# Patient Record
Sex: FEMALE | Race: ASIAN | NOT HISPANIC OR LATINO | URBAN - METROPOLITAN AREA
[De-identification: names, ages, dates, MRNs, and addresses within clinical notes are randomized per-mention and may not be internally consistent; named-entity substitution may affect disease eponyms.]

---

## 2018-08-24 VITALS — TEMPERATURE: 98 F | HEART RATE: 60 BPM | HEIGHT: 57 IN | WEIGHT: 119.05 LBS

## 2018-08-24 RX ORDER — CHLORHEXIDINE GLUCONATE 213 G/1000ML
1 SOLUTION TOPICAL ONCE
Qty: 0 | Refills: 0 | Status: DISCONTINUED | OUTPATIENT
Start: 2018-10-02 | End: 2018-10-17

## 2018-08-24 NOTE — H&P ADULT - PMH
Anemia    GERD (gastroesophageal reflux disease)    HLD (hyperlipidemia)    HTN (hypertension)    MI (myocardial infarction)

## 2018-08-24 NOTE — H&P ADULT - HISTORY OF PRESENT ILLNESS
***SKELETON***    54 y/o F with HTN, HLD, known CAD with h/o MI in 2011 - single vessel dz (D1 50%), known severe AR, who presented to his cardiologist with worsening SIDDIQUI, on walking......  Denies any chest pain, dizziness, n/v, diaphoresis, orthopnea, PND, LE edema, palpitations.  Subsequently, she underwent an ECHO on 6/30/18 which revealed EF 74%, mild to moderate AS, severe AR, mild MR.    In light of pt's risk factors, CCS class  ____ anginal equivalent symptoms and abnormal ECHO, he is recommended for R and L cardiac cath with possible intervention for suspected CAD and evaluation of valve area. 54 y/o F with HTN, HLD, known CAD with h/o MI in 2011 - single vessel dz (D1 50%), known severe AR, who presented to his cardiologist with worsening SIDDIQUI, on walking......  Denies any chest pain, dizziness, n/v, diaphoresis, orthopnea, PND, LE edema, palpitations.  Subsequently, she underwent an ECHO on 6/30/18 which revealed EF 74%, mild to moderate AS, severe AR, mild MR.    In light of pt's risk factors, CCS class  ____ anginal equivalent symptoms and abnormal ECHO, he is recommended for R and L cardiac cath with possible intervention for suspected CAD and evaluation of valve area. 54 y/o F with HTN, HLD, known CAD with h/o MI in 2011 - single vessel dz (D1 50%), known severe AR, who presented to his cardiologist with worsening SIDDIQUI, on walking less than 3 blocks.  Denies any chest pain, dizziness, n/v, diaphoresis, orthopnea, PND, LE edema, palpitations.  Subsequently, she underwent an ECHO on 6/30/18 which revealed EF 74%, mild to moderate AS, severe AR, mild MR.    In light of pt's risk factors, CCS class  2 anginal equivalent symptoms and abnormal ECHO, he is recommended for R and L cardiac cath with possible intervention for suspected CAD and evaluation of valve area. 52 y/o F with HTN, HLD, known CAD with h/o MI in 2011 - single vessel dz (D1 50%), known severe AR, who presented to his cardiologist with worsening SIDDIQUI, on walking less than 3 blocks and 2 flights of stairs with assocated nausea, dizziness and palpitations.  Denies any chest pain, vomiting, diaphoresis, orthopnea, PND, LE edema, palpitations.  Subsequently, she underwent an TRANSTHORACIC ECHO on 1/30/18 which revealed EF 74%, mild to moderate AS, severe AR, mild MR. No recent diagnostic stress studies of heart were done in the last 6 months.     Nuclear Stress test on 9/24/2013 ([facility]): calculated left ventricular fraction 66%. There was a small amount of ischemia in the lateral, inferolateral region. The possibility that this defect was due to an artifact can not be excluded. Left ventricular systolic function was normal (calculated LVEF 66%). Diagnostic sensitivity was limited by submaximal stress.    Of note, pt claims that last menstrual period was 1.5 years ago. Pt denies current menses and spotting.    In light of pt's risk factors, CCS class 2 anginal equivalent symptoms and abnormal ECHO, he is recommended for R and L cardiac cath with possible intervention for suspected CAD and evaluation of valve area. 52 y/o F with HTN, HLD, known CAD with h/o MI in 2011 - single vessel dz (D1 50%), known severe AR, who presented to his cardiologist with worsening SIDDIQUI, on walking less than 3 blocks and 2 flights of stairs with assocated nausea, dizziness and palpitations.  Denies any chest pain, vomiting, diaphoresis, orthopnea, PND, LE edema, palpitations.  Subsequently, she underwent an TRANSTHORACIC ECHO on 1/30/18 which revealed EF 74%, mild to moderate AS, severe AR, mild MR. No recent diagnostic stress studies of heart were done in the last 6 months.     Nuclear Stress test on 9/24/2013 ([facility]): calculated left ventricular fraction 66%. There was a small amount of ischemia in the lateral, inferolateral region. The possibility that this defect was due to an artifact can not be excluded. Left ventricular systolic function was normal (calculated LVEF 66%). Diagnostic sensitivity was limited by submaximal stress.    Cardiac Cath on 12/13/2013 ([facility]): Single vessel coronary artery disease present. 50% stenosis at the ostium of the 1st diagonal segment. Estimated LVEF 50%. There was moderate aortic insufficiency.    Of note, pt claims that last menstrual period was 1.5 years ago. Pt denies current menses and spotting.    In light of pt's risk factors, CCS class 2 anginal equivalent symptoms and abnormal ECHO, he is recommended for R and L cardiac cath with possible intervention for suspected CAD and evaluation of valve area. 54 y/o F with glaucoma, HTN, HLD, known CAD with h/o MI in 2011 - single vessel dz (D1 50%), known severe AR, who presented to his cardiologist with worsening SIDDIQUI, on walking less than 3 blocks and 2 flights of stairs with assocated nausea, dizziness and palpitations.  Denies any chest pain, vomiting, diaphoresis, orthopnea, PND, LE edema, palpitations.  Subsequently, she underwent an TRANSTHORACIC ECHO on 1/30/18 which revealed EF 74%, mild to moderate AS, severe AR, mild MR. No recent diagnostic stress studies of heart were done in the last 6 months.     Nuclear Stress test on 9/24/2013 ([facility]): calculated left ventricular fraction 66%. There was a small amount of ischemia in the lateral, inferolateral region. The possibility that this defect was due to an artifact can not be excluded. Left ventricular systolic function was normal (calculated LVEF 66%). Diagnostic sensitivity was limited by submaximal stress.    Cardiac Cath on 12/13/2013 ([facility]): Single vessel coronary artery disease present. 50% stenosis at the ostium of the 1st diagonal segment. Estimated LVEF 50%. There was moderate aortic insufficiency.    Of note, pt claims that last menstrual period was 1.5 years ago. Pt denies current menses and spotting.     In light of pt's risk factors, CCS class 2 anginal equivalent symptoms and abnormal ECHO, he is recommended for R and L cardiac cath with possible intervention for suspected CAD and evaluation of valve area.

## 2018-08-24 NOTE — H&P ADULT - ASSESSMENT
54 y/o F with HTN, HLD, known CAD with h/o MI in 2011 - single vessel dz (D1 50%), known severe AR, who presented to his cardiologist with worsening SIDDIQUI, on walking less than 3 blocks.  Denies any chest pain, dizziness, n/v, diaphoresis, orthopnea, PND, LE edema, palpitations.  Subsequently, she underwent an ECHO on 6/30/18 which revealed EF 74%, mild to moderate AS, severe AR, mild MR.    In light of pt's risk factors, CCS class  2 anginal equivalent symptoms and abnormal ECHO, he is recommended for R and L cardiac cath with possible intervention for suspected CAD and evaluation of valve area. 52 y/o F with HTN, HLD, known CAD with h/o MI in 2011 - single vessel dz (D1 50%), known severe AR, who presents for recommended cardiac catheterization with possible intervention secondary to pt's risk factors, known CAD. CCS class 2 anginal equivalent symptoms in the setting of an abnormal transthoracic echo.    ASA III   Mallampati III    Risks & benefits of procedure and alternative therapy have been explained to the patient including but not limited to: allergic reaction, bleeding w/possible need for blood transfusion, infection, renal and vascular compromise, limb damage, arrhythmia, stroke, vessel dissection/perforation, Myocardial infarction, emergent CABG. Informed consent obtained and in chart.     Precath/ Consented  - As d/w Dr. Bobby, no IVF hydration and ASA/Plavix load was indicated pre-procedure. Fluid KVO on call to the cath lab.  - Pre-procedure K = 3.1, pt repleated with potassium 40 meq PO x 1.  - Pt was extremely anxious and had a BP of 172/76 and then 171/75. Pt refused to take Losartan 50mg PO x 1 pre-procedure. Dr. Bobby was consulted and he gained consent for the procedure. 54 y/o F with HTN, HLD, known CAD with h/o MI in 2011 - single vessel dz (D1 50%), known severe AR, who presents for recommended cardiac catheterization with possible intervention secondary to pt's risk factors, known CAD. CCS class 2 anginal equivalent symptoms in the setting of an abnormal transthoracic echo.    ASA III   Mallampati III    Risks & benefits of procedure and alternative therapy have been explained to the patient including but not limited to: allergic reaction, bleeding w/possible need for blood transfusion, infection, renal and vascular compromise, limb damage, arrhythmia, stroke, vessel dissection/perforation, Myocardial infarction, emergent CABG. Informed consent obtained and in chart.     Precath/ Consented  - As d/w Dr. Bobby, no IVF hydration and ASA/Plavix load was indicated pre-procedure. Fluid KVO on call to the cath lab.  - Pre-procedure K = 3.1, pt repleated with potassium 40 meq PO x 1.  - Pt was extremely anxious and had a BP of 172/76 and then 171/75. Pt refused to take Losartan 50mg PO x 1 pre-procedure. Dr. Bobby was consulted and he gained pt consent for the procedure. 52 y/o F with glaucoma, HTN, HLD, known CAD with h/o MI in 2011 - single vessel dz (D1 50%), known severe AR, who presents for recommended cardiac catheterization with possible intervention secondary to pt's risk factors, known CAD. CCS class 2 anginal equivalent symptoms in the setting of an abnormal transthoracic echo.    ASA III   Mallampati III    Risks & benefits of procedure and alternative therapy have been explained to the patient including but not limited to: allergic reaction, bleeding w/possible need for blood transfusion, infection, renal and vascular compromise, limb damage, arrhythmia, stroke, vessel dissection/perforation, Myocardial infarction, emergent CABG. Informed consent obtained and in chart.     Precath/ Consented  - As d/w Dr. Bobby, no IVF hydration and ASA/Plavix load was indicated pre-procedure. Fluid KVO on call to the cath lab.  - Pre-procedure K = 3.1, pt repleated with potassium 40 meq PO x 1.  - Pt was extremely anxious and had a BP of 172/76 and then 171/75. Pt refused to take Losartan 50mg PO x 1 pre-procedure. Dr. Bobby was consulted and he gained pt consent for the procedure.

## 2018-08-24 NOTE — H&P ADULT - PSH
No significant past surgical history Cyst of left eyelid  Cyst of the left eye removed in the past month, pt still currently taking opthalmic medications

## 2018-10-02 ENCOUNTER — OUTPATIENT (OUTPATIENT)
Dept: OUTPATIENT SERVICES | Facility: HOSPITAL | Age: 53
LOS: 1 days | Discharge: HOME | End: 2018-10-02
Payer: COMMERCIAL

## 2018-10-02 DIAGNOSIS — H02.826 CYSTS OF LEFT EYE, UNSPECIFIED EYELID: Chronic | ICD-10-CM

## 2018-10-02 LAB
ANION GAP SERPL CALC-SCNC: 12 MMOL/L — SIGNIFICANT CHANGE UP (ref 5–17)
APTT BLD: 42.1 SEC — HIGH (ref 27.5–37.4)
BASOPHILS NFR BLD AUTO: 0.2 % — SIGNIFICANT CHANGE UP (ref 0–2)
BUN SERPL-MCNC: 9 MG/DL — SIGNIFICANT CHANGE UP (ref 7–23)
CALCIUM SERPL-MCNC: 10.6 MG/DL — HIGH (ref 8.4–10.5)
CHLORIDE SERPL-SCNC: 91 MMOL/L — LOW (ref 96–108)
CO2 SERPL-SCNC: 31 MMOL/L — SIGNIFICANT CHANGE UP (ref 22–31)
CREAT SERPL-MCNC: 0.76 MG/DL — SIGNIFICANT CHANGE UP (ref 0.5–1.3)
EOSINOPHIL NFR BLD AUTO: 0.3 % — SIGNIFICANT CHANGE UP (ref 0–6)
GLUCOSE SERPL-MCNC: 131 MG/DL — HIGH (ref 70–99)
HCT VFR BLD CALC: 32.3 % — LOW (ref 34.5–45)
HGB BLD-MCNC: 10.3 G/DL — LOW (ref 11.5–15.5)
INR BLD: 1.11 — SIGNIFICANT CHANGE UP (ref 0.88–1.16)
LYMPHOCYTES # BLD AUTO: 24.3 % — SIGNIFICANT CHANGE UP (ref 13–44)
MCHC RBC-ENTMCNC: 29.9 PG — SIGNIFICANT CHANGE UP (ref 27–34)
MCHC RBC-ENTMCNC: 31.9 G/DL — LOW (ref 32–36)
MCV RBC AUTO: 93.9 FL — SIGNIFICANT CHANGE UP (ref 80–100)
MONOCYTES NFR BLD AUTO: 6.3 % — SIGNIFICANT CHANGE UP (ref 2–14)
NEUTROPHILS NFR BLD AUTO: 68.9 % — SIGNIFICANT CHANGE UP (ref 43–77)
PLATELET # BLD AUTO: 251 K/UL — SIGNIFICANT CHANGE UP (ref 150–400)
POTASSIUM SERPL-MCNC: 3.1 MMOL/L — LOW (ref 3.5–5.3)
POTASSIUM SERPL-SCNC: 3.1 MMOL/L — LOW (ref 3.5–5.3)
PROTHROM AB SERPL-ACNC: 12.4 SEC — SIGNIFICANT CHANGE UP (ref 9.8–12.7)
RBC # BLD: 3.44 M/UL — LOW (ref 3.8–5.2)
RBC # FLD: 14.3 % — SIGNIFICANT CHANGE UP (ref 10.3–16.9)
SODIUM SERPL-SCNC: 134 MMOL/L — LOW (ref 135–145)
WBC # BLD: 6.4 K/UL — SIGNIFICANT CHANGE UP (ref 3.8–10.5)
WBC # FLD AUTO: 6.4 K/UL — SIGNIFICANT CHANGE UP (ref 3.8–10.5)

## 2018-10-02 PROCEDURE — 93005 ELECTROCARDIOGRAM TRACING: CPT

## 2018-10-02 PROCEDURE — 93460 R&L HRT ART/VENTRICLE ANGIO: CPT | Mod: 26

## 2018-10-02 PROCEDURE — 80048 BASIC METABOLIC PNL TOTAL CA: CPT

## 2018-10-02 PROCEDURE — 93460 R&L HRT ART/VENTRICLE ANGIO: CPT

## 2018-10-02 PROCEDURE — 93010 ELECTROCARDIOGRAM REPORT: CPT

## 2018-10-02 PROCEDURE — 93567 NJX CAR CTH SPRVLV AORTGRPHY: CPT

## 2018-10-02 PROCEDURE — C1769: CPT

## 2018-10-02 PROCEDURE — C1889: CPT

## 2018-10-02 PROCEDURE — C1887: CPT

## 2018-10-02 PROCEDURE — 85610 PROTHROMBIN TIME: CPT

## 2018-10-02 PROCEDURE — C1760: CPT

## 2018-10-02 PROCEDURE — 85730 THROMBOPLASTIN TIME PARTIAL: CPT

## 2018-10-02 PROCEDURE — 85025 COMPLETE CBC W/AUTO DIFF WBC: CPT

## 2018-10-02 PROCEDURE — C1894: CPT

## 2018-10-02 RX ORDER — ACETAMINOPHEN 500 MG
0 TABLET ORAL
Qty: 0 | Refills: 0 | COMMUNITY

## 2018-10-02 RX ORDER — POTASSIUM CHLORIDE 20 MEQ
40 PACKET (EA) ORAL ONCE
Qty: 0 | Refills: 0 | Status: COMPLETED | OUTPATIENT
Start: 2018-10-02 | End: 2018-10-02

## 2018-10-02 RX ORDER — SIMVASTATIN 20 MG/1
1 TABLET, FILM COATED ORAL
Qty: 0 | Refills: 0 | COMMUNITY

## 2018-10-02 RX ORDER — PANTOPRAZOLE SODIUM 20 MG/1
1 TABLET, DELAYED RELEASE ORAL
Qty: 0 | Refills: 0 | COMMUNITY

## 2018-10-02 RX ORDER — LOSARTAN POTASSIUM 100 MG/1
50 TABLET, FILM COATED ORAL ONCE
Qty: 0 | Refills: 0 | Status: COMPLETED | OUTPATIENT
Start: 2018-10-02 | End: 2018-10-02

## 2018-10-02 RX ORDER — TRAMADOL HYDROCHLORIDE 50 MG/1
1 TABLET ORAL
Qty: 0 | Refills: 0 | COMMUNITY

## 2018-10-02 RX ORDER — LOSARTAN/HYDROCHLOROTHIAZIDE 100MG-25MG
1 TABLET ORAL
Qty: 0 | Refills: 0 | COMMUNITY

## 2018-10-02 RX ORDER — POTASSIUM CHLORIDE 20 MEQ
2 PACKET (EA) ORAL
Qty: 0 | Refills: 0 | COMMUNITY

## 2018-10-02 RX ORDER — FERROUS SULFATE 325(65) MG
1 TABLET ORAL
Qty: 0 | Refills: 0 | COMMUNITY

## 2018-10-02 RX ORDER — DILTIAZEM HCL 120 MG
1 CAPSULE, EXT RELEASE 24 HR ORAL
Qty: 0 | Refills: 0 | COMMUNITY

## 2018-10-02 RX ORDER — LATANOPROST 0.05 MG/ML
1 SOLUTION/ DROPS OPHTHALMIC; TOPICAL
Qty: 0 | Refills: 0 | COMMUNITY

## 2018-10-02 RX ORDER — GABAPENTIN 400 MG/1
1 CAPSULE ORAL
Qty: 0 | Refills: 0 | COMMUNITY

## 2018-10-02 RX ORDER — IBUPROFEN 200 MG
1 TABLET ORAL
Qty: 0 | Refills: 0 | COMMUNITY

## 2018-10-02 RX ADMIN — Medication 40 MILLIEQUIVALENT(S): at 14:48

## 2018-10-02 NOTE — PROGRESS NOTE ADULT - SUBJECTIVE AND OBJECTIVE BOX
Interventional Cardiology PA SDA Discharge Note    Patient without complaints. Ambulated and voided without difficulties    Afebrile, VSS    Ext:    		Right groin: Arterial and venous access: no hematoma, no bleed, disease pulse intact.: 2 hours bedrest: ambulate @ 6 PM    A/P:      52 y/o F with HTN, HLD, known CAD with h/o MI in 2011 - single vessel dz (D1 50%), known severe AR, who presented to his cardiologist with worsening SIDDIQUI, on walking less than 3 blocks and 2 flights of stairs with assocated nausea, dizziness and palpitations.  Denies any chest pain, vomiting, diaphoresis, orthopnea, PND, LE edema, palpitations.  Subsequently, she underwent an TRANSTHORACIC ECHO on 1/30/18 which revealed EF 74%, mild to moderate AS, severe AR, mild MR. No recent diagnostic stress studies of heart were done in the last 6 months.  Nuclear Stress test on 9/24/2013 ([facility]): calculated left ventricular fraction 66%. There was a small amount of ischemia in the lateral, inferolateral region. The possibility that this defect was due to an artifact cannot be excluded. Left ventricular systolic function was normal (calculated LVEF 66%). Diagnostic sensitivity was limited by submaximal stress.  Pt is now s/p right and left cardiac catheterization today revealing non obstructive CAD, LVEF: 60%, LVEDP: 8 mmHg, no Aortic stenosis. Moderate to severe Aortic Regurgitation, no mitral regurgitation (Right heart catheterization: RA: 2 mmHg, RV: 24/2 mmHg, mean of 5 mmHg. PA: 27/3 mmHg, mean of 15 mmHg, PCWP: mean of 14 mmHg, PA saturation: 68% Aortic saturation: 95%. Cardiac Output: 5.22 Cardiac Index: 3.63). Dr. Gray has been consulted prior to discharge for Aortic valve disease.               1.	Stable for discharge as per attending Dr. Bobby after bed rest, pt voids, groin/wrist stable and 30 minutes of ambulation.  2.	Follow-up with PMD/Cardiologist Diamante n 1-2 weeks  3.	Discharged forms signed and copies in chart

## 2018-10-04 ENCOUNTER — APPOINTMENT (OUTPATIENT)
Dept: CARDIOTHORACIC SURGERY | Facility: CLINIC | Age: 53
End: 2018-10-04

## 2018-10-04 DIAGNOSIS — I35.1 NONRHEUMATIC AORTIC (VALVE) INSUFFICIENCY: ICD-10-CM

## 2018-10-04 DIAGNOSIS — K21.9 GASTRO-ESOPHAGEAL REFLUX DISEASE WITHOUT ESOPHAGITIS: ICD-10-CM

## 2018-10-04 DIAGNOSIS — I10 ESSENTIAL (PRIMARY) HYPERTENSION: ICD-10-CM

## 2018-10-04 DIAGNOSIS — I25.10 ATHEROSCLEROTIC HEART DISEASE OF NATIVE CORONARY ARTERY WITHOUT ANGINA PECTORIS: ICD-10-CM

## 2018-10-04 DIAGNOSIS — E78.5 HYPERLIPIDEMIA, UNSPECIFIED: ICD-10-CM

## 2018-10-04 DIAGNOSIS — I25.2 OLD MYOCARDIAL INFARCTION: ICD-10-CM

## 2018-10-04 PROBLEM — I21.9 ACUTE MYOCARDIAL INFARCTION, UNSPECIFIED: Chronic | Status: ACTIVE | Noted: 2018-08-24

## 2018-10-04 PROBLEM — D64.9 ANEMIA, UNSPECIFIED: Chronic | Status: ACTIVE | Noted: 2018-08-24

## 2018-10-04 PROBLEM — Z00.00 ENCOUNTER FOR PREVENTIVE HEALTH EXAMINATION: Status: ACTIVE | Noted: 2018-10-04

## 2018-10-10 PROBLEM — Z86.39 HISTORY OF HYPERLIPIDEMIA: Status: RESOLVED | Noted: 2018-10-10 | Resolved: 2018-10-10

## 2018-10-10 PROBLEM — Z86.79 HISTORY OF HYPERTENSION: Status: RESOLVED | Noted: 2018-10-10 | Resolved: 2018-10-10

## 2018-10-10 PROBLEM — Z78.9 NON-SMOKER: Status: ACTIVE | Noted: 2018-10-10

## 2018-10-10 PROBLEM — Z86.2 HISTORY OF ANEMIA: Status: RESOLVED | Noted: 2018-10-10 | Resolved: 2018-10-10

## 2018-10-10 PROBLEM — K21.9 CHRONIC GERD: Status: RESOLVED | Noted: 2018-10-10 | Resolved: 2018-10-10

## 2018-10-10 PROBLEM — I22.2 SUBSEQUENT NON-ST ELEVATION (NSTEMI) MYOCARDIAL INFARCTION: Status: RESOLVED | Noted: 2018-10-10 | Resolved: 2018-10-10

## 2018-10-10 PROBLEM — Z78.9 CURRENT NON-DRINKER OF ALCOHOL: Status: ACTIVE | Noted: 2018-10-10

## 2018-10-10 RX ORDER — LATANOPROST/PF 0.005 %
0.01 DROPS OPHTHALMIC (EYE) DAILY
Refills: 0 | Status: ACTIVE | COMMUNITY

## 2018-10-10 RX ORDER — CALCIUM CARBONATE/VITAMIN D3 500MG-5MCG
500-5 TABLET ORAL
Refills: 0 | Status: ACTIVE | COMMUNITY

## 2018-10-10 RX ORDER — PANTOPRAZOLE 40 MG/1
40 TABLET, DELAYED RELEASE ORAL DAILY
Qty: 30 | Refills: 3 | Status: ACTIVE | COMMUNITY

## 2018-10-17 ENCOUNTER — APPOINTMENT (OUTPATIENT)
Dept: CARDIOTHORACIC SURGERY | Facility: CLINIC | Age: 53
End: 2018-10-17

## 2018-10-17 DIAGNOSIS — Z78.9 OTHER SPECIFIED HEALTH STATUS: ICD-10-CM

## 2018-10-17 DIAGNOSIS — Z86.79 PERSONAL HISTORY OF OTHER DISEASES OF THE CIRCULATORY SYSTEM: ICD-10-CM

## 2018-10-17 DIAGNOSIS — Z86.39 PERSONAL HISTORY OF OTHER ENDOCRINE, NUTRITIONAL AND METABOLIC DISEASE: ICD-10-CM

## 2018-10-17 DIAGNOSIS — K21.9 GASTRO-ESOPHAGEAL REFLUX DISEASE W/OUT ESOPHAGITIS: ICD-10-CM

## 2018-10-17 DIAGNOSIS — I22.2 SUBSEQUENT NON-ST ELEVATION (NSTEMI) MYOCARDIAL INFARCTION: ICD-10-CM

## 2018-10-17 DIAGNOSIS — Z86.2 PERSONAL HISTORY OF DISEASES OF THE BLOOD AND BLOOD-FORMING ORGANS AND CERTAIN DISORDERS INVOLVING THE IMMUNE MECHANISM: ICD-10-CM

## 2018-10-30 ENCOUNTER — APPOINTMENT (OUTPATIENT)
Dept: CARDIOTHORACIC SURGERY | Facility: CLINIC | Age: 53
End: 2018-10-30

## 2024-06-06 VITALS
TEMPERATURE: 98 F | HEIGHT: 59 IN | OXYGEN SATURATION: 100 % | WEIGHT: 110.01 LBS | DIASTOLIC BLOOD PRESSURE: 74 MMHG | SYSTOLIC BLOOD PRESSURE: 147 MMHG | RESPIRATION RATE: 15 BRPM | HEART RATE: 59 BPM

## 2024-06-06 RX ORDER — SIMVASTATIN 20 MG/1
1 TABLET, FILM COATED ORAL
Qty: 0 | Refills: 0 | DISCHARGE

## 2024-06-06 RX ORDER — MULTIVIT-MIN/FERROUS GLUCONATE 9 MG/15 ML
1 LIQUID (ML) ORAL
Qty: 0 | Refills: 0 | DISCHARGE

## 2024-06-06 RX ORDER — PANTOPRAZOLE SODIUM 20 MG/1
1 TABLET, DELAYED RELEASE ORAL
Qty: 0 | Refills: 0 | DISCHARGE

## 2024-06-06 RX ORDER — LOSARTAN/HYDROCHLOROTHIAZIDE 100MG-25MG
1 TABLET ORAL
Qty: 0 | Refills: 0 | DISCHARGE

## 2024-06-06 RX ORDER — LATANOPROST 0.05 MG/ML
1 SOLUTION/ DROPS OPHTHALMIC; TOPICAL
Qty: 0 | Refills: 0 | DISCHARGE

## 2024-06-06 RX ORDER — ACETAMINOPHEN 500 MG
0 TABLET ORAL
Qty: 0 | Refills: 0 | DISCHARGE

## 2024-06-06 RX ORDER — ERYTHROMYCIN BASE IN ETHANOL 2 %
1 SWAB, MEDICATED TOPICAL
Qty: 0 | Refills: 0 | DISCHARGE

## 2024-06-06 NOTE — H&P ADULT - NSICDXPASTSURGICALHX_GEN_ALL_CORE_FT
PAST SURGICAL HISTORY:  Cyst of left eyelid Cyst of the left eye removed in the past month, pt still currently taking opthalmic medications

## 2024-06-06 NOTE — H&P ADULT - NS ATTEND AMEND GEN_ALL_CORE FT
58F w/ PMHx of HTN, HLD, CAD w/ NSTEMI 2011 (s/p dx LHC 2018 revealing non obstructive), HFpEF, severe AR, pAF on Eliquis (last dose 6/8/24), Anemia and GERD, who now presents to [facility] for recommended R+LHC in light of pt's risk factors and known severe AR.

## 2024-06-06 NOTE — H&P ADULT - NSICDXPASTMEDICALHX_GEN_ALL_CORE_FT
PAST MEDICAL HISTORY:  Anemia     GERD (gastroesophageal reflux disease)     HLD (hyperlipidemia)     HTN (hypertension)     MI (myocardial infarction)     Paroxysmal atrial fibrillation     Severe aortic regurgitation

## 2024-06-06 NOTE — H&P ADULT - ASSESSMENT
58F w/ PMHx of HTN, HLD, CAD w/ NSTEMI 2011 (s/p dx LHC 2018 revealing non obstructive), HFpEF, severe AR, pAF on Eliquis (last dose 6/8/24), Anemia and GERD, who now presents to [facility] for recommended R+LHC in light of pt's risk factors and known severe AR.     -ASA 3, Mallampati 3  -VSS  -LOAD: No load indicated 2/2 severe AR pending SHD  -PRECATH IVF: None indicated 2/2 RHC  -K 3.4; repleted with 40 mEq of KCl PO x1. Mg 1.9; repleted with MgO 400mg PO x1.   -Pre cath consent to be obtained by IC fellow  -Suitable for moderate sedation    Risks & benefits of procedure and alternative therapy have been explained to the patient including but not limited to: allergic reaction, bleeding w/possible need for blood transfusion, infection, renal and vascular compromise, limb damage, arrhythmia, stroke, vessel dissection/perforation, Myocardial infarction, emergent CABG. Informed consent obtained and in chart.

## 2024-06-06 NOTE — H&P ADULT - NSHPLABSRESULTS_GEN_ALL_CORE
EK.4   4.87  )-----------( 182      ( 2024 08:13 )             38.3       06-11    138  |  103  |  16  ----------------------------<  118<H>  3.4<L>   |  26  |  0.83    Ca    9.1      2024 08:13  Mg     1.9         TPro  6.4  /  Alb  3.9  /  TBili  0.4  /  DBili  x   /  AST  66<H>  /  ALT  26  /  AlkPhos  158<H>  06-11      PT/INR - ( 2024 08:13 )   PT: 11.3 sec;   INR: 0.99          PTT - ( 2024 08:13 )  PTT:39.1 sec    CARDIAC MARKERS ( 2024 08:13 )  x     / x     / 397 U/L / x     / 7.7 ng/mL        Urinalysis Basic - ( 2024 08:13 )    Color: x / Appearance: x / SG: x / pH: x  Gluc: 118 mg/dL / Ketone: x  / Bili: x / Urobili: x   Blood: x / Protein: x / Nitrite: x   Leuk Esterase: x / RBC: x / WBC x   Sq Epi: x / Non Sq Epi: x / Bacteria: x EKG: Afib 70, LVH                          12.4   4.87  )-----------( 182      ( 11 Jun 2024 08:13 )             38.3       06-11    138  |  103  |  16  ----------------------------<  118<H>  3.4<L>   |  26  |  0.83    Ca    9.1      11 Jun 2024 08:13  Mg     1.9     06-11    TPro  6.4  /  Alb  3.9  /  TBili  0.4  /  DBili  x   /  AST  66<H>  /  ALT  26  /  AlkPhos  158<H>  06-11      PT/INR - ( 11 Jun 2024 08:13 )   PT: 11.3 sec;   INR: 0.99          PTT - ( 11 Jun 2024 08:13 )  PTT:39.1 sec    CARDIAC MARKERS ( 11 Jun 2024 08:13 )  x     / x     / 397 U/L / x     / 7.7 ng/mL        Urinalysis Basic - ( 11 Jun 2024 08:13 )    Color: x / Appearance: x / SG: x / pH: x  Gluc: 118 mg/dL / Ketone: x  / Bili: x / Urobili: x   Blood: x / Protein: x / Nitrite: x   Leuk Esterase: x / RBC: x / WBC x   Sq Epi: x / Non Sq Epi: x / Bacteria: x

## 2024-06-06 NOTE — H&P ADULT - HISTORY OF PRESENT ILLNESS
Cardiologist - Dr. Bobby  Pharmacy -  [facility] -    58F w/ PMHx of HTN, HLD, CAD w/ NSTEMI 2011 (s/p dx LHC 2018 revealing non obstructive), HFpEF, severe AR, pAF on Eliquis (last dose 6/8/24), Anemia and GERD, initially presented to outpt cardiologist for routine f/u and reconsidering surgical intervention of AR. She endorses ____. Pt denies any ___ CP, palpitations, dizziness, syncope, diaphoresis, fatigue, LE edema, SIDDIQUI, orthopnea, PND, N/V/D, abd pain, cough, congestion, fever, chills or recent sick contact.     TTE 7/27/23: LVEF normal, LA severely dilated, RA mildly dilated, severe AR, mild MR/TR.    In light of pts risk factors, CCS class __ anginal symptoms and known severe AR, pt now presents to [facility] for recommended +LHC, Cardiologist - Dr. Bobby  Pharmacy - [facility] (in chart)  [facility] - Daughter    58F w/ PMHx of HTN, HLD, CAD w/ NSTEMI 2011 (s/p dx LHC 2018 revealing non obstructive), HFpEF, severe AR, pAF on Eliquis (last dose 6/8/24), Anemia and GERD, initially presented to outpt cardiologist for routine f/u and reconsidering surgical intervention of AR. She endorses some SIDDIQUI but otherwise denies any CP, palpitations, dizziness, syncope, diaphoresis, fatigue, LE edema, orthopnea, PND, N/V/D, abd pain, cough, congestion, fever, chills or recent sick contact. Of note - pt recent MD note pt was instructed to hold her statin due to elevated LFTs.     TTE 7/27/23: LVEF normal, LA severely dilated, RA mildly dilated, severe AR, mild MR/TR.    In light of pts risk factors and known severe AR, pt now presents to [facility] for recommended R+LHC,

## 2024-06-11 ENCOUNTER — RESULT REVIEW (OUTPATIENT)
Age: 59
End: 2024-06-11

## 2024-06-11 ENCOUNTER — OUTPATIENT (OUTPATIENT)
Dept: OUTPATIENT SERVICES | Facility: HOSPITAL | Age: 59
LOS: 1 days | Discharge: HOME | End: 2024-06-11
Payer: MEDICAID

## 2024-06-11 DIAGNOSIS — H02.826 CYSTS OF LEFT EYE, UNSPECIFIED EYELID: Chronic | ICD-10-CM

## 2024-06-11 PROBLEM — I48.0 PAROXYSMAL ATRIAL FIBRILLATION: Chronic | Status: ACTIVE | Noted: 2024-06-06

## 2024-06-11 PROBLEM — I35.1 NONRHEUMATIC AORTIC (VALVE) INSUFFICIENCY: Chronic | Status: ACTIVE | Noted: 2024-06-06

## 2024-06-11 LAB
A1C WITH ESTIMATED AVERAGE GLUCOSE RESULT: 6.1 % — HIGH (ref 4–5.6)
ALBUMIN SERPL ELPH-MCNC: 3.9 G/DL — SIGNIFICANT CHANGE UP (ref 3.3–5)
ALP SERPL-CCNC: 158 U/L — HIGH (ref 40–120)
ALT FLD-CCNC: 26 U/L — SIGNIFICANT CHANGE UP (ref 10–45)
ANION GAP SERPL CALC-SCNC: 9 MMOL/L — SIGNIFICANT CHANGE UP (ref 5–17)
APTT BLD: 39.1 SEC — HIGH (ref 24.5–35.6)
AST SERPL-CCNC: 66 U/L — HIGH (ref 10–40)
BASOPHILS # BLD AUTO: 0.02 K/UL — SIGNIFICANT CHANGE UP (ref 0–0.2)
BASOPHILS NFR BLD AUTO: 0.4 % — SIGNIFICANT CHANGE UP (ref 0–2)
BILIRUB SERPL-MCNC: 0.4 MG/DL — SIGNIFICANT CHANGE UP (ref 0.2–1.2)
BUN SERPL-MCNC: 16 MG/DL — SIGNIFICANT CHANGE UP (ref 7–23)
CALCIUM SERPL-MCNC: 9.1 MG/DL — SIGNIFICANT CHANGE UP (ref 8.4–10.5)
CHLORIDE SERPL-SCNC: 103 MMOL/L — SIGNIFICANT CHANGE UP (ref 96–108)
CHOLEST SERPL-MCNC: 139 MG/DL — SIGNIFICANT CHANGE UP
CK MB CFR SERPL CALC: 7.7 NG/ML — HIGH (ref 0–6.7)
CK SERPL-CCNC: 397 U/L — HIGH (ref 25–170)
CO2 SERPL-SCNC: 26 MMOL/L — SIGNIFICANT CHANGE UP (ref 22–31)
COHGB MFR BLDA: 1.8 % — SIGNIFICANT CHANGE UP
COHGB MFR BLDV: 2.2 % — SIGNIFICANT CHANGE UP
CREAT SERPL-MCNC: 0.83 MG/DL — SIGNIFICANT CHANGE UP (ref 0.5–1.3)
EGFR: 81 ML/MIN/1.73M2 — SIGNIFICANT CHANGE UP
EOSINOPHIL # BLD AUTO: 0.07 K/UL — SIGNIFICANT CHANGE UP (ref 0–0.5)
EOSINOPHIL NFR BLD AUTO: 1.4 % — SIGNIFICANT CHANGE UP (ref 0–6)
ESTIMATED AVERAGE GLUCOSE: 128 MG/DL — HIGH (ref 68–114)
GLUCOSE BLDC GLUCOMTR-MCNC: 94 MG/DL — SIGNIFICANT CHANGE UP (ref 70–99)
GLUCOSE SERPL-MCNC: 118 MG/DL — HIGH (ref 70–99)
HCT VFR BLD CALC: 38.3 % — SIGNIFICANT CHANGE UP (ref 34.5–45)
HDLC SERPL-MCNC: 54 MG/DL — SIGNIFICANT CHANGE UP
HGB BLD CALC-MCNC: 12.3 G/DL — SIGNIFICANT CHANGE UP (ref 11.7–16.1)
HGB BLD-MCNC: 12.4 G/DL — SIGNIFICANT CHANGE UP (ref 11.5–15.5)
HGB BLDA-MCNC: 12.1 G/DL — SIGNIFICANT CHANGE UP (ref 11.7–16.1)
IMM GRANULOCYTES NFR BLD AUTO: 0.2 % — SIGNIFICANT CHANGE UP (ref 0–0.9)
INR BLD: 0.99 — SIGNIFICANT CHANGE UP (ref 0.85–1.18)
LIPID PNL WITH DIRECT LDL SERPL: 70 MG/DL — SIGNIFICANT CHANGE UP
LYMPHOCYTES # BLD AUTO: 1.23 K/UL — SIGNIFICANT CHANGE UP (ref 1–3.3)
LYMPHOCYTES # BLD AUTO: 25.3 % — SIGNIFICANT CHANGE UP (ref 13–44)
MAGNESIUM SERPL-MCNC: 1.9 MG/DL — SIGNIFICANT CHANGE UP (ref 1.6–2.6)
MCHC RBC-ENTMCNC: 31.6 PG — SIGNIFICANT CHANGE UP (ref 27–34)
MCHC RBC-ENTMCNC: 32.4 GM/DL — SIGNIFICANT CHANGE UP (ref 32–36)
MCV RBC AUTO: 97.7 FL — SIGNIFICANT CHANGE UP (ref 80–100)
METHGB MFR BLDA: 0.8 % — SIGNIFICANT CHANGE UP
METHGB MFR BLDV: 0.9 % — SIGNIFICANT CHANGE UP
MONOCYTES # BLD AUTO: 0.37 K/UL — SIGNIFICANT CHANGE UP (ref 0–0.9)
MONOCYTES NFR BLD AUTO: 7.6 % — SIGNIFICANT CHANGE UP (ref 2–14)
NEUTROPHILS # BLD AUTO: 3.17 K/UL — SIGNIFICANT CHANGE UP (ref 1.8–7.4)
NEUTROPHILS NFR BLD AUTO: 65.1 % — SIGNIFICANT CHANGE UP (ref 43–77)
NON HDL CHOLESTEROL: 85 MG/DL — SIGNIFICANT CHANGE UP
NRBC # BLD: 0 /100 WBCS — SIGNIFICANT CHANGE UP (ref 0–0)
OXYHGB MFR BLDA: 95.6 % — HIGH (ref 90–95)
PLATELET # BLD AUTO: 182 K/UL — SIGNIFICANT CHANGE UP (ref 150–400)
POTASSIUM SERPL-MCNC: 3.4 MMOL/L — LOW (ref 3.5–5.3)
POTASSIUM SERPL-SCNC: 3.4 MMOL/L — LOW (ref 3.5–5.3)
PROT SERPL-MCNC: 6.4 G/DL — SIGNIFICANT CHANGE UP (ref 6–8.3)
PROTHROM AB SERPL-ACNC: 11.3 SEC — SIGNIFICANT CHANGE UP (ref 9.5–13)
RBC # BLD: 3.92 M/UL — SIGNIFICANT CHANGE UP (ref 3.8–5.2)
RBC # FLD: 14.1 % — SIGNIFICANT CHANGE UP (ref 10.3–14.5)
SAO2 % BLDA: 98.2 % — HIGH (ref 94–98)
SAO2 % BLDV: 72 % — SIGNIFICANT CHANGE UP (ref 67–88)
SODIUM SERPL-SCNC: 138 MMOL/L — SIGNIFICANT CHANGE UP (ref 135–145)
TRIGL SERPL-MCNC: 76 MG/DL — SIGNIFICANT CHANGE UP
WBC # BLD: 4.87 K/UL — SIGNIFICANT CHANGE UP (ref 3.8–10.5)
WBC # FLD AUTO: 4.87 K/UL — SIGNIFICANT CHANGE UP (ref 3.8–10.5)

## 2024-06-11 PROCEDURE — C1887: CPT

## 2024-06-11 PROCEDURE — 82962 GLUCOSE BLOOD TEST: CPT

## 2024-06-11 PROCEDURE — 85025 COMPLETE CBC W/AUTO DIFF WBC: CPT

## 2024-06-11 PROCEDURE — C1894: CPT

## 2024-06-11 PROCEDURE — 82553 CREATINE MB FRACTION: CPT

## 2024-06-11 PROCEDURE — 80053 COMPREHEN METABOLIC PANEL: CPT

## 2024-06-11 PROCEDURE — 93460 R&L HRT ART/VENTRICLE ANGIO: CPT | Mod: 26

## 2024-06-11 PROCEDURE — 99152 MOD SED SAME PHYS/QHP 5/>YRS: CPT

## 2024-06-11 PROCEDURE — 36415 COLL VENOUS BLD VENIPUNCTURE: CPT

## 2024-06-11 PROCEDURE — 76377 3D RENDER W/INTRP POSTPROCES: CPT | Mod: 26

## 2024-06-11 PROCEDURE — 93306 TTE W/DOPPLER COMPLETE: CPT | Mod: 26

## 2024-06-11 PROCEDURE — 93005 ELECTROCARDIOGRAM TRACING: CPT

## 2024-06-11 PROCEDURE — 82803 BLOOD GASES ANY COMBINATION: CPT

## 2024-06-11 PROCEDURE — 82550 ASSAY OF CK (CPK): CPT

## 2024-06-11 PROCEDURE — C1769: CPT

## 2024-06-11 PROCEDURE — 85610 PROTHROMBIN TIME: CPT

## 2024-06-11 PROCEDURE — 93460 R&L HRT ART/VENTRICLE ANGIO: CPT

## 2024-06-11 PROCEDURE — 83735 ASSAY OF MAGNESIUM: CPT

## 2024-06-11 PROCEDURE — 93010 ELECTROCARDIOGRAM REPORT: CPT

## 2024-06-11 PROCEDURE — 83036 HEMOGLOBIN GLYCOSYLATED A1C: CPT

## 2024-06-11 PROCEDURE — 80061 LIPID PANEL: CPT

## 2024-06-11 PROCEDURE — 85730 THROMBOPLASTIN TIME PARTIAL: CPT

## 2024-06-11 PROCEDURE — 93306 TTE W/DOPPLER COMPLETE: CPT

## 2024-06-11 RX ORDER — POTASSIUM CHLORIDE 20 MEQ
1 PACKET (EA) ORAL
Refills: 0 | DISCHARGE

## 2024-06-11 RX ORDER — CHLORHEXIDINE GLUCONATE 213 G/1000ML
1 SOLUTION TOPICAL ONCE
Refills: 0 | Status: ACTIVE | OUTPATIENT
Start: 2024-06-11

## 2024-06-11 RX ORDER — APIXABAN 2.5 MG/1
1 TABLET, FILM COATED ORAL
Refills: 0 | DISCHARGE

## 2024-06-11 RX ORDER — ATORVASTATIN CALCIUM 80 MG/1
1 TABLET, FILM COATED ORAL
Refills: 0 | DISCHARGE

## 2024-06-11 RX ORDER — METOPROLOL TARTRATE 50 MG
1 TABLET ORAL
Refills: 0 | DISCHARGE

## 2024-06-11 RX ORDER — POTASSIUM CHLORIDE 20 MEQ
20 PACKET (EA) ORAL ONCE
Refills: 0 | Status: COMPLETED | OUTPATIENT
Start: 2024-06-11 | End: 2024-06-11

## 2024-06-11 RX ORDER — PANTOPRAZOLE SODIUM 20 MG/1
1 TABLET, DELAYED RELEASE ORAL
Refills: 0 | DISCHARGE

## 2024-06-11 RX ORDER — LATANOPROST 0.05 MG/ML
1 SOLUTION/ DROPS OPHTHALMIC; TOPICAL
Refills: 0 | DISCHARGE

## 2024-06-11 RX ORDER — POTASSIUM CHLORIDE 20 MEQ
40 PACKET (EA) ORAL ONCE
Refills: 0 | Status: COMPLETED | OUTPATIENT
Start: 2024-06-11 | End: 2024-06-11

## 2024-06-11 RX ORDER — FUROSEMIDE 40 MG
1 TABLET ORAL
Refills: 0 | DISCHARGE

## 2024-06-11 RX ORDER — MAGNESIUM OXIDE 400 MG ORAL TABLET 241.3 MG
400 TABLET ORAL ONCE
Refills: 0 | Status: COMPLETED | OUTPATIENT
Start: 2024-06-11 | End: 2024-06-11

## 2024-06-11 RX ORDER — LOSARTAN POTASSIUM 100 MG/1
1 TABLET, FILM COATED ORAL
Refills: 0 | DISCHARGE

## 2024-06-11 RX ADMIN — Medication 40 MILLIEQUIVALENT(S): at 10:03

## 2024-06-11 RX ADMIN — MAGNESIUM OXIDE 400 MG ORAL TABLET 400 MILLIGRAM(S): 241.3 TABLET ORAL at 10:03

## 2024-06-11 RX ADMIN — Medication 20 MILLIEQUIVALENT(S): at 12:19

## 2024-06-11 NOTE — PROGRESS NOTE ADULT - SUBJECTIVE AND OBJECTIVE BOX
Interventional Cardiology PA SDA Discharge Note    Patient without complaints. Ambulated and voided without difficulties    Afebrile, VSS    Ext:    		  		Right              Radial :    No hematoma,    No bleeding, dressing; C/D/I      Pulses:    intact RAD to baseline     A/P:    58F w/ PMHx of HTN, HLD, CAD w/ NSTEMI 2011 (s/p dx LHC 2018 revealing non obstructive), HFpEF, severe AR, pAF on Eliquis (last dose 6/8/24), Anemia and GERD, who now presents to [facility] for recommended R+LHC in light of pt's risk factors and known severe AR.     Pt underwent LHC and RHC.  LHC: Codominant, LM short, diffuse luminal irregularities   access: RRA 6 Fr  LVEDP: 17  LV gram: 55%    RHC:   access: RBV 5 Fr, exchanged for 14 G  RA: 8, RV 27/8, PA 27/20, PAWP 14, Pa sat: 71.5%, Ao sat: 98.2 %                1.	Stable for discharge as per attending Dr. Bobby after bed rest, pt voids, groin/wrist stable and 30 minutes of ambulation.  2.	Follow-up with PMD/Cardiologist Diamante in 1-2 weeks  3.	Discharged forms signed and copies in chart

## 2024-06-12 DIAGNOSIS — I25.110 ATHEROSCLEROTIC HEART DISEASE OF NATIVE CORONARY ARTERY WITH UNSTABLE ANGINA PECTORIS: ICD-10-CM

## 2024-06-12 DIAGNOSIS — I77.819 AORTIC ECTASIA, UNSPECIFIED SITE: ICD-10-CM

## 2024-06-12 DIAGNOSIS — Z01.818 ENCOUNTER FOR OTHER PREPROCEDURAL EXAMINATION: ICD-10-CM

## 2024-06-12 DIAGNOSIS — I35.1 NONRHEUMATIC AORTIC (VALVE) INSUFFICIENCY: ICD-10-CM

## 2024-06-18 DIAGNOSIS — I71.20 THORACIC AORTIC ANEURYSM, WITHOUT RUPTURE, UNSPECIFIED: ICD-10-CM

## 2024-06-19 ENCOUNTER — APPOINTMENT (OUTPATIENT)
Dept: CARDIOTHORACIC SURGERY | Facility: CLINIC | Age: 59
End: 2024-06-19
Payer: MEDICAID

## 2024-06-19 ENCOUNTER — NON-APPOINTMENT (OUTPATIENT)
Age: 59
End: 2024-06-19

## 2024-06-19 VITALS
HEART RATE: 60 BPM | TEMPERATURE: 97.4 F | WEIGHT: 103 LBS | SYSTOLIC BLOOD PRESSURE: 149 MMHG | DIASTOLIC BLOOD PRESSURE: 65 MMHG | OXYGEN SATURATION: 97 % | HEIGHT: 59 IN | BODY MASS INDEX: 20.76 KG/M2

## 2024-06-19 DIAGNOSIS — I35.1 NONRHEUMATIC AORTIC (VALVE) INSUFFICIENCY: ICD-10-CM

## 2024-06-19 PROCEDURE — 99204 OFFICE O/P NEW MOD 45 MIN: CPT | Mod: 25

## 2024-06-19 PROCEDURE — T1013: CPT

## 2024-06-20 PROBLEM — I35.1 SEVERE AORTIC REGURGITATION: Status: ACTIVE | Noted: 2018-10-10

## 2024-06-20 RX ORDER — APIXABAN 2.5 MG/1
2.5 TABLET, FILM COATED ORAL
Qty: 180 | Refills: 0 | Status: ACTIVE | COMMUNITY

## 2024-06-20 RX ORDER — ERYTHROMYCIN 20 MG/ML
SOLUTION TOPICAL
Refills: 0 | Status: DISCONTINUED | COMMUNITY
End: 2024-06-20

## 2024-06-20 RX ORDER — METOPROLOL TARTRATE 50 MG/1
50 TABLET, FILM COATED ORAL
Qty: 60 | Refills: 0 | Status: ACTIVE | COMMUNITY

## 2024-06-20 RX ORDER — DEXTRAN 70/HYPROMELLOSE 0.1%-0.3%
0.1-0.3 DROPS OPHTHALMIC (EYE)
Refills: 0 | Status: DISCONTINUED | COMMUNITY
End: 2024-06-20

## 2024-06-20 RX ORDER — LOSARTAN POTASSIUM 100 MG/1
100 TABLET, FILM COATED ORAL DAILY
Refills: 0 | Status: ACTIVE | COMMUNITY

## 2024-06-20 RX ORDER — FUROSEMIDE 40 MG/1
40 TABLET ORAL DAILY
Qty: 30 | Refills: 0 | Status: ACTIVE | COMMUNITY

## 2024-06-20 RX ORDER — POTASSIUM CHLORIDE 750 MG/1
10 CAPSULE, EXTENDED RELEASE ORAL DAILY
Qty: 7 | Refills: 0 | Status: ACTIVE | COMMUNITY

## 2024-06-20 RX ORDER — SIMVASTATIN 20 MG/1
20 TABLET, FILM COATED ORAL
Refills: 0 | Status: DISCONTINUED | COMMUNITY
End: 2024-06-20

## 2024-06-20 RX ORDER — LOSARTAN POTASSIUM AND HYDROCHLOROTHIAZIDE 12.5; 5 MG/1; MG/1
50-12.5 TABLET ORAL DAILY
Refills: 0 | Status: DISCONTINUED | COMMUNITY
End: 2024-06-20

## 2024-06-20 RX ORDER — ACETAMINOPHEN 500 MG/1
500 TABLET ORAL
Refills: 0 | Status: DISCONTINUED | COMMUNITY
End: 2024-06-20

## 2024-06-20 RX ORDER — MULTIVIT,CALC,MINS/IRON/FOLIC 9MG-400MCG
TABLET ORAL DAILY
Refills: 0 | Status: DISCONTINUED | COMMUNITY
End: 2024-06-20

## 2024-06-20 RX ORDER — FLAXSEED OIL 1000 MG
1000 CAPSULE ORAL
Refills: 0 | Status: DISCONTINUED | COMMUNITY
End: 2024-06-20

## 2024-07-10 RX ORDER — AMOXICILLIN 500 MG/1
500 CAPSULE ORAL
Qty: 4 | Refills: 0 | Status: ACTIVE | COMMUNITY
Start: 2024-07-10 | End: 1900-01-01

## 2024-07-30 ENCOUNTER — APPOINTMENT (OUTPATIENT)
Dept: SURGERY | Facility: CLINIC | Age: 59
End: 2024-07-30
Payer: MEDICAID

## 2024-07-30 VITALS
WEIGHT: 103 LBS | HEIGHT: 59 IN | SYSTOLIC BLOOD PRESSURE: 165 MMHG | BODY MASS INDEX: 20.76 KG/M2 | HEART RATE: 82 BPM | DIASTOLIC BLOOD PRESSURE: 72 MMHG | OXYGEN SATURATION: 99 % | TEMPERATURE: 97.5 F

## 2024-07-30 PROCEDURE — 99204 OFFICE O/P NEW MOD 45 MIN: CPT

## 2024-08-02 NOTE — HISTORY OF PRESENT ILLNESS
[de-identified] : Ms. Bay is a very pleasant 58-year-old woman who presents with her daughter for evaluation of her gallbladder.  She was seen roughly 3 months ago at an outside hospital for abdominal pain.  She was told that there was a problem with her gallbladder and to follow-up with the surgeon.  She did see a surgeon at that outside hospital who recommended consideration for cholecystectomy after her severe valve insufficiency has been addressed since there is a question as to whether more remotely on prior workup.  Biliary sludge, though the records I have reviewed..  She is planning to have her cardiac surgery at [facility] is referred for consideration of preoperative cholecystectomy.  She reports that she has right upper quadrant pain consistently throughout the day.  It is not brought on by meals.  It does not seem to ever get worse or better.  She does not have any associated fevers chills nausea or vomiting or change in bowel habits.  It does not radiate anywhere.

## 2024-08-02 NOTE — PHYSICAL EXAM
[Respiratory Effort] : normal respiratory effort [Normal Rate and Rhythm] : normal rate and rhythm [No Rash or Lesion] : No rash or lesion [Alert] : alert [Oriented to Person] : oriented to person [Oriented to Place] : oriented to place [Oriented to Time] : oriented to time [Calm] : calm [de-identified] : well, NAD [de-identified] : NCAT [de-identified] : soft, non tender, non distended

## 2024-08-02 NOTE — PHYSICAL EXAM
[Respiratory Effort] : normal respiratory effort [Normal Rate and Rhythm] : normal rate and rhythm [No Rash or Lesion] : No rash or lesion [Alert] : alert [Oriented to Person] : oriented to person [Oriented to Place] : oriented to place [Oriented to Time] : oriented to time [Calm] : calm [de-identified] : well, NAD [de-identified] : NCAT [de-identified] : soft, non tender, non distended

## 2024-08-02 NOTE — PLAN
[FreeTextEntry1] : I reviewed with the patient and her daughter my concerns that her valve insufficiency is too severe to warrant a nonemergent cholecystectomy, especially in the setting of an atypical presentation with other potential sources of GBW thickening.  I would like her to return to see me in the office when she has recovered from her valve replacement.  I will try to obtain any older records with biliary imaging as well.

## 2024-08-02 NOTE — HISTORY OF PRESENT ILLNESS
[de-identified] : Ms. Bay is a very pleasant 58-year-old woman who presents with her daughter for evaluation of her gallbladder.  She was seen roughly 3 months ago at an outside hospital for abdominal pain.  She was told that there was a problem with her gallbladder and to follow-up with the surgeon.  She did see a surgeon at that outside hospital who recommended consideration for cholecystectomy after her severe valve insufficiency has been addressed since there is a question as to whether more remotely on prior workup.  Biliary sludge, though the records I have reviewed..  She is planning to have her cardiac surgery at [facility] is referred for consideration of preoperative cholecystectomy.  She reports that she has right upper quadrant pain consistently throughout the day.  It is not brought on by meals.  It does not seem to ever get worse or better.  She does not have any associated fevers chills nausea or vomiting or change in bowel habits.  It does not radiate anywhere.

## 2024-08-02 NOTE — DATA REVIEWED
[FreeTextEntry1] : reviewed report of OSH u/s and labs that were provided. AST/ALT slightly elevated, nl tbili. GBW thickened with no stones or sludge + perichol fluid. + NAFLD

## 2024-08-02 NOTE — REVIEW OF SYSTEMS
[Shortness Of Breath] : shortness of breath [SOB on Exertion] : shortness of breath during exertion [As Noted in HPI] : as noted in HPI [Negative] : Psychiatric [FreeTextEntry3] : no icterus [FreeTextEntry8] : no dark urine [de-identified] : on chronic anticoag

## 2024-08-02 NOTE — ASSESSMENT
[FreeTextEntry1] : The patient is a very pleasant 58-year-old woman with a history of coronary artery disease hypertension hyperlipidemia elevated hemoglobin A1c fatty liver and recent thickening of her gallbladder wall with no stones or sludge.  At the time she did not have any leukocytosis or signs of an acute inflammatory process.  That was back in May and she had left from the emergency room at an outside hospital with no antibiotics or pain medication.  There was no evidence of any infection at that time.  She does not have any typical symptoms of biliary colic and the pain is more or less constant in the right upper quadrant with no variation based on food.  Overall while it is possible that she previously had had some sludge which was suggested in 1 record provided but I cannot find objective evidence of this, in general the majority of patients will have several bile and less they have had previous instrumentation which she has not had.  In addition it seems unlikely that her current symptoms are from biliary colic and may be more related to her fatty liver or some congestion and pericholecystic fluid relating to her heart condition.  I will work on obtaining more records to see whether it is true that she ever had sludge or not. In addition currently she would be at extremely high risk for any laparoscopic surgery in terms of her heart valve condition.  The risks of pneumoperitoneum and general anesthesia for her likely outweigh the benefit of proceeding prior to having her valve repaired.  We can reconsider whether she has indications for cholecystectomy or not after she has had her valve repaired.

## 2024-08-02 NOTE — REVIEW OF SYSTEMS
[Shortness Of Breath] : shortness of breath [SOB on Exertion] : shortness of breath during exertion [As Noted in HPI] : as noted in HPI [Negative] : Psychiatric [FreeTextEntry3] : no icterus [FreeTextEntry8] : no dark urine [de-identified] : on chronic anticoag

## 2024-08-02 NOTE — PHYSICAL EXAM
[Respiratory Effort] : normal respiratory effort [Normal Rate and Rhythm] : normal rate and rhythm [No Rash or Lesion] : No rash or lesion [Alert] : alert [Oriented to Person] : oriented to person [Oriented to Place] : oriented to place [Oriented to Time] : oriented to time [Calm] : calm [de-identified] : well, NAD [de-identified] : NCAT [de-identified] : soft, non tender, non distended

## 2024-08-02 NOTE — HISTORY OF PRESENT ILLNESS
[de-identified] : Ms. Bay is a very pleasant 58-year-old woman who presents with her daughter for evaluation of her gallbladder.  She was seen roughly 3 months ago at an outside hospital for abdominal pain.  She was told that there was a problem with her gallbladder and to follow-up with the surgeon.  She did see a surgeon at that outside hospital who recommended consideration for cholecystectomy after her severe valve insufficiency has been addressed since there is a question as to whether more remotely on prior workup.  Biliary sludge, though the records I have reviewed..  She is planning to have her cardiac surgery at [facility] is referred for consideration of preoperative cholecystectomy.  She reports that she has right upper quadrant pain consistently throughout the day.  It is not brought on by meals.  It does not seem to ever get worse or better.  She does not have any associated fevers chills nausea or vomiting or change in bowel habits.  It does not radiate anywhere.

## 2024-08-02 NOTE — REVIEW OF SYSTEMS
[Shortness Of Breath] : shortness of breath [SOB on Exertion] : shortness of breath during exertion [As Noted in HPI] : as noted in HPI [Negative] : Psychiatric [FreeTextEntry3] : no icterus [FreeTextEntry8] : no dark urine [de-identified] : on chronic anticoag

## 2024-08-08 PROBLEM — E11.9 DIABETES MELLITUS: Status: ACTIVE | Noted: 2024-08-08

## 2024-08-16 DIAGNOSIS — Z95.828 PRESENCE OF OTHER VASCULAR IMPLANTS AND GRAFTS: ICD-10-CM

## 2024-09-03 ENCOUNTER — APPOINTMENT (OUTPATIENT)
Dept: CARDIOTHORACIC SURGERY | Facility: HOSPITAL | Age: 59
End: 2024-09-03

## 2024-12-04 DIAGNOSIS — I48.91 UNSPECIFIED ATRIAL FIBRILLATION: ICD-10-CM

## 2024-12-11 ENCOUNTER — NON-APPOINTMENT (OUTPATIENT)
Age: 59
End: 2024-12-11

## 2024-12-20 DIAGNOSIS — I50.9 HEART FAILURE, UNSPECIFIED: ICD-10-CM

## 2024-12-20 RX ORDER — SPIRONOLACTONE 50 MG/1
50 TABLET ORAL DAILY
Refills: 0 | Status: ACTIVE | COMMUNITY
Start: 2024-12-20

## 2024-12-20 RX ORDER — MULTIVITAMIN WITH FOLIC ACID 400 MCG
TABLET ORAL DAILY
Refills: 0 | Status: ACTIVE | COMMUNITY
Start: 2024-12-20

## 2024-12-20 RX ORDER — SACUBITRIL AND VALSARTAN 24; 26 MG/1; MG/1
24-26 TABLET, FILM COATED ORAL TWICE DAILY
Refills: 0 | Status: ACTIVE | COMMUNITY
Start: 2024-12-20

## 2024-12-20 RX ORDER — CARVEDILOL 3.12 MG/1
3.12 TABLET, FILM COATED ORAL TWICE DAILY
Refills: 0 | Status: ACTIVE | COMMUNITY
Start: 2024-12-20

## 2024-12-20 RX ORDER — ATORVASTATIN CALCIUM 20 MG/1
20 TABLET, FILM COATED ORAL
Qty: 90 | Refills: 1 | Status: ACTIVE | COMMUNITY
Start: 2024-12-20

## 2024-12-22 ENCOUNTER — INPATIENT (INPATIENT)
Facility: HOSPITAL | Age: 59
LOS: 17 days | Discharge: HOME | End: 2025-01-09
Attending: THORACIC SURGERY (CARDIOTHORACIC VASCULAR SURGERY) | Admitting: THORACIC SURGERY (CARDIOTHORACIC VASCULAR SURGERY)
Payer: MEDICAID

## 2024-12-22 VITALS
HEART RATE: 72 BPM | RESPIRATION RATE: 18 BRPM | WEIGHT: 99.21 LBS | DIASTOLIC BLOOD PRESSURE: 66 MMHG | OXYGEN SATURATION: 96 % | SYSTOLIC BLOOD PRESSURE: 144 MMHG | HEIGHT: 59 IN

## 2024-12-22 DIAGNOSIS — H02.826 CYSTS OF LEFT EYE, UNSPECIFIED EYELID: Chronic | ICD-10-CM

## 2024-12-22 LAB
A1C WITH ESTIMATED AVERAGE GLUCOSE RESULT: 6.2 % — HIGH (ref 4–5.6)
ALBUMIN SERPL ELPH-MCNC: 3.9 G/DL — SIGNIFICANT CHANGE UP (ref 3.3–5)
ALBUMIN SERPL ELPH-MCNC: 4.5 G/DL — SIGNIFICANT CHANGE UP (ref 3.3–5)
ALP SERPL-CCNC: 104 U/L — SIGNIFICANT CHANGE UP (ref 40–120)
ALP SERPL-CCNC: 96 U/L — SIGNIFICANT CHANGE UP (ref 40–120)
ALT FLD-CCNC: 14 U/L — SIGNIFICANT CHANGE UP (ref 10–45)
ALT FLD-CCNC: 15 U/L — SIGNIFICANT CHANGE UP (ref 10–45)
ANION GAP SERPL CALC-SCNC: 7 MMOL/L — SIGNIFICANT CHANGE UP (ref 5–17)
ANION GAP SERPL CALC-SCNC: 9 MMOL/L — SIGNIFICANT CHANGE UP (ref 5–17)
APPEARANCE UR: CLEAR — SIGNIFICANT CHANGE UP
APTT BLD: 36.6 SEC — HIGH (ref 24.5–35.6)
APTT BLD: 81.1 SEC — HIGH (ref 24.5–35.6)
AST SERPL-CCNC: 40 U/L — SIGNIFICANT CHANGE UP (ref 10–40)
AST SERPL-CCNC: 49 U/L — HIGH (ref 10–40)
BASOPHILS # BLD AUTO: 0.02 K/UL — SIGNIFICANT CHANGE UP (ref 0–0.2)
BASOPHILS NFR BLD AUTO: 0.4 % — SIGNIFICANT CHANGE UP (ref 0–2)
BILIRUB SERPL-MCNC: 0.3 MG/DL — SIGNIFICANT CHANGE UP (ref 0.2–1.2)
BILIRUB SERPL-MCNC: 0.4 MG/DL — SIGNIFICANT CHANGE UP (ref 0.2–1.2)
BILIRUB UR-MCNC: NEGATIVE — SIGNIFICANT CHANGE UP
BLD GP AB SCN SERPL QL: NEGATIVE — SIGNIFICANT CHANGE UP
BLD GP AB SCN SERPL QL: NEGATIVE — SIGNIFICANT CHANGE UP
BUN SERPL-MCNC: 20 MG/DL — SIGNIFICANT CHANGE UP (ref 7–23)
BUN SERPL-MCNC: 24 MG/DL — HIGH (ref 7–23)
CALCIUM SERPL-MCNC: 8.9 MG/DL — SIGNIFICANT CHANGE UP (ref 8.4–10.5)
CALCIUM SERPL-MCNC: 9.4 MG/DL — SIGNIFICANT CHANGE UP (ref 8.4–10.5)
CHLORIDE SERPL-SCNC: 93 MMOL/L — LOW (ref 96–108)
CHLORIDE SERPL-SCNC: 96 MMOL/L — SIGNIFICANT CHANGE UP (ref 96–108)
CHOLEST SERPL-MCNC: 167 MG/DL — SIGNIFICANT CHANGE UP
CO2 SERPL-SCNC: 24 MMOL/L — SIGNIFICANT CHANGE UP (ref 22–31)
CO2 SERPL-SCNC: 27 MMOL/L — SIGNIFICANT CHANGE UP (ref 22–31)
COLOR SPEC: YELLOW — SIGNIFICANT CHANGE UP
CREAT ?TM UR-MCNC: 8 MG/DL — SIGNIFICANT CHANGE UP
CREAT SERPL-MCNC: 0.79 MG/DL — SIGNIFICANT CHANGE UP (ref 0.5–1.3)
CREAT SERPL-MCNC: 0.89 MG/DL — SIGNIFICANT CHANGE UP (ref 0.5–1.3)
DIFF PNL FLD: NEGATIVE — SIGNIFICANT CHANGE UP
EGFR: 74 ML/MIN/1.73M2 — SIGNIFICANT CHANGE UP
EGFR: 86 ML/MIN/1.73M2 — SIGNIFICANT CHANGE UP
EOSINOPHIL # BLD AUTO: 0.02 K/UL — SIGNIFICANT CHANGE UP (ref 0–0.5)
EOSINOPHIL NFR BLD AUTO: 0.4 % — SIGNIFICANT CHANGE UP (ref 0–6)
ESTIMATED AVERAGE GLUCOSE: 131 MG/DL — HIGH (ref 68–114)
GLUCOSE SERPL-MCNC: 129 MG/DL — HIGH (ref 70–99)
GLUCOSE SERPL-MCNC: 91 MG/DL — SIGNIFICANT CHANGE UP (ref 70–99)
GLUCOSE UR QL: NEGATIVE MG/DL — SIGNIFICANT CHANGE UP
HCT VFR BLD CALC: 37.8 % — SIGNIFICANT CHANGE UP (ref 34.5–45)
HDLC SERPL-MCNC: 62 MG/DL — SIGNIFICANT CHANGE UP
HGB BLD-MCNC: 12.8 G/DL — SIGNIFICANT CHANGE UP (ref 11.5–15.5)
IMM GRANULOCYTES NFR BLD AUTO: 0.2 % — SIGNIFICANT CHANGE UP (ref 0–0.9)
INR BLD: 1.01 — SIGNIFICANT CHANGE UP (ref 0.85–1.16)
KETONES UR-MCNC: NEGATIVE MG/DL — SIGNIFICANT CHANGE UP
LEUKOCYTE ESTERASE UR-ACNC: NEGATIVE — SIGNIFICANT CHANGE UP
LIPID PNL WITH DIRECT LDL SERPL: 92 MG/DL — SIGNIFICANT CHANGE UP
LYMPHOCYTES # BLD AUTO: 1.39 K/UL — SIGNIFICANT CHANGE UP (ref 1–3.3)
LYMPHOCYTES # BLD AUTO: 30.2 % — SIGNIFICANT CHANGE UP (ref 13–44)
MCHC RBC-ENTMCNC: 32.7 PG — SIGNIFICANT CHANGE UP (ref 27–34)
MCHC RBC-ENTMCNC: 33.9 G/DL — SIGNIFICANT CHANGE UP (ref 32–36)
MCV RBC AUTO: 96.7 FL — SIGNIFICANT CHANGE UP (ref 80–100)
MONOCYTES # BLD AUTO: 0.46 K/UL — SIGNIFICANT CHANGE UP (ref 0–0.9)
MONOCYTES NFR BLD AUTO: 10 % — SIGNIFICANT CHANGE UP (ref 2–14)
MRSA PCR RESULT.: NEGATIVE — SIGNIFICANT CHANGE UP
NEUTROPHILS # BLD AUTO: 2.7 K/UL — SIGNIFICANT CHANGE UP (ref 1.8–7.4)
NEUTROPHILS NFR BLD AUTO: 58.8 % — SIGNIFICANT CHANGE UP (ref 43–77)
NITRITE UR-MCNC: NEGATIVE — SIGNIFICANT CHANGE UP
NON HDL CHOLESTEROL: 105 MG/DL — SIGNIFICANT CHANGE UP
NRBC # BLD: 0 /100 WBCS — SIGNIFICANT CHANGE UP (ref 0–0)
NT-PROBNP SERPL-SCNC: 1606 PG/ML — HIGH (ref 0–300)
OSMOLALITY UR: 181 MOSM/KG — LOW (ref 300–900)
PH UR: 7 — SIGNIFICANT CHANGE UP (ref 5–8)
PLATELET # BLD AUTO: 196 K/UL — SIGNIFICANT CHANGE UP (ref 150–400)
POTASSIUM SERPL-MCNC: 3.8 MMOL/L — SIGNIFICANT CHANGE UP (ref 3.5–5.3)
POTASSIUM SERPL-MCNC: 4.2 MMOL/L — SIGNIFICANT CHANGE UP (ref 3.5–5.3)
POTASSIUM SERPL-SCNC: 3.8 MMOL/L — SIGNIFICANT CHANGE UP (ref 3.5–5.3)
POTASSIUM SERPL-SCNC: 4.2 MMOL/L — SIGNIFICANT CHANGE UP (ref 3.5–5.3)
POTASSIUM UR-SCNC: 12 MMOL/L — SIGNIFICANT CHANGE UP
PROT ?TM UR-MCNC: <4 MG/DL — SIGNIFICANT CHANGE UP (ref 0–12)
PROT SERPL-MCNC: 6.9 G/DL — SIGNIFICANT CHANGE UP (ref 6–8.3)
PROT SERPL-MCNC: 8 G/DL — SIGNIFICANT CHANGE UP (ref 6–8.3)
PROT UR-MCNC: NEGATIVE MG/DL — SIGNIFICANT CHANGE UP
PROT/CREAT UR-RTO: SIGNIFICANT CHANGE UP (ref 0–0.2)
PROTHROM AB SERPL-ACNC: 11.8 SEC — SIGNIFICANT CHANGE UP (ref 9.9–13.4)
RBC # BLD: 3.91 M/UL — SIGNIFICANT CHANGE UP (ref 3.8–5.2)
RBC # FLD: 13.9 % — SIGNIFICANT CHANGE UP (ref 10.3–14.5)
RH IG SCN BLD-IMP: POSITIVE — SIGNIFICANT CHANGE UP
RH IG SCN BLD-IMP: POSITIVE — SIGNIFICANT CHANGE UP
S AUREUS DNA NOSE QL NAA+PROBE: NEGATIVE — SIGNIFICANT CHANGE UP
SODIUM SERPL-SCNC: 126 MMOL/L — LOW (ref 135–145)
SODIUM SERPL-SCNC: 130 MMOL/L — LOW (ref 135–145)
SODIUM UR-SCNC: 59 MMOL/L — SIGNIFICANT CHANGE UP
SP GR SPEC: 1 — SIGNIFICANT CHANGE UP (ref 1–1.03)
TRIGL SERPL-MCNC: 69 MG/DL — SIGNIFICANT CHANGE UP
TSH SERPL-MCNC: 3.47 UIU/ML — SIGNIFICANT CHANGE UP (ref 0.27–4.2)
UROBILINOGEN FLD QL: 0.2 MG/DL — SIGNIFICANT CHANGE UP (ref 0.2–1)
UUN UR-MCNC: 122 MG/DL — SIGNIFICANT CHANGE UP
WBC # BLD: 4.6 K/UL — SIGNIFICANT CHANGE UP (ref 3.8–10.5)
WBC # FLD AUTO: 4.6 K/UL — SIGNIFICANT CHANGE UP (ref 3.8–10.5)

## 2024-12-22 PROCEDURE — 93880 EXTRACRANIAL BILAT STUDY: CPT | Mod: 26

## 2024-12-22 PROCEDURE — 93010 ELECTROCARDIOGRAM REPORT: CPT

## 2024-12-22 PROCEDURE — 71045 X-RAY EXAM CHEST 1 VIEW: CPT | Mod: 26

## 2024-12-22 RX ORDER — ACETAMINOPHEN 80 MG/.8ML
650 SOLUTION/ DROPS ORAL EVERY 6 HOURS
Refills: 0 | Status: DISCONTINUED | OUTPATIENT
Start: 2024-12-22 | End: 2024-12-23

## 2024-12-22 RX ORDER — PANTOPRAZOLE 40 MG/1
40 TABLET, DELAYED RELEASE ORAL
Refills: 0 | Status: DISCONTINUED | OUTPATIENT
Start: 2024-12-22 | End: 2024-12-23

## 2024-12-22 RX ORDER — CHLORHEXIDINE GLUCONATE 1.2 MG/ML
1 RINSE ORAL ONCE
Refills: 0 | Status: COMPLETED | OUTPATIENT
Start: 2024-12-22 | End: 2024-12-22

## 2024-12-22 RX ORDER — MUPIROCIN 2 %
1 OINTMENT (GRAM) TOPICAL
Refills: 0 | Status: DISCONTINUED | OUTPATIENT
Start: 2024-12-22 | End: 2024-12-23

## 2024-12-22 RX ORDER — SODIUM CHLORIDE 9 MG/ML
3 INJECTION, SOLUTION INTRAMUSCULAR; INTRAVENOUS; SUBCUTANEOUS EVERY 8 HOURS
Refills: 0 | Status: DISCONTINUED | OUTPATIENT
Start: 2024-12-22 | End: 2024-12-23

## 2024-12-22 RX ORDER — LATANOPROST 50 UG/ML
1 SOLUTION OPHTHALMIC AT BEDTIME
Refills: 0 | Status: DISCONTINUED | OUTPATIENT
Start: 2024-12-22 | End: 2024-12-23

## 2024-12-22 RX ORDER — CHLORHEXIDINE GLUCONATE 1.2 MG/ML
1 RINSE ORAL ONCE
Refills: 0 | Status: COMPLETED | OUTPATIENT
Start: 2024-12-23 | End: 2024-12-23

## 2024-12-22 RX ORDER — ASCORBIC ACID 1000 MG
2000 TABLET ORAL ONCE
Refills: 0 | Status: COMPLETED | OUTPATIENT
Start: 2024-12-22 | End: 2024-12-23

## 2024-12-22 RX ORDER — ACETAMINOPHEN 80 MG/.8ML
1000 SOLUTION/ DROPS ORAL ONCE
Refills: 0 | Status: COMPLETED | OUTPATIENT
Start: 2024-12-22 | End: 2024-12-23

## 2024-12-22 RX ORDER — HEPARIN SODIUM 1000 [USP'U]/ML
550 INJECTION, SOLUTION INTRAVENOUS; SUBCUTANEOUS
Qty: 25000 | Refills: 0 | Status: DISCONTINUED | OUTPATIENT
Start: 2024-12-22 | End: 2024-12-23

## 2024-12-22 RX ORDER — SODIUM CHLORIDE 9 MG/ML
500 INJECTION, SOLUTION INTRAMUSCULAR; INTRAVENOUS; SUBCUTANEOUS ONCE
Refills: 0 | Status: COMPLETED | OUTPATIENT
Start: 2024-12-22 | End: 2024-12-22

## 2024-12-22 RX ORDER — METOPROLOL TARTRATE 50 MG
25 TABLET ORAL
Refills: 0 | Status: DISCONTINUED | OUTPATIENT
Start: 2024-12-22 | End: 2024-12-23

## 2024-12-22 RX ORDER — CHLORHEXIDINE GLUCONATE 1.2 MG/ML
15 RINSE ORAL ONCE
Refills: 0 | Status: COMPLETED | OUTPATIENT
Start: 2024-12-23 | End: 2024-12-23

## 2024-12-22 RX ADMIN — LATANOPROST 1 DROP(S): 50 SOLUTION OPHTHALMIC at 22:06

## 2024-12-22 RX ADMIN — CHLORHEXIDINE GLUCONATE 1 APPLICATION(S): 1.2 RINSE ORAL at 22:16

## 2024-12-22 RX ADMIN — HEPARIN SODIUM 5.5 UNIT(S)/HR: 1000 INJECTION, SOLUTION INTRAVENOUS; SUBCUTANEOUS at 16:45

## 2024-12-22 RX ADMIN — Medication 25 MILLIGRAM(S): at 17:40

## 2024-12-22 RX ADMIN — SODIUM CHLORIDE 3 MILLILITER(S): 9 INJECTION, SOLUTION INTRAMUSCULAR; INTRAVENOUS; SUBCUTANEOUS at 15:15

## 2024-12-22 RX ADMIN — Medication 1 APPLICATION(S): at 17:52

## 2024-12-22 RX ADMIN — CHLORHEXIDINE GLUCONATE 1 APPLICATION(S): 1.2 RINSE ORAL at 20:17

## 2024-12-22 RX ADMIN — SODIUM CHLORIDE 3 MILLILITER(S): 9 INJECTION, SOLUTION INTRAMUSCULAR; INTRAVENOUS; SUBCUTANEOUS at 22:17

## 2024-12-22 RX ADMIN — SODIUM CHLORIDE 250 MILLILITER(S): 9 INJECTION, SOLUTION INTRAMUSCULAR; INTRAVENOUS; SUBCUTANEOUS at 15:15

## 2024-12-22 NOTE — H&P ADULT - NSHPREVIEWOFSYSTEMS_GEN_ALL_CORE
Review of Systems  CONSTITUTIONAL:  Denies Fevers / chills, sweats, fatigue, weight loss, weight gain                                      NEURO:  Denies changes in sensation, seizures, syncope, confusion                                                                            EYES:  Denies Blurry vision, discharge, pain, loss of vision                                                                                    ENMT:  Denies Difficulty hearing, vertigo, dysphagia, epistaxis, recent dental work                                       CV:  Denies Chest pain, palpitations, SIDDIQUI, orthopnea                                                                                          RESPIRATORY:  Denies Wheezing, SOB, cough / sputum, hemoptysis                                                                GI:  Denies Nausea, vomiting, diarrhea, constipation, melena, difficulty swallowing                                               : Denies Hematuria, dysuria, urgency, incontinence                                                                                         MUSKULOSKELETAL:  Denies arthritis, joint swelling, muscle weakness                                                             SKIN/BREAST:  Denies rash, itching, hair loss, masses                                                                                            PSYCH:  Denies depression, anxiety, suicidal ideation                                                                                               HEME/LYMPH:  Denies bruises easily, enlarged lymph nodes, tender lymph nodes                                        ENDOCRINE:  Denies cold intolerance, heat intolerance, polydipsia

## 2024-12-22 NOTE — CONSULT NOTE ADULT - ASSESSMENT
60 yo F w/ PMH of reported hyponatremia, HTN, HLD, CAD/MI, HFpEF, afib, TIA presenting for planned AVR, [place], LAAO on 12/23. Noted to have sodium 126 on initial labs. No associated symptoms reported. Nephrology consulted for further management of hyponatremia.    Asymptomatic hyponatremia - daughter reports history of hyponatremia. Patient takes loop diuretic at home, no thiazide use reported. Denies significantly decreased PO intake. No symptoms.  - Repeat BMP at 4PM  - S/p 500cc NS bolus per primary team, recommend holding further IV fluids  - Check serum osm, urine osm, urine sodium  - Renal function not significantly abnormal  - TSH noted  - Encourage PO intake  - 1.5L/day fluid restriction for now  - Avoid hypotonic IV fluids

## 2024-12-22 NOTE — PATIENT PROFILE ADULT - OVER THE PAST TWO WEEKS HAVE YOU FELT DOWN, DEPRESSED OR HOPELESS?
Patient returns a call to the clinic and is informed of below message. States she was told by endocrinology \"there's nothing they can do.\" Reports having just returned home from seeing the eye doctor and was told she needs to have surgery.     Patient indicates she will discuss her weight concerns further with PCP at her appointment on 7/11/18.   no

## 2024-12-22 NOTE — H&P ADULT - HISTORY OF PRESENT ILLNESS
58 year old Amharic speaking female with history of HTN, HLD (not on statin 2/2 elevated LFT's), Anemia, NSTEMI 2011 (no PCI), ? TIA in 2011 (no deficits), pAfib (on Eliquis), HFpEF (EF 55-60%), gallstones, presents today as a pre op admission for AVR, MYESHA Phan with Dr. Gray on 12/23. Pt denies CP, SOB, cough, fever, NVD on arrival to hospital.

## 2024-12-22 NOTE — H&P ADULT - ASSESSMENT
58 year old Maltese speaking female with history of HTN, HLD (not on statin 2/2 elevated LFT's), Anemia, NSTEMI 2011 (no PCI), ? TIA in 2011 (no deficits), pAfib (on Eliquis), HFpEF (EF 55-60%), gallstones, presents today as a pre op admission for AVR, encompass MYESHA Mlahotra with Dr. Gray on 12/23. Pt denies CP, SOB, cough, fever, NVD on arrival to hospital. ON arrival pt found to be hyponatremic to 126, will give NS bolus, send urine studies, and consult nephrology. Heparin gtt started for afib.     Plan  -OR tomorrow for AVR, encompass PHILLIP malhotra  -heparin gtt to start for afib  -Sodium 126, will attempt NS bolus to correct, pending renal reccs and urine studies

## 2024-12-22 NOTE — CONSULT NOTE ADULT - SUBJECTIVE AND OBJECTIVE BOX
NEPHROLOGY SERVICE INITIAL CONSULT NOTE    HPI:  58 year old Lao speaking female with history of HTN, HLD (not on statin 2/2 elevated LFT's), Anemia, NSTEMI 2011 (no PCI), ? TIA in 2011 (no deficits), pAfib (on Eliquis), HFpEF (EF 55-60%), gallstones, presents today as a pre op admission for AVR, MYESHA Phan with Dr. Gray on 12/23. Pt denies CP, SOB, cough, fever, NVD on arrival to hospital.    (22 Dec 2024 14:25)      REVIEW OF SYSTEMS:   Otherwise negative except as specified in HPI    PAST MEDICAL AND SURGICAL HISTORY:   PAST MEDICAL & SURGICAL HISTORY:  HTN (hypertension)      HLD (hyperlipidemia)      GERD (gastroesophageal reflux disease)      Anemia      MI (myocardial infarction)      Paroxysmal atrial fibrillation      Severe aortic regurgitation      Cyst of left eyelid  Cyst of the left eye removed in the past month, pt still currently taking opthalmic medications          FAMILY HISTORY:  FAMILY HISTORY:    Otherwise non-contributory to current admission    SOCIAL HISTORY:  Tobacco use: Denies  EtOH use: Denies  Illicit drug use: Denies    HOME MEDICATIONS:      ACTIVE MEDICATIONS:  MEDICATIONS  (STANDING):  acetaminophen     Tablet .. 1000 milliGRAM(s) Oral once  ascorbic acid 2000 milliGRAM(s) Oral once  chlorhexidine 4% Liquid 1 Application(s) Topical once  chlorhexidine 4% Liquid 1 Application(s) Topical once  heparin  Infusion 550 Unit(s)/Hr (5.5 mL/Hr) IV Continuous <Continuous>  latanoprost 0.005% Ophthalmic Solution 1 Drop(s) Both EYES at bedtime  metoprolol tartrate 25 milliGRAM(s) Oral two times a day  mupirocin 2% Ointment 1 Application(s) Both Nostrils two times a day  pantoprazole    Tablet 40 milliGRAM(s) Oral before breakfast  sodium chloride 0.9% lock flush 3 milliLiter(s) IV Push every 8 hours    MEDICATIONS  (PRN):  acetaminophen     Tablet .. 650 milliGRAM(s) Oral every 6 hours PRN Mild Pain (1 - 3)      ALLERGIES:  Allergies    No Known Allergies    Intolerances        VITAL SIGNS:  Vital Signs Last 24 Hrs  T(C): 36.6 (22 Dec 2024 12:14), Max: 36.6 (22 Dec 2024 12:14)  T(F): 97.8 (22 Dec 2024 12:14), Max: 97.8 (22 Dec 2024 12:14)  HR: 60 (22 Dec 2024 13:45) (60 - 72)  BP: 115/56 (22 Dec 2024 13:45) (115/56 - 144/66)  BP(mean): 81 (22 Dec 2024 13:45) (81 - 95)  RR: 16 (22 Dec 2024 13:45) (16 - 18)  SpO2: 98% (22 Dec 2024 13:45) (96% - 98%)    Parameters below as of 22 Dec 2024 13:45  Patient On (Oxygen Delivery Method): room air          PHYSICAL EXAM:  Constitutional: NAD, lying in bed  Head: NCAT, EOMI  Respiratory: CTAB, no respiratory distress  Cardiac: RRR, +S1/S2  Gastrointestinal: abdomen soft, NT/ND  Genitourinary: no gomez  Extremities: No LE edema, no clubbing/cyanosis  Dermatologic: no rash on exposed skin, warm and well perfused  Neurologic: Awake, alert, interactive  Access:    LABS:                        12.8   4.60  )-----------( 196      ( 22 Dec 2024 12:07 )             37.8     12-22    126[L]  |  93[L]  |  24[H]  ----------------------------<  129[H]  4.2   |  24  |  0.89    Ca    8.9      22 Dec 2024 12:07    TPro  6.9  /  Alb  3.9  /  TBili  0.3  /  DBili  x   /  AST  40  /  ALT  14  /  AlkPhos  96  12-22    PT/INR - ( 22 Dec 2024 12:07 )   PT: 11.8 sec;   INR: 1.01          PTT - ( 22 Dec 2024 12:07 )  PTT:36.6 sec  Urinalysis Basic - ( 22 Dec 2024 12:07 )    Color: x / Appearance: x / SG: x / pH: x  Gluc: 129 mg/dL / Ketone: x  / Bili: x / Urobili: x   Blood: x / Protein: x / Nitrite: x   Leuk Esterase: x / RBC: x / WBC x   Sq Epi: x / Non Sq Epi: x / Bacteria: x          CAPILLARY BLOOD GLUCOSE              RADIOLOGY & ADDITIONAL TESTS: Reviewed. NEPHROLOGY SERVICE INITIAL CONSULT NOTE    Osei Gonzalez is a 58 yo F w/ PMH of reported hyponatremia, HTN, HLD, CAD/MI, HFpEF, afib, TIA presenting for planned AVR, [place], LAAO on 12/23. Noted to have sodium 126 on initial labs. No associated symptoms reported. Nephrology consulted for further management of hyponatremia.      REVIEW OF SYSTEMS:   Otherwise negative except as specified in HPI    PAST MEDICAL AND SURGICAL HISTORY:   PAST MEDICAL & SURGICAL HISTORY:  HTN (hypertension)      HLD (hyperlipidemia)      GERD (gastroesophageal reflux disease)      Anemia      MI (myocardial infarction)      Paroxysmal atrial fibrillation      Severe aortic regurgitation      Cyst of left eyelid  Cyst of the left eye removed in the past month, pt still currently taking opthalmic medications          FAMILY HISTORY:  FAMILY HISTORY:    Otherwise non-contributory to current admission    SOCIAL HISTORY:  Tobacco use: Denies  EtOH use: Denies  Illicit drug use: Denies    HOME MEDICATIONS:      ACTIVE MEDICATIONS:  MEDICATIONS  (STANDING):  acetaminophen     Tablet .. 1000 milliGRAM(s) Oral once  ascorbic acid 2000 milliGRAM(s) Oral once  chlorhexidine 4% Liquid 1 Application(s) Topical once  chlorhexidine 4% Liquid 1 Application(s) Topical once  heparin  Infusion 550 Unit(s)/Hr (5.5 mL/Hr) IV Continuous <Continuous>  latanoprost 0.005% Ophthalmic Solution 1 Drop(s) Both EYES at bedtime  metoprolol tartrate 25 milliGRAM(s) Oral two times a day  mupirocin 2% Ointment 1 Application(s) Both Nostrils two times a day  pantoprazole    Tablet 40 milliGRAM(s) Oral before breakfast  sodium chloride 0.9% lock flush 3 milliLiter(s) IV Push every 8 hours    MEDICATIONS  (PRN):  acetaminophen     Tablet .. 650 milliGRAM(s) Oral every 6 hours PRN Mild Pain (1 - 3)      ALLERGIES:  Allergies    No Known Allergies    Intolerances        VITAL SIGNS:  Vital Signs Last 24 Hrs  T(C): 36.6 (22 Dec 2024 12:14), Max: 36.6 (22 Dec 2024 12:14)  T(F): 97.8 (22 Dec 2024 12:14), Max: 97.8 (22 Dec 2024 12:14)  HR: 60 (22 Dec 2024 13:45) (60 - 72)  BP: 115/56 (22 Dec 2024 13:45) (115/56 - 144/66)  BP(mean): 81 (22 Dec 2024 13:45) (81 - 95)  RR: 16 (22 Dec 2024 13:45) (16 - 18)  SpO2: 98% (22 Dec 2024 13:45) (96% - 98%)    Parameters below as of 22 Dec 2024 13:45  Patient On (Oxygen Delivery Method): room air          PHYSICAL EXAM:  Constitutional: NAD, lying in bed  Head: NCAT, EOMI  Respiratory: CTAB, no respiratory distress  Cardiac: RRR, +S1/S2  Gastrointestinal: abdomen soft, NT/ND  Extremities: No LE edema, no clubbing/cyanosis  Dermatologic: no rash on exposed skin, warm and well perfused  Neurologic: Awake, alert, interactive    LABS:                        12.8   4.60  )-----------( 196      ( 22 Dec 2024 12:07 )             37.8     12-22    126[L]  |  93[L]  |  24[H]  ----------------------------<  129[H]  4.2   |  24  |  0.89    Ca    8.9      22 Dec 2024 12:07    TPro  6.9  /  Alb  3.9  /  TBili  0.3  /  DBili  x   /  AST  40  /  ALT  14  /  AlkPhos  96  12-22    PT/INR - ( 22 Dec 2024 12:07 )   PT: 11.8 sec;   INR: 1.01          PTT - ( 22 Dec 2024 12:07 )  PTT:36.6 sec  Urinalysis Basic - ( 22 Dec 2024 12:07 )    Color: x / Appearance: x / SG: x / pH: x  Gluc: 129 mg/dL / Ketone: x  / Bili: x / Urobili: x   Blood: x / Protein: x / Nitrite: x   Leuk Esterase: x / RBC: x / WBC x   Sq Epi: x / Non Sq Epi: x / Bacteria: x          CAPILLARY BLOOD GLUCOSE              RADIOLOGY & ADDITIONAL TESTS: Reviewed.

## 2024-12-23 ENCOUNTER — TRANSCRIPTION ENCOUNTER (OUTPATIENT)
Age: 59
End: 2024-12-23

## 2024-12-23 ENCOUNTER — RESULT REVIEW (OUTPATIENT)
Age: 59
End: 2024-12-23

## 2024-12-23 ENCOUNTER — APPOINTMENT (OUTPATIENT)
Dept: CARDIOTHORACIC SURGERY | Facility: HOSPITAL | Age: 59
End: 2024-12-23

## 2024-12-23 LAB
ALBUMIN SERPL ELPH-MCNC: 3.3 G/DL — SIGNIFICANT CHANGE UP (ref 3.3–5)
ALBUMIN SERPL ELPH-MCNC: 3.6 G/DL — SIGNIFICANT CHANGE UP (ref 3.3–5)
ALP SERPL-CCNC: 62 U/L — SIGNIFICANT CHANGE UP (ref 40–120)
ALP SERPL-CCNC: 67 U/L — SIGNIFICANT CHANGE UP (ref 40–120)
ALT FLD-CCNC: 14 U/L — SIGNIFICANT CHANGE UP (ref 10–45)
ALT FLD-CCNC: 17 U/L — SIGNIFICANT CHANGE UP (ref 10–45)
ANION GAP SERPL CALC-SCNC: 11 MMOL/L — SIGNIFICANT CHANGE UP (ref 5–17)
ANION GAP SERPL CALC-SCNC: 13 MMOL/L — SIGNIFICANT CHANGE UP (ref 5–17)
ANION GAP SERPL CALC-SCNC: 9 MMOL/L — SIGNIFICANT CHANGE UP (ref 5–17)
APTT BLD: 41.3 SEC — HIGH (ref 24.5–35.6)
APTT BLD: 57.3 SEC — HIGH (ref 24.5–35.6)
APTT BLD: 89 SEC — HIGH (ref 24.5–35.6)
AST SERPL-CCNC: 107 U/L — HIGH (ref 10–40)
AST SERPL-CCNC: 82 U/L — HIGH (ref 10–40)
BASE EXCESS BLDV CALC-SCNC: -3.3 MMOL/L — LOW (ref -2–3)
BASOPHILS # BLD AUTO: 0.01 K/UL — SIGNIFICANT CHANGE UP (ref 0–0.2)
BASOPHILS # BLD AUTO: 0.01 K/UL — SIGNIFICANT CHANGE UP (ref 0–0.2)
BASOPHILS NFR BLD AUTO: 0.1 % — SIGNIFICANT CHANGE UP (ref 0–2)
BASOPHILS NFR BLD AUTO: 0.1 % — SIGNIFICANT CHANGE UP (ref 0–2)
BILIRUB SERPL-MCNC: 0.8 MG/DL — SIGNIFICANT CHANGE UP (ref 0.2–1.2)
BILIRUB SERPL-MCNC: 1.8 MG/DL — HIGH (ref 0.2–1.2)
BUN SERPL-MCNC: 21 MG/DL — SIGNIFICANT CHANGE UP (ref 7–23)
BUN SERPL-MCNC: 6 MG/DL — LOW (ref 7–23)
BUN SERPL-MCNC: 8 MG/DL — SIGNIFICANT CHANGE UP (ref 7–23)
CALCIUM SERPL-MCNC: 8.2 MG/DL — LOW (ref 8.4–10.5)
CALCIUM SERPL-MCNC: 8.4 MG/DL — SIGNIFICANT CHANGE UP (ref 8.4–10.5)
CALCIUM SERPL-MCNC: 8.8 MG/DL — SIGNIFICANT CHANGE UP (ref 8.4–10.5)
CHLORIDE SERPL-SCNC: 102 MMOL/L — SIGNIFICANT CHANGE UP (ref 96–108)
CHLORIDE SERPL-SCNC: 102 MMOL/L — SIGNIFICANT CHANGE UP (ref 96–108)
CHLORIDE SERPL-SCNC: 104 MMOL/L — SIGNIFICANT CHANGE UP (ref 96–108)
CO2 BLDV-SCNC: 25 MMOL/L — SIGNIFICANT CHANGE UP (ref 22–26)
CO2 SERPL-SCNC: 22 MMOL/L — SIGNIFICANT CHANGE UP (ref 22–31)
CO2 SERPL-SCNC: 23 MMOL/L — SIGNIFICANT CHANGE UP (ref 22–31)
CO2 SERPL-SCNC: 23 MMOL/L — SIGNIFICANT CHANGE UP (ref 22–31)
CREAT SERPL-MCNC: 0.5 MG/DL — SIGNIFICANT CHANGE UP (ref 0.5–1.3)
CREAT SERPL-MCNC: 0.54 MG/DL — SIGNIFICANT CHANGE UP (ref 0.5–1.3)
CREAT SERPL-MCNC: 0.69 MG/DL — SIGNIFICANT CHANGE UP (ref 0.5–1.3)
EGFR: 105 ML/MIN/1.73M2 — SIGNIFICANT CHANGE UP
EGFR: 107 ML/MIN/1.73M2 — SIGNIFICANT CHANGE UP
EGFR: 99 ML/MIN/1.73M2 — SIGNIFICANT CHANGE UP
EOSINOPHIL # BLD AUTO: 0 K/UL — SIGNIFICANT CHANGE UP (ref 0–0.5)
EOSINOPHIL # BLD AUTO: 0.01 K/UL — SIGNIFICANT CHANGE UP (ref 0–0.5)
EOSINOPHIL NFR BLD AUTO: 0 % — SIGNIFICANT CHANGE UP (ref 0–6)
EOSINOPHIL NFR BLD AUTO: 0.1 % — SIGNIFICANT CHANGE UP (ref 0–6)
GAS PNL BLDA: SIGNIFICANT CHANGE UP
GAS PNL BLDA: SIGNIFICANT CHANGE UP
GAS PNL BLDV: SIGNIFICANT CHANGE UP
GLUCOSE SERPL-MCNC: 104 MG/DL — HIGH (ref 70–99)
GLUCOSE SERPL-MCNC: 146 MG/DL — HIGH (ref 70–99)
GLUCOSE SERPL-MCNC: 221 MG/DL — HIGH (ref 70–99)
HCO3 BLDV-SCNC: 23 MMOL/L — SIGNIFICANT CHANGE UP (ref 22–29)
HCT VFR BLD CALC: 33.1 % — LOW (ref 34.5–45)
HCT VFR BLD CALC: 35.2 % — SIGNIFICANT CHANGE UP (ref 34.5–45)
HCT VFR BLD CALC: 38.9 % — SIGNIFICANT CHANGE UP (ref 34.5–45)
HGB BLD-MCNC: 11.1 G/DL — LOW (ref 11.5–15.5)
HGB BLD-MCNC: 11.7 G/DL — SIGNIFICANT CHANGE UP (ref 11.5–15.5)
HGB BLD-MCNC: 12.9 G/DL — SIGNIFICANT CHANGE UP (ref 11.5–15.5)
IMM GRANULOCYTES NFR BLD AUTO: 0.4 % — SIGNIFICANT CHANGE UP (ref 0–0.9)
IMM GRANULOCYTES NFR BLD AUTO: 0.6 % — SIGNIFICANT CHANGE UP (ref 0–0.9)
INR BLD: 0.98 — SIGNIFICANT CHANGE UP (ref 0.85–1.16)
INR BLD: 0.99 — SIGNIFICANT CHANGE UP (ref 0.85–1.16)
INR BLD: 1.03 — SIGNIFICANT CHANGE UP (ref 0.85–1.16)
LACTATE SERPL-SCNC: 1.5 MMOL/L — SIGNIFICANT CHANGE UP (ref 0.5–2)
LACTATE SERPL-SCNC: 1.5 MMOL/L — SIGNIFICANT CHANGE UP (ref 0.5–2)
LYMPHOCYTES # BLD AUTO: 0.68 K/UL — LOW (ref 1–3.3)
LYMPHOCYTES # BLD AUTO: 0.76 K/UL — LOW (ref 1–3.3)
LYMPHOCYTES # BLD AUTO: 10.1 % — LOW (ref 13–44)
LYMPHOCYTES # BLD AUTO: 9 % — LOW (ref 13–44)
MAGNESIUM SERPL-MCNC: 1.8 MG/DL — SIGNIFICANT CHANGE UP (ref 1.6–2.6)
MAGNESIUM SERPL-MCNC: 2.5 MG/DL — SIGNIFICANT CHANGE UP (ref 1.6–2.6)
MCHC RBC-ENTMCNC: 31.6 PG — SIGNIFICANT CHANGE UP (ref 27–34)
MCHC RBC-ENTMCNC: 31.7 PG — SIGNIFICANT CHANGE UP (ref 27–34)
MCHC RBC-ENTMCNC: 32 PG — SIGNIFICANT CHANGE UP (ref 27–34)
MCHC RBC-ENTMCNC: 33.2 G/DL — SIGNIFICANT CHANGE UP (ref 32–36)
MCHC RBC-ENTMCNC: 33.2 G/DL — SIGNIFICANT CHANGE UP (ref 32–36)
MCHC RBC-ENTMCNC: 33.5 G/DL — SIGNIFICANT CHANGE UP (ref 32–36)
MCV RBC AUTO: 94.6 FL — SIGNIFICANT CHANGE UP (ref 80–100)
MCV RBC AUTO: 95.1 FL — SIGNIFICANT CHANGE UP (ref 80–100)
MCV RBC AUTO: 96.5 FL — SIGNIFICANT CHANGE UP (ref 80–100)
MONOCYTES # BLD AUTO: 0.42 K/UL — SIGNIFICANT CHANGE UP (ref 0–0.9)
MONOCYTES # BLD AUTO: 0.83 K/UL — SIGNIFICANT CHANGE UP (ref 0–0.9)
MONOCYTES NFR BLD AUTO: 6.3 % — SIGNIFICANT CHANGE UP (ref 2–14)
MONOCYTES NFR BLD AUTO: 9.9 % — SIGNIFICANT CHANGE UP (ref 2–14)
NEUTROPHILS # BLD AUTO: 5.55 K/UL — SIGNIFICANT CHANGE UP (ref 1.8–7.4)
NEUTROPHILS # BLD AUTO: 6.79 K/UL — SIGNIFICANT CHANGE UP (ref 1.8–7.4)
NEUTROPHILS NFR BLD AUTO: 80.6 % — HIGH (ref 43–77)
NEUTROPHILS NFR BLD AUTO: 82.8 % — HIGH (ref 43–77)
NRBC # BLD: 0 /100 WBCS — SIGNIFICANT CHANGE UP (ref 0–0)
PCO2 BLDV: 46 MMHG — HIGH (ref 39–42)
PH BLDV: 7.31 — LOW (ref 7.32–7.43)
PHOSPHATE SERPL-MCNC: 2.2 MG/DL — LOW (ref 2.5–4.5)
PLATELET # BLD AUTO: 116 K/UL — LOW (ref 150–400)
PLATELET # BLD AUTO: 186 K/UL — SIGNIFICANT CHANGE UP (ref 150–400)
PLATELET # BLD AUTO: 98 K/UL — LOW (ref 150–400)
PO2 BLDV: 47 MMHG — HIGH (ref 25–45)
POTASSIUM SERPL-MCNC: 3.4 MMOL/L — LOW (ref 3.5–5.3)
POTASSIUM SERPL-MCNC: 3.8 MMOL/L — SIGNIFICANT CHANGE UP (ref 3.5–5.3)
POTASSIUM SERPL-MCNC: 4.2 MMOL/L — SIGNIFICANT CHANGE UP (ref 3.5–5.3)
POTASSIUM SERPL-SCNC: 3.4 MMOL/L — LOW (ref 3.5–5.3)
POTASSIUM SERPL-SCNC: 3.8 MMOL/L — SIGNIFICANT CHANGE UP (ref 3.5–5.3)
POTASSIUM SERPL-SCNC: 4.2 MMOL/L — SIGNIFICANT CHANGE UP (ref 3.5–5.3)
PROT SERPL-MCNC: 5.3 G/DL — LOW (ref 6–8.3)
PROT SERPL-MCNC: 5.9 G/DL — LOW (ref 6–8.3)
PROTHROM AB SERPL-ACNC: 11.3 SEC — SIGNIFICANT CHANGE UP (ref 9.9–13.4)
PROTHROM AB SERPL-ACNC: 11.6 SEC — SIGNIFICANT CHANGE UP (ref 9.9–13.4)
PROTHROM AB SERPL-ACNC: 11.8 SEC — SIGNIFICANT CHANGE UP (ref 9.9–13.4)
RBC # BLD: 3.5 M/UL — LOW (ref 3.8–5.2)
RBC # BLD: 3.7 M/UL — LOW (ref 3.8–5.2)
RBC # BLD: 4.03 M/UL — SIGNIFICANT CHANGE UP (ref 3.8–5.2)
RBC # FLD: 13.8 % — SIGNIFICANT CHANGE UP (ref 10.3–14.5)
RBC # FLD: 14.5 % — SIGNIFICANT CHANGE UP (ref 10.3–14.5)
RBC # FLD: 14.7 % — HIGH (ref 10.3–14.5)
SAO2 % BLDV: 76.5 % — SIGNIFICANT CHANGE UP (ref 67–88)
SODIUM SERPL-SCNC: 134 MMOL/L — LOW (ref 135–145)
SODIUM SERPL-SCNC: 136 MMOL/L — SIGNIFICANT CHANGE UP (ref 135–145)
SODIUM SERPL-SCNC: 139 MMOL/L — SIGNIFICANT CHANGE UP (ref 135–145)
WBC # BLD: 3.68 K/UL — LOW (ref 3.8–10.5)
WBC # BLD: 6.71 K/UL — SIGNIFICANT CHANGE UP (ref 3.8–10.5)
WBC # BLD: 8.42 K/UL — SIGNIFICANT CHANGE UP (ref 3.8–10.5)
WBC # FLD AUTO: 3.68 K/UL — LOW (ref 3.8–10.5)
WBC # FLD AUTO: 6.71 K/UL — SIGNIFICANT CHANGE UP (ref 3.8–10.5)
WBC # FLD AUTO: 8.42 K/UL — SIGNIFICANT CHANGE UP (ref 3.8–10.5)

## 2024-12-23 PROCEDURE — 99291 CRITICAL CARE FIRST HOUR: CPT

## 2024-12-23 PROCEDURE — 88305 TISSUE EXAM BY PATHOLOGIST: CPT | Mod: 26

## 2024-12-23 PROCEDURE — 71045 X-RAY EXAM CHEST 1 VIEW: CPT | Mod: 26

## 2024-12-23 PROCEDURE — 33405 REPLACEMENT AORTIC VALVE OPN: CPT | Mod: AS

## 2024-12-23 PROCEDURE — 33859 AS-AORT GRF F/DS OTH/THN DSJ: CPT | Mod: AS

## 2024-12-23 PROCEDURE — 93010 ELECTROCARDIOGRAM REPORT: CPT

## 2024-12-23 PROCEDURE — 33405 REPLACEMENT AORTIC VALVE OPN: CPT

## 2024-12-23 PROCEDURE — 33859 AS-AORT GRF F/DS OTH/THN DSJ: CPT

## 2024-12-23 PROCEDURE — 99232 SBSQ HOSP IP/OBS MODERATE 35: CPT

## 2024-12-23 DEVICE — BIOGLUE 10ML SYR: Type: IMPLANTABLE DEVICE | Status: FUNCTIONAL

## 2024-12-23 DEVICE — IMPLANTABLE DEVICE: Type: IMPLANTABLE DEVICE | Status: FUNCTIONAL

## 2024-12-23 DEVICE — CANNULA RETROGRADE CARDIOPLEGIA SELF-INFLATING 14FR PRE-SHAPED STYLET/HANDLE: Type: IMPLANTABLE DEVICE | Status: FUNCTIONAL

## 2024-12-23 DEVICE — VALVE AORTIC INSPIRIS RESILIA 23MM: Type: IMPLANTABLE DEVICE | Status: FUNCTIONAL

## 2024-12-23 DEVICE — SURGICEL FIBRILLAR 4 X 4": Type: IMPLANTABLE DEVICE | Status: FUNCTIONAL

## 2024-12-23 DEVICE — CANNULA AORTIC ROOT 14G X 14CM FLANGED: Type: IMPLANTABLE DEVICE | Status: FUNCTIONAL

## 2024-12-23 DEVICE — PATCH PERI-GUARD RPR 6X8CM: Type: IMPLANTABLE DEVICE | Status: FUNCTIONAL

## 2024-12-23 DEVICE — PACING WIRE STREAMLINE BIPOLAR MYOCARDIAL: Type: IMPLANTABLE DEVICE | Status: FUNCTIONAL

## 2024-12-23 DEVICE — ATRICLIP LAA EXCLUSION DEVICE 35MM FLEX: Type: IMPLANTABLE DEVICE | Status: FUNCTIONAL

## 2024-12-23 DEVICE — CANNULA CORONARY OSTIAL 14FR X 6" BASKET TIP: Type: IMPLANTABLE DEVICE | Status: FUNCTIONAL

## 2024-12-23 DEVICE — FELT PTFE 1 X 6": Type: IMPLANTABLE DEVICE | Status: FUNCTIONAL

## 2024-12-23 DEVICE — CATH VENOUS CONN 29/37FRX15X0.5IN: Type: IMPLANTABLE DEVICE | Status: FUNCTIONAL

## 2024-12-23 DEVICE — COR-KNOT QUICK LOAD SINGLES: Type: IMPLANTABLE DEVICE | Status: FUNCTIONAL

## 2024-12-23 DEVICE — KIT CVC 3LUM SPECTRUM 9FR: Type: IMPLANTABLE DEVICE | Status: FUNCTIONAL

## 2024-12-23 DEVICE — CANNULA ARTERIAL OPTISITE 16FR X 3/8" VENTED: Type: IMPLANTABLE DEVICE | Status: FUNCTIONAL

## 2024-12-23 DEVICE — SURGIFLO HEMOSTATIC MATRIX KIT: Type: IMPLANTABLE DEVICE | Status: FUNCTIONAL

## 2024-12-23 DEVICE — LIGATING CLIPS WECK HEMOCLIP TANTALUM LARGE (ORANGE) 10: Type: IMPLANTABLE DEVICE | Status: FUNCTIONAL

## 2024-12-23 RX ORDER — BISACODYL 5 MG
10 TABLET, DELAYED RELEASE (ENTERIC COATED) ORAL ONCE
Refills: 0 | Status: DISCONTINUED | OUTPATIENT
Start: 2024-12-25 | End: 2025-01-01

## 2024-12-23 RX ORDER — GABAPENTIN 300 MG/1
100 CAPSULE ORAL EVERY 8 HOURS
Refills: 0 | Status: DISCONTINUED | OUTPATIENT
Start: 2024-12-23 | End: 2024-12-25

## 2024-12-23 RX ORDER — CALCIUM GLUCONATE 94 MG/ML
2 INJECTION, SOLUTION INTRAVENOUS ONCE
Refills: 0 | Status: COMPLETED | OUTPATIENT
Start: 2024-12-23 | End: 2024-12-23

## 2024-12-23 RX ORDER — NICARDIPINE HYDROCHLORIDE 2.5 MG/ML
5 INJECTION INTRAVENOUS
Qty: 40 | Refills: 0 | Status: DISCONTINUED | OUTPATIENT
Start: 2024-12-23 | End: 2024-12-28

## 2024-12-23 RX ORDER — FENTANYL 75 UG/H
25 PATCH, EXTENDED RELEASE TRANSDERMAL EVERY 4 HOURS
Refills: 0 | Status: DISCONTINUED | OUTPATIENT
Start: 2024-12-23 | End: 2024-12-25

## 2024-12-23 RX ORDER — POTASSIUM PHOSPHATE, MONOBASIC AND POTASSIUM PHOSPHATE, DIBASIC 224; 236 MG/ML; MG/ML
15 INJECTION, SOLUTION INTRAVENOUS ONCE
Refills: 0 | Status: COMPLETED | OUTPATIENT
Start: 2024-12-23 | End: 2024-12-23

## 2024-12-23 RX ORDER — POLYETHYLENE GLYCOL 3350 17 G/DOSE
17 POWDER (GRAM) ORAL DAILY
Refills: 0 | Status: DISCONTINUED | OUTPATIENT
Start: 2024-12-24 | End: 2025-01-09

## 2024-12-23 RX ORDER — NOREPINEPHRINE BITARTRATE 1 MG/ML
0.05 INJECTION INTRAVENOUS
Qty: 8 | Refills: 0 | Status: DISCONTINUED | OUTPATIENT
Start: 2024-12-23 | End: 2024-12-24

## 2024-12-23 RX ORDER — SODIUM PHOSPHATE, MONOBASIC, MONOHYDRATE AND SODIUM PHOSPHATE, DIBASIC ANHYDROUS 142; 276 MG/ML; MG/ML
15 INJECTION, SOLUTION INTRAVENOUS ONCE
Refills: 0 | Status: DISCONTINUED | OUTPATIENT
Start: 2024-12-23 | End: 2024-12-23

## 2024-12-23 RX ORDER — CHLORHEXIDINE GLUCONATE 1.2 MG/ML
15 RINSE ORAL EVERY 12 HOURS
Refills: 0 | Status: DISCONTINUED | OUTPATIENT
Start: 2024-12-23 | End: 2024-12-25

## 2024-12-23 RX ORDER — SENNOSIDES 8.6 MG/1
2 TABLET, FILM COATED ORAL AT BEDTIME
Refills: 0 | Status: DISCONTINUED | OUTPATIENT
Start: 2024-12-24 | End: 2025-01-09

## 2024-12-23 RX ORDER — POTASSIUM CHLORIDE 600 MG/1
20 TABLET, FILM COATED, EXTENDED RELEASE ORAL
Refills: 0 | Status: COMPLETED | OUTPATIENT
Start: 2024-12-23 | End: 2024-12-24

## 2024-12-23 RX ORDER — SODIUM CHLORIDE 9 MG/ML
1000 INJECTION, SOLUTION INTRAMUSCULAR; INTRAVENOUS; SUBCUTANEOUS
Refills: 0 | Status: DISCONTINUED | OUTPATIENT
Start: 2024-12-23 | End: 2025-01-09

## 2024-12-23 RX ORDER — ASCORBIC ACID 1000 MG
500 TABLET ORAL
Refills: 0 | Status: COMPLETED | OUTPATIENT
Start: 2024-12-23 | End: 2024-12-28

## 2024-12-23 RX ORDER — PANTOPRAZOLE 40 MG/1
40 TABLET, DELAYED RELEASE ORAL DAILY
Refills: 0 | Status: DISCONTINUED | OUTPATIENT
Start: 2024-12-23 | End: 2024-12-25

## 2024-12-23 RX ORDER — DEXTROSE MONOHYDRATE 25 G/50ML
50 INJECTION, SOLUTION INTRAVENOUS
Refills: 0 | Status: DISCONTINUED | OUTPATIENT
Start: 2024-12-23 | End: 2024-12-25

## 2024-12-23 RX ORDER — POTASSIUM CHLORIDE 600 MG/1
20 TABLET, FILM COATED, EXTENDED RELEASE ORAL ONCE
Refills: 0 | Status: COMPLETED | OUTPATIENT
Start: 2024-12-23 | End: 2024-12-23

## 2024-12-23 RX ORDER — MILRINONE LACTATE 1 MG/ML
0.25 INJECTION, SOLUTION INTRAVENOUS
Qty: 20 | Refills: 0 | Status: DISCONTINUED | OUTPATIENT
Start: 2024-12-23 | End: 2024-12-28

## 2024-12-23 RX ORDER — INSULIN HUMAN 100 [IU]/ML
1 INJECTION, SOLUTION SUBCUTANEOUS
Qty: 50 | Refills: 0 | Status: DISCONTINUED | OUTPATIENT
Start: 2024-12-23 | End: 2024-12-25

## 2024-12-23 RX ORDER — DEXTROSE MONOHYDRATE 25 G/50ML
25 INJECTION, SOLUTION INTRAVENOUS
Refills: 0 | Status: DISCONTINUED | OUTPATIENT
Start: 2024-12-23 | End: 2024-12-25

## 2024-12-23 RX ORDER — MUPIROCIN 2 %
1 OINTMENT (GRAM) TOPICAL
Refills: 0 | Status: COMPLETED | OUTPATIENT
Start: 2024-12-23 | End: 2024-12-28

## 2024-12-23 RX ORDER — CHLORHEXIDINE GLUCONATE 1.2 MG/ML
1 RINSE ORAL DAILY
Refills: 0 | Status: DISCONTINUED | OUTPATIENT
Start: 2024-12-23 | End: 2025-01-09

## 2024-12-23 RX ORDER — ASPIRIN 81 MG
81 TABLET, DELAYED RELEASE (ENTERIC COATED) ORAL DAILY
Refills: 0 | Status: DISCONTINUED | OUTPATIENT
Start: 2024-12-23 | End: 2025-01-09

## 2024-12-23 RX ORDER — HEPARIN SODIUM 1000 [USP'U]/ML
5000 INJECTION, SOLUTION INTRAVENOUS; SUBCUTANEOUS EVERY 12 HOURS
Refills: 0 | Status: DISCONTINUED | OUTPATIENT
Start: 2024-12-23 | End: 2024-12-27

## 2024-12-23 RX ORDER — CEFAZOLIN SODIUM 1 G
2000 VIAL (EA) INJECTION EVERY 8 HOURS
Refills: 0 | Status: COMPLETED | OUTPATIENT
Start: 2024-12-23 | End: 2024-12-25

## 2024-12-23 RX ORDER — PROPOFOL 10 MG/ML
5 INJECTION, EMULSION INTRAVENOUS
Qty: 1000 | Refills: 0 | Status: DISCONTINUED | OUTPATIENT
Start: 2024-12-23 | End: 2024-12-24

## 2024-12-23 RX ORDER — CHLORHEXIDINE GLUCONATE 1.2 MG/ML
15 RINSE ORAL EVERY 12 HOURS
Refills: 0 | Status: DISCONTINUED | OUTPATIENT
Start: 2024-12-23 | End: 2024-12-23

## 2024-12-23 RX ORDER — HEPARIN SODIUM 1000 [USP'U]/ML
5000 INJECTION, SOLUTION INTRAVENOUS; SUBCUTANEOUS EVERY 8 HOURS
Refills: 0 | Status: DISCONTINUED | OUTPATIENT
Start: 2024-12-23 | End: 2024-12-23

## 2024-12-23 RX ORDER — ACETAMINOPHEN 80 MG/.8ML
1000 SOLUTION/ DROPS ORAL EVERY 6 HOURS
Refills: 0 | Status: COMPLETED | OUTPATIENT
Start: 2024-12-23 | End: 2024-12-24

## 2024-12-23 RX ORDER — DOBUTAMINE 12.5 MG/ML
2.5 INJECTION, SOLUTION, CONCENTRATE INTRAVENOUS
Qty: 500 | Refills: 0 | Status: DISCONTINUED | OUTPATIENT
Start: 2024-12-23 | End: 2024-12-27

## 2024-12-23 RX ADMIN — CHLORHEXIDINE GLUCONATE 15 MILLILITER(S): 1.2 RINSE ORAL at 06:59

## 2024-12-23 RX ADMIN — Medication 1 APPLICATION(S): at 07:00

## 2024-12-23 RX ADMIN — PANTOPRAZOLE 40 MILLIGRAM(S): 40 TABLET, DELAYED RELEASE ORAL at 06:59

## 2024-12-23 RX ADMIN — NICARDIPINE HYDROCHLORIDE 25 MG/HR: 2.5 INJECTION INTRAVENOUS at 22:27

## 2024-12-23 RX ADMIN — HEPARIN SODIUM 5.5 UNIT(S)/HR: 1000 INJECTION, SOLUTION INTRAVENOUS; SUBCUTANEOUS at 08:00

## 2024-12-23 RX ADMIN — Medication 25 MILLIGRAM(S): at 06:59

## 2024-12-23 RX ADMIN — SODIUM CHLORIDE 3 MILLILITER(S): 9 INJECTION, SOLUTION INTRAMUSCULAR; INTRAVENOUS; SUBCUTANEOUS at 07:09

## 2024-12-23 RX ADMIN — POTASSIUM CHLORIDE 100 MILLIEQUIVALENT(S): 600 TABLET, FILM COATED, EXTENDED RELEASE ORAL at 21:55

## 2024-12-23 RX ADMIN — MILRINONE LACTATE 3.38 MICROGRAM(S)/KG/MIN: 1 INJECTION, SOLUTION INTRAVENOUS at 22:27

## 2024-12-23 RX ADMIN — ACETAMINOPHEN 1000 MILLIGRAM(S): 80 SOLUTION/ DROPS ORAL at 11:57

## 2024-12-23 RX ADMIN — Medication 250 MILLILITER(S): at 23:37

## 2024-12-23 RX ADMIN — CALCIUM GLUCONATE 200 GRAM(S): 94 INJECTION, SOLUTION INTRAVENOUS at 22:51

## 2024-12-23 RX ADMIN — POTASSIUM PHOSPHATE, MONOBASIC AND POTASSIUM PHOSPHATE, DIBASIC 62.5 MILLIMOLE(S): 224; 236 INJECTION, SOLUTION INTRAVENOUS at 23:42

## 2024-12-23 RX ADMIN — CHLORHEXIDINE GLUCONATE 1 APPLICATION(S): 1.2 RINSE ORAL at 06:00

## 2024-12-23 RX ADMIN — Medication 250 MILLILITER(S): at 22:28

## 2024-12-23 RX ADMIN — Medication 2000 MILLIGRAM(S): at 00:41

## 2024-12-23 NOTE — PROGRESS NOTE ADULT - ATTENDING COMMENTS
hyponatremia almost resolved s/p NS-- suggesting volume depletion though ur lytes and hx suggest may be due to low osmolar intake   follow na post op-- use isotonic solutions bobo-op

## 2024-12-23 NOTE — PRE-ANESTHESIA EVALUATION ADULT - NSANTHPMHFT_GEN_ALL_CORE
58 year old Kyrgyz speaking female with history of HTN, HLD (not on statin 2/2 elevated LFT's), Anemia, NSTEMI 2011 (no PCI), ? TIA in 2011 (no deficits), pAfib (on Eliquis), HFpEF (EF 55-60%), gallstones, presents today for AVR, MYESHA Phan with Dr. Gray on 12/23. Pt denies CP, SOB, cough, fever, NVD on arrival to hospital.      Allergies:  	No Known Allergies:     Home Medications:   * Patient Currently Takes Medications as of 22-Dec-2024 14:43 documented in Structured Notes  · 	Lasix 20 mg oral tablet: Last Dose Taken:  , 1 tab(s) orally once a day  · 	pantoprazole 40 mg oral delayed release tablet: Last Dose Taken:  , 1 tab(s) orally once a day  · 	Coreg 3.125 mg oral tablet: Last Dose Taken:  , 1 tab(s) orally 2 times a day  · 	latanoprost 0.005% ophthalmic solution: Last Dose Taken:  , 1 drop(s) in each eye once a day  · 	Eliquis 5 mg oral tablet: Last Dose Taken: 19-Dec-2024 , 1 tab(s) orally 2 times a day  · 	Entresto 24 mg-26 mg oral tablet: Last Dose Taken:  , 1 tab(s) orally 2 times a day    PAST MEDICAL HISTORY:  Anemia     GERD (gastroesophageal reflux disease)     HLD (hyperlipidemia)     HTN (hypertension)     MI (myocardial infarction)     Paroxysmal atrial fibrillation     Severe aortic regurgitation.     PAST SURGICAL HISTORY:  Cyst of left eyelid Cyst of the left eye removed in the past month, pt still currently taking opthalmic medications.    Social History:      TTE 7/27/23: LVEF normal, LA severely dilated, RA mildly dilated, severe AR, mild MR/TR. 58 year old Mohawk speaking female with history of HTN, HLD (not on statin 2/2 elevated LFT's), Anemia, NSTEMI 2011 (no PCI), ? TIA in 2011 (no deficits), pAfib (on Eliquis), HFpEF (EF 55-60%), gallstones, presents today for AVR, MYESHA Phan with Dr. Gray on 12/23.     Pt denies CP, SOB, cough, fever, NVD on arrival to hospital.      Allergies:  	No Known Allergies:     Home Medications:   * Patient Currently Takes Medications as of 22-Dec-2024 14:43 documented in Structured Notes  · 	Lasix 20 mg oral tablet: Last Dose Taken:  , 1 tab(s) orally once a day  · 	pantoprazole 40 mg oral delayed release tablet: Last Dose Taken:  , 1 tab(s) orally once a day  · 	Coreg 3.125 mg oral tablet: Last Dose Taken:  , 1 tab(s) orally 2 times a day  · 	latanoprost 0.005% ophthalmic solution: Last Dose Taken:  , 1 drop(s) in each eye once a day  · 	Eliquis 5 mg oral tablet: Last Dose Taken: 19-Dec-2024 , 1 tab(s) orally 2 times a day  · 	Entresto 24 mg-26 mg oral tablet: Last Dose Taken:  , 1 tab(s) orally 2 times a day    PAST MEDICAL HISTORY:  Anemia     GERD (gastroesophageal reflux disease)     HLD (hyperlipidemia)     HTN (hypertension)     MI (myocardial infarction)     Paroxysmal atrial fibrillation     Severe aortic regurgitation.     PAST SURGICAL HISTORY:  Cyst of left eyelid Cyst of the left eye removed in the past month, pt still currently taking opthalmic medications.    Social History:      TTE 7/27/23: LVEF normal, LA severely dilated, RA mildly dilated, severe AR, mild MR/TR.

## 2024-12-23 NOTE — BRIEF OPERATIVE NOTE - NSICDXBRIEFPOSTOP_GEN_ALL_CORE_FT
POST-OP DIAGNOSIS:  Aortic insufficiency 23-Dec-2024 19:44:00  Tricia Henao  Aortic aneurysm 23-Dec-2024 19:44:06  Tricia Henao

## 2024-12-23 NOTE — PROGRESS NOTE ADULT - SUBJECTIVE AND OBJECTIVE BOX
CTICU  CRITICAL  CARE  attending     Hand off received 					   Pertinent clinical, laboratory, radiographic, hemodynamic, echocardiographic, respiratory data, microbiologic data and chart were reviewed and analyzed frequently throughout the course of the day and night        58 year old Pashto speaking female with history of HTN, HLD, H/O statin intolerance (not on statin 2/2 elevated LFT's), Anemia, NSTEMI 2011 (no PCI), ? TIA in 2011 (no deficits), paroxysmal Afib (on Eliquis), HFpEF (EF 55-60%), gallstones.  She was evaluated for SOB.  ECHO: Severe AI  LHC: Non obstructive CAD.    S/P AVR, [facility] [place], LAAO with Dr. Gray on 12/23        FAMILY HISTORY:  PAST MEDICAL & SURGICAL HISTORY:  HTN (hypertension)  HLD (hyperlipidemia)  GERD (gastroesophageal reflux disease)  Anemia  MI (myocardial infarction)  Paroxysmal atrial fibrillation  Severe aortic regurgitation  Cyst of left eyelid  Cyst of the left eye removed in the past month, pt still currently taking opthalmic medications            14 system review was unremarkable    Vital signs, hemodynamic and respiratory parameters were reviewed from the bedside nursing flow sheet.  ICU Vital Signs Last 24 Hrs  T(C): 36.1 (23 Dec 2024 22:18), Max: 36.7 (23 Dec 2024 09:49)  T(F): 97 (23 Dec 2024 22:18), Max: 98 (23 Dec 2024 09:49)  HR: 92 (23 Dec 2024 23:00) (60 - 94)  BP: 137/64 (23 Dec 2024 12:12) (137/64 - 168/63)  BP(mean): 92 (23 Dec 2024 12:12) (90 - 92)  ABP: 101/62 (23 Dec 2024 23:00) (82/53 - 174/107)  ABP(mean): 75 (23 Dec 2024 23:00) (66 - 131)  RR: 12 (23 Dec 2024 23:00) (12 - 20)  SpO2: 100% (23 Dec 2024 23:00) (99% - 100%)    O2 Parameters below as of 23 Dec 2024 23:00  Patient On (Oxygen Delivery Method): ventilator    O2 Concentration (%): 40      Adult Advanced Hemodynamics Last 24 Hrs  CVP(mm Hg): 17 (23 Dec 2024 23:00) (8 - 18)  CVP(cm H2O): --  CO: 3.4 (23 Dec 2024 23:00) (2.6 - 3.5)  CI: 2.4 (23 Dec 2024 23:00) (1.8 - 2.5)  PA: 37/18 (23 Dec 2024 23:00) (31/18 - 37/18)  PA(mean): 25 (23 Dec 2024 23:00) (22 - 26)  PCWP: --  SVR: 1363 (23 Dec 2024 23:00) (1363 - 1966)  SVRI: 1930 (23 Dec 2024 23:00) (1930 - 2840)  PVR: --  PVRI: --, ABG - ( 23 Dec 2024 22:25 )  pH, Arterial: 7.34  pH, Blood: x     /  pCO2: 41    /  pO2: 194   / HCO3: 22    / Base Excess: -3.5  /  SaO2: 99.5              Mode: AC/ CMV (Assist Control/ Continuous Mandatory Ventilation)  RR (machine): 12  TV (machine): 350  FiO2: 50  PEEP: 5  ITime: 1  MAP: 8.2  PIP: 19    Intake and output was reviewed and the fluid balance was calculated  Daily Height in cm: 149.86 (23 Dec 2024 12:12)    Daily   I&O's Summary    22 Dec 2024 07:01  -  23 Dec 2024 07:00  --------------------------------------------------------  IN: 788 mL / OUT: 650 mL / NET: 138 mL    23 Dec 2024 07:01  -  23 Dec 2024 23:21  --------------------------------------------------------  IN: 107.6 mL / OUT: 1900 mL / NET: -1792.4 mL        All lines and drain sites were assessed      Neuro: Follows commands. Moves all 4 extremities.  Neck: No JVD.  CVS: S1, S2, No S3.  Lungs: Good air entry bilaterally.  Abd: Soft. No tenderness. + Bowel sounds.  Vascular: + DP/PT.  Extremities: No edema.  Lymphatic: Normal.  Skin: No abnormalities.      labs  CBC Full  -  ( 23 Dec 2024 22:10 )  WBC Count : 8.42 K/uL  RBC Count : 3.70 M/uL  Hemoglobin : 11.7 g/dL  Hematocrit : 35.2 %  Platelet Count - Automated : 116 K/uL  Mean Cell Volume : 95.1 fl  Mean Cell Hemoglobin : 31.6 pg  Mean Cell Hemoglobin Concentration : 33.2 g/dL  Auto Neutrophil # : 6.79 K/uL  Auto Lymphocyte # : 0.76 K/uL  Auto Monocyte # : 0.83 K/uL  Auto Eosinophil # : 0.00 K/uL  Auto Basophil # : 0.01 K/uL  Auto Neutrophil % : 80.6 %  Auto Lymphocyte % : 9.0 %  Auto Monocyte % : 9.9 %  Auto Eosinophil % : 0.0 %  Auto Basophil % : 0.1 %    12-23    136  |  102  |  8   ----------------------------<  221[H]  3.4[L]   |  23  |  0.54    Ca    8.2[L]      23 Dec 2024 22:10  Phos  2.2     12-23  Mg     2.5     12-23    TPro  5.9[L]  /  Alb  3.6  /  TBili  1.8[H]  /  DBili  x   /  AST  107[H]  /  ALT  17  /  AlkPhos  67  12-23    PT/INR - ( 23 Dec 2024 22:10 )   PT: 11.8 sec;   INR: 1.03          PTT - ( 23 Dec 2024 22:10 )  PTT:57.3 sec  The current medications were reviewed   MEDICATIONS  (STANDING):  acetaminophen   IVPB .. 1000 milliGRAM(s) IV Intermittent every 6 hours  albumin human  5% IVPB 250 milliLiter(s) IV Intermittent every 1 hour  ascorbic acid 500 milliGRAM(s) Oral two times a day  aspirin enteric coated 81 milliGRAM(s) Oral daily  ceFAZolin   IVPB 2000 milliGRAM(s) IV Intermittent every 8 hours  chlorhexidine 0.12% Liquid 15 milliLiter(s) Oral Mucosa every 12 hours  chlorhexidine 2% Cloths 1 Application(s) Topical daily  dextrose 50% Injectable 50 milliLiter(s) IV Push every 15 minutes  dextrose 50% Injectable 25 milliLiter(s) IV Push every 15 minutes  DOBUTamine Infusion 3 MICROgram(s)/kG/Min (4.05 mL/Hr) IV Continuous <Continuous>  gabapentin 100 milliGRAM(s) Oral every 8 hours  heparin   Injectable 5000 Unit(s) SubCutaneous every 12 hours  insulin regular Infusion 1 Unit(s)/Hr (1 mL/Hr) IV Continuous <Continuous>  milrinone Infusion 0.25 MICROgram(s)/kG/Min (3.38 mL/Hr) IV Continuous <Continuous>  mupirocin 2% Ointment 1 Application(s) Both Nostrils two times a day  niCARdipine Infusion 5 mG/Hr (25 mL/Hr) IV Continuous <Continuous>  norepinephrine Infusion 0.05 MICROgram(s)/kG/Min (4.22 mL/Hr) IV Continuous <Continuous>  pantoprazole  Injectable 40 milliGRAM(s) IV Push daily  potassium chloride  20 mEq/100 mL IVPB 20 milliEquivalent(s) IV Intermittent every 2 hours  potassium phosphate IVPB 15 milliMole(s) IV Intermittent once  propofol Infusion 5 MICROgram(s)/kG/Min (1.35 mL/Hr) IV Continuous <Continuous>  sodium chloride 0.9%. 1000 milliLiter(s) (10 mL/Hr) IV Continuous <Continuous>  sodium phosphate 15 milliMole(s)/250 mL IVPB 15 milliMole(s) IV Intermittent once    MEDICATIONS  (PRN):  fentaNYL    Injectable 25 MICROGram(s) IV Push every 4 hours PRN Breakthrough Pain        59 year old  Female admitted with AI  S/P AVR  S/P Replacement of ascending aorta  Hyperglycemia  Hypokalemia   Hypophosphatemia   Hemodynamically stable.  Good oxygenation.  Fair urine out put.        My plan includes :  OPTIMIZE Glycemic control   Correct hypokalemia  WEAN to Extubate.  Decrease IV dobutamine as tolerated.  Statin Rx  Close hemodynamic monitoring   Monitor for arrhythmias and monitor parameters for organ perfusion  Monitor neurologic status  Monitor renal function.  Head of the bed should remain elevated to 45 deg .   Chest PT and IS will be encouraged  Monitor adequacy of oxygenation   Nutritional goals will be met using po eventually , ensure adequate caloric intake and monitor the same  Stress ulcer and VTE prophylaxis will be achieved    Electrolytes have been repleted as necessary and wound care has been carried out. Pain control has been achieved.   Aggressive physical therapy and early mobility and ambulation goals will be met   The family was updated about the course and plan  CRITICAL CARE TIME SPENT in evaluation and management, reassessments, review and interpretation of labs and x-rays, hemodynamic management, formulating a plan and coordinating care: ___30____ MIN.  Time does not include procedural time.  CTICU ATTENDING     					    Alexander Noble MD

## 2024-12-23 NOTE — BRIEF OPERATIVE NOTE - NSICDXBRIEFPREOP_GEN_ALL_CORE_FT
PRE-OP DIAGNOSIS:  Aortic insufficiency 23-Dec-2024 19:43:43  Tricia Henao  Aortic aneurysm 23-Dec-2024 19:43:50  Tricia Henao

## 2024-12-23 NOTE — PROGRESS NOTE ADULT - ASSESSMENT
59-year-old F with PMH of reported hyponatremia, HTN, HFpEF admitted for elective AVR, [place] procedure, LAAO on 12/23.  Nephrology consulted for hyponatremia.    1–Asymptomatic chronic hypoosmolar hyponatremia: Not at high risk of ODS, resolved  Patient reports low-sodium diet and has been told that she had low sodium in the past  When consulted (12/22 at 12 PM): SNa 126, SCr 0.8, Emmanuel 59, Uosm 181  Patient was given sodium chloride 0.9% bolus 500 mL  Most recent labs (12/23 at 5 AM): SNa 134, SCr 0.6  –Encourage p.o. intake  – BMP daily    Thank you for consulting Nephrology. We will continue to follow the patient closely and provide recommendations as needed.    Beba Hannah MD  PGY-4 Nephrology Fellow

## 2024-12-23 NOTE — BRIEF OPERATIVE NOTE - NSICDXBRIEFPROCEDURE_GEN_ALL_CORE_FT
PROCEDURES:  Replacement, aortic valve and ascending aorta 23-Dec-2024 19:43:24   23mm bio   26mm graft Tricia Henao

## 2024-12-23 NOTE — PROGRESS NOTE ADULT - SUBJECTIVE AND OBJECTIVE BOX
NEPHROLOGY PROGRESS NOTE:    Interval history:  No overnight events. Patient seen and examined at bedside. No active complaints.     Vitals:  T(F): 98 (24 @ 09:49), Max: 98 (24 @ 09:49)  HR: 60 (24 @ 12:12)  BP: 137/64 (24 @ 12:12)  RR: 18 (24 @ 12:12)  SpO2: 99% (24 @ 12:12)  Wt(kg): --     @ 07:01  -   07:00  --------------------------------------------------------  IN: 788 mL / OUT: 650 mL / NET: 138 mL     @ 07:01  -   @ 12:38  --------------------------------------------------------  IN: 22 mL / OUT: 575 mL / NET: -553 mL      Height (cm): 149.9 ( @ 12:12)  Weight (kg): 45 ( @ 12:12)  BMI (kg/m2): 20 ( 12:12)  BSA (m2): 1.37 (:12)    PE:  General: Not in acute distress, well-nourished  Neck: No visible mass, No JVD noted  Chest: CTAP b/l, no use of accessory respiratory muscles  Heart: RRR, S1/S2 wnl, no MRG  Abdomen: Soft, nontender, nondistended,  no hepatosplenomegaly  Extremities: No clubbing, cyanosis or edema  Neuro:  Alert, no apparent focal deficits, answer questions appropriately      Pertinent labs & Imagin-23    134[L]  |  102  |  21  ----------------------------<  104[H]  4.2   |  23  |  0.69    Ca    8.8      23 Dec 2024 05:30  Mg     1.8         TPro  8.0  /  Alb  4.5  /  TBili  0.4  /  DBili      /  AST  49[H]  /  ALT  15  /  AlkPhos  104                            12.9   3.68  )-----------( 186      ( 23 Dec 2024 05:30 )             38.9       Urine Studies:  Creatinine Trend: 0.69<--, 0.79<--, 0.89<--  Urinalysis Basic - ( 23 Dec 2024 05:30 )    Color:  / Appearance:  / SG:  / pH:   Gluc: 104 mg/dL / Ketone:   / Bili:  / Urobili:    Blood:  / Protein:  / Nitrite:    Leuk Esterase:  / RBC:  / WBC    Sq Epi:  / Non Sq Epi:  / Bacteria:       Sodium, Random Urine: 59 mmol/L ( @ 13:14)  Creatinine, Random Urine: 8 mg/dL ( @ 13:14)  Protein/Creatinine Ratio Calculation: Unable to calculate ( @ 13:14)  Osmolality, Random Urine: 181 mosm/kg ( @ 13:14)  Potassium, Random Urine: 12 mmol/L ( @ 13:14)      MEDICATIONS  (STANDING):  heparin  Infusion 550 Unit(s)/Hr (5.5 mL/Hr) IV Continuous <Continuous>  latanoprost 0.005% Ophthalmic Solution 1 Drop(s) Both EYES at bedtime  metoprolol tartrate 25 milliGRAM(s) Oral two times a day  mupirocin 2% Ointment 1 Application(s) Both Nostrils two times a day  pantoprazole    Tablet 40 milliGRAM(s) Oral before breakfast  sodium chloride 0.9% lock flush 3 milliLiter(s) IV Push every 8 hours    MEDICATIONS  (PRN):  acetaminophen     Tablet .. 650 milliGRAM(s) Oral every 6 hours PRN Mild Pain (1 - 3)

## 2024-12-23 NOTE — BRIEF OPERATIVE NOTE - COMMENTS
I was the first assistant for this case including but not limited to aortic valve replacement and ascending aorta replacement

## 2024-12-24 ENCOUNTER — RESULT REVIEW (OUTPATIENT)
Age: 59
End: 2024-12-24

## 2024-12-24 LAB
ADD ON TEST-SPECIMEN IN LAB: SIGNIFICANT CHANGE UP
ALBUMIN SERPL ELPH-MCNC: 3.5 G/DL — SIGNIFICANT CHANGE UP (ref 3.3–5)
ALBUMIN SERPL ELPH-MCNC: 3.7 G/DL — SIGNIFICANT CHANGE UP (ref 3.3–5)
ALBUMIN SERPL ELPH-MCNC: 3.9 G/DL — SIGNIFICANT CHANGE UP (ref 3.3–5)
ALBUMIN SERPL ELPH-MCNC: 4 G/DL — SIGNIFICANT CHANGE UP (ref 3.3–5)
ALBUMIN SERPL ELPH-MCNC: 4.1 G/DL — SIGNIFICANT CHANGE UP (ref 3.3–5)
ALP SERPL-CCNC: 43 U/L — SIGNIFICANT CHANGE UP (ref 40–120)
ALP SERPL-CCNC: 44 U/L — SIGNIFICANT CHANGE UP (ref 40–120)
ALP SERPL-CCNC: 45 U/L — SIGNIFICANT CHANGE UP (ref 40–120)
ALP SERPL-CCNC: 46 U/L — SIGNIFICANT CHANGE UP (ref 40–120)
ALP SERPL-CCNC: 54 U/L — SIGNIFICANT CHANGE UP (ref 40–120)
ALT FLD-CCNC: 11 U/L — SIGNIFICANT CHANGE UP (ref 10–45)
ALT FLD-CCNC: 11 U/L — SIGNIFICANT CHANGE UP (ref 10–45)
ALT FLD-CCNC: 13 U/L — SIGNIFICANT CHANGE UP (ref 10–45)
ALT FLD-CCNC: 9 U/L — LOW (ref 10–45)
ALT FLD-CCNC: 9 U/L — LOW (ref 10–45)
ANION GAP SERPL CALC-SCNC: 11 MMOL/L — SIGNIFICANT CHANGE UP (ref 5–17)
ANION GAP SERPL CALC-SCNC: 12 MMOL/L — SIGNIFICANT CHANGE UP (ref 5–17)
APPEARANCE UR: CLEAR — SIGNIFICANT CHANGE UP
APTT BLD: 29.7 SEC — SIGNIFICANT CHANGE UP (ref 24.5–35.6)
APTT BLD: 29.8 SEC — SIGNIFICANT CHANGE UP (ref 24.5–35.6)
APTT BLD: 29.9 SEC — SIGNIFICANT CHANGE UP (ref 24.5–35.6)
APTT BLD: 38.3 SEC — HIGH (ref 24.5–35.6)
APTT BLD: 40.8 SEC — HIGH (ref 24.5–35.6)
AST SERPL-CCNC: 100 U/L — HIGH (ref 10–40)
AST SERPL-CCNC: 75 U/L — HIGH (ref 10–40)
AST SERPL-CCNC: 80 U/L — HIGH (ref 10–40)
AST SERPL-CCNC: 84 U/L — HIGH (ref 10–40)
AST SERPL-CCNC: 87 U/L — HIGH (ref 10–40)
B-OH-BUTYR SERPL-SCNC: 0.1 MMOL/L — SIGNIFICANT CHANGE UP
BASE EXCESS BLDV CALC-SCNC: -1.5 MMOL/L — SIGNIFICANT CHANGE UP (ref -2–3)
BASE EXCESS BLDV CALC-SCNC: -2.9 MMOL/L — LOW (ref -2–3)
BASOPHILS # BLD AUTO: 0.01 K/UL — SIGNIFICANT CHANGE UP (ref 0–0.2)
BASOPHILS # BLD AUTO: 0.02 K/UL — SIGNIFICANT CHANGE UP (ref 0–0.2)
BASOPHILS NFR BLD AUTO: 0.1 % — SIGNIFICANT CHANGE UP (ref 0–2)
BASOPHILS NFR BLD AUTO: 0.2 % — SIGNIFICANT CHANGE UP (ref 0–2)
BILIRUB SERPL-MCNC: 0.6 MG/DL — SIGNIFICANT CHANGE UP (ref 0.2–1.2)
BILIRUB SERPL-MCNC: 0.8 MG/DL — SIGNIFICANT CHANGE UP (ref 0.2–1.2)
BILIRUB SERPL-MCNC: 1 MG/DL — SIGNIFICANT CHANGE UP (ref 0.2–1.2)
BILIRUB UR-MCNC: NEGATIVE — SIGNIFICANT CHANGE UP
BUN SERPL-MCNC: 10 MG/DL — SIGNIFICANT CHANGE UP (ref 7–23)
BUN SERPL-MCNC: 8 MG/DL — SIGNIFICANT CHANGE UP (ref 7–23)
BUN SERPL-MCNC: 9 MG/DL — SIGNIFICANT CHANGE UP (ref 7–23)
CALCIUM SERPL-MCNC: 8.3 MG/DL — LOW (ref 8.4–10.5)
CALCIUM SERPL-MCNC: 8.4 MG/DL — SIGNIFICANT CHANGE UP (ref 8.4–10.5)
CALCIUM SERPL-MCNC: 8.6 MG/DL — SIGNIFICANT CHANGE UP (ref 8.4–10.5)
CALCIUM SERPL-MCNC: 8.8 MG/DL — SIGNIFICANT CHANGE UP (ref 8.4–10.5)
CALCIUM SERPL-MCNC: 8.8 MG/DL — SIGNIFICANT CHANGE UP (ref 8.4–10.5)
CHLORIDE SERPL-SCNC: 103 MMOL/L — SIGNIFICANT CHANGE UP (ref 96–108)
CHLORIDE SERPL-SCNC: 104 MMOL/L — SIGNIFICANT CHANGE UP (ref 96–108)
CHLORIDE SERPL-SCNC: 105 MMOL/L — SIGNIFICANT CHANGE UP (ref 96–108)
CHLORIDE SERPL-SCNC: 106 MMOL/L — SIGNIFICANT CHANGE UP (ref 96–108)
CHLORIDE SERPL-SCNC: 107 MMOL/L — SIGNIFICANT CHANGE UP (ref 96–108)
CO2 BLDV-SCNC: 23 MMOL/L — SIGNIFICANT CHANGE UP (ref 22–26)
CO2 BLDV-SCNC: 24 MMOL/L — SIGNIFICANT CHANGE UP (ref 22–26)
CO2 SERPL-SCNC: 20 MMOL/L — LOW (ref 22–31)
CO2 SERPL-SCNC: 21 MMOL/L — LOW (ref 22–31)
CO2 SERPL-SCNC: 21 MMOL/L — LOW (ref 22–31)
CO2 SERPL-SCNC: 22 MMOL/L — SIGNIFICANT CHANGE UP (ref 22–31)
CO2 SERPL-SCNC: 22 MMOL/L — SIGNIFICANT CHANGE UP (ref 22–31)
COLOR SPEC: YELLOW — SIGNIFICANT CHANGE UP
CREAT SERPL-MCNC: 0.56 MG/DL — SIGNIFICANT CHANGE UP (ref 0.5–1.3)
CREAT SERPL-MCNC: 0.57 MG/DL — SIGNIFICANT CHANGE UP (ref 0.5–1.3)
CREAT SERPL-MCNC: 0.6 MG/DL — SIGNIFICANT CHANGE UP (ref 0.5–1.3)
CREAT SERPL-MCNC: 0.61 MG/DL — SIGNIFICANT CHANGE UP (ref 0.5–1.3)
CREAT SERPL-MCNC: 0.66 MG/DL — SIGNIFICANT CHANGE UP (ref 0.5–1.3)
DIFF PNL FLD: ABNORMAL
EGFR: 100 ML/MIN/1.73M2 — SIGNIFICANT CHANGE UP
EGFR: 102 ML/MIN/1.73M2 — SIGNIFICANT CHANGE UP
EGFR: 103 ML/MIN/1.73M2 — SIGNIFICANT CHANGE UP
EGFR: 104 ML/MIN/1.73M2 — SIGNIFICANT CHANGE UP
EGFR: 104 ML/MIN/1.73M2 — SIGNIFICANT CHANGE UP
EOSINOPHIL # BLD AUTO: 0 K/UL — SIGNIFICANT CHANGE UP (ref 0–0.5)
EOSINOPHIL NFR BLD AUTO: 0 % — SIGNIFICANT CHANGE UP (ref 0–6)
GAS PNL BLDA: SIGNIFICANT CHANGE UP
GAS PNL BLDV: SIGNIFICANT CHANGE UP
GLUCOSE SERPL-MCNC: 128 MG/DL — HIGH (ref 70–99)
GLUCOSE SERPL-MCNC: 130 MG/DL — HIGH (ref 70–99)
GLUCOSE SERPL-MCNC: 136 MG/DL — HIGH (ref 70–99)
GLUCOSE SERPL-MCNC: 153 MG/DL — HIGH (ref 70–99)
GLUCOSE SERPL-MCNC: 236 MG/DL — HIGH (ref 70–99)
GLUCOSE UR QL: 100 MG/DL
HCO3 BLDV-SCNC: 22 MMOL/L — SIGNIFICANT CHANGE UP (ref 22–29)
HCO3 BLDV-SCNC: 23 MMOL/L — SIGNIFICANT CHANGE UP (ref 22–29)
HCT VFR BLD CALC: 26.7 % — LOW (ref 34.5–45)
HCT VFR BLD CALC: 28 % — LOW (ref 34.5–45)
HCT VFR BLD CALC: 28.4 % — LOW (ref 34.5–45)
HCT VFR BLD CALC: 28.6 % — LOW (ref 34.5–45)
HCT VFR BLD CALC: 30.7 % — LOW (ref 34.5–45)
HGB BLD-MCNC: 10.1 G/DL — LOW (ref 11.5–15.5)
HGB BLD-MCNC: 9 G/DL — LOW (ref 11.5–15.5)
HGB BLD-MCNC: 9.4 G/DL — LOW (ref 11.5–15.5)
HGB BLD-MCNC: 9.6 G/DL — LOW (ref 11.5–15.5)
HGB BLD-MCNC: 9.6 G/DL — LOW (ref 11.5–15.5)
IMM GRANULOCYTES NFR BLD AUTO: 0.2 % — SIGNIFICANT CHANGE UP (ref 0–0.9)
IMM GRANULOCYTES NFR BLD AUTO: 0.3 % — SIGNIFICANT CHANGE UP (ref 0–0.9)
IMM GRANULOCYTES NFR BLD AUTO: 0.3 % — SIGNIFICANT CHANGE UP (ref 0–0.9)
INR BLD: 1.08 — SIGNIFICANT CHANGE UP (ref 0.85–1.16)
INR BLD: 1.13 — SIGNIFICANT CHANGE UP (ref 0.85–1.16)
INR BLD: 1.22 — HIGH (ref 0.85–1.16)
INR BLD: 1.27 — HIGH (ref 0.85–1.16)
INR BLD: 1.28 — HIGH (ref 0.85–1.16)
KETONES UR-MCNC: NEGATIVE MG/DL — SIGNIFICANT CHANGE UP
LACTATE SERPL-SCNC: 1.2 MMOL/L — SIGNIFICANT CHANGE UP (ref 0.5–2)
LACTATE SERPL-SCNC: 1.7 MMOL/L — SIGNIFICANT CHANGE UP (ref 0.5–2)
LACTATE SERPL-SCNC: 2.5 MMOL/L — HIGH (ref 0.5–2)
LACTATE SERPL-SCNC: 2.9 MMOL/L — HIGH (ref 0.5–2)
LACTATE SERPL-SCNC: 3 MMOL/L — HIGH (ref 0.5–2)
LACTATE SERPL-SCNC: 5.1 MMOL/L — CRITICAL HIGH (ref 0.5–2)
LEUKOCYTE ESTERASE UR-ACNC: NEGATIVE — SIGNIFICANT CHANGE UP
LYMPHOCYTES # BLD AUTO: 0.53 K/UL — LOW (ref 1–3.3)
LYMPHOCYTES # BLD AUTO: 0.65 K/UL — LOW (ref 1–3.3)
LYMPHOCYTES # BLD AUTO: 0.86 K/UL — LOW (ref 1–3.3)
LYMPHOCYTES # BLD AUTO: 0.97 K/UL — LOW (ref 1–3.3)
LYMPHOCYTES # BLD AUTO: 0.99 K/UL — LOW (ref 1–3.3)
LYMPHOCYTES # BLD AUTO: 10.1 % — LOW (ref 13–44)
LYMPHOCYTES # BLD AUTO: 6.4 % — LOW (ref 13–44)
LYMPHOCYTES # BLD AUTO: 8.9 % — LOW (ref 13–44)
LYMPHOCYTES # BLD AUTO: 9.6 % — LOW (ref 13–44)
LYMPHOCYTES # BLD AUTO: 9.7 % — LOW (ref 13–44)
MAGNESIUM SERPL-MCNC: 1.9 MG/DL — SIGNIFICANT CHANGE UP (ref 1.6–2.6)
MAGNESIUM SERPL-MCNC: 2 MG/DL — SIGNIFICANT CHANGE UP (ref 1.6–2.6)
MAGNESIUM SERPL-MCNC: 2 MG/DL — SIGNIFICANT CHANGE UP (ref 1.6–2.6)
MAGNESIUM SERPL-MCNC: 2.4 MG/DL — SIGNIFICANT CHANGE UP (ref 1.6–2.6)
MAGNESIUM SERPL-MCNC: 2.6 MG/DL — SIGNIFICANT CHANGE UP (ref 1.6–2.6)
MCHC RBC-ENTMCNC: 31.2 PG — SIGNIFICANT CHANGE UP (ref 27–34)
MCHC RBC-ENTMCNC: 31.3 PG — SIGNIFICANT CHANGE UP (ref 27–34)
MCHC RBC-ENTMCNC: 31.5 PG — SIGNIFICANT CHANGE UP (ref 27–34)
MCHC RBC-ENTMCNC: 31.5 PG — SIGNIFICANT CHANGE UP (ref 27–34)
MCHC RBC-ENTMCNC: 31.9 PG — SIGNIFICANT CHANGE UP (ref 27–34)
MCHC RBC-ENTMCNC: 32.9 G/DL — SIGNIFICANT CHANGE UP (ref 32–36)
MCHC RBC-ENTMCNC: 33.6 G/DL — SIGNIFICANT CHANGE UP (ref 32–36)
MCHC RBC-ENTMCNC: 33.6 G/DL — SIGNIFICANT CHANGE UP (ref 32–36)
MCHC RBC-ENTMCNC: 33.7 G/DL — SIGNIFICANT CHANGE UP (ref 32–36)
MCHC RBC-ENTMCNC: 33.8 G/DL — SIGNIFICANT CHANGE UP (ref 32–36)
MCV RBC AUTO: 93.3 FL — SIGNIFICANT CHANGE UP (ref 80–100)
MCV RBC AUTO: 93.4 FL — SIGNIFICANT CHANGE UP (ref 80–100)
MCV RBC AUTO: 93.8 FL — SIGNIFICANT CHANGE UP (ref 80–100)
MCV RBC AUTO: 94.4 FL — SIGNIFICANT CHANGE UP (ref 80–100)
MCV RBC AUTO: 94.8 FL — SIGNIFICANT CHANGE UP (ref 80–100)
MONOCYTES # BLD AUTO: 0.67 K/UL — SIGNIFICANT CHANGE UP (ref 0–0.9)
MONOCYTES # BLD AUTO: 0.68 K/UL — SIGNIFICANT CHANGE UP (ref 0–0.9)
MONOCYTES # BLD AUTO: 0.74 K/UL — SIGNIFICANT CHANGE UP (ref 0–0.9)
MONOCYTES # BLD AUTO: 0.83 K/UL — SIGNIFICANT CHANGE UP (ref 0–0.9)
MONOCYTES # BLD AUTO: 0.87 K/UL — SIGNIFICANT CHANGE UP (ref 0–0.9)
MONOCYTES NFR BLD AUTO: 8.1 % — SIGNIFICANT CHANGE UP (ref 2–14)
MONOCYTES NFR BLD AUTO: 8.3 % — SIGNIFICANT CHANGE UP (ref 2–14)
MONOCYTES NFR BLD AUTO: 8.4 % — SIGNIFICANT CHANGE UP (ref 2–14)
MONOCYTES NFR BLD AUTO: 8.7 % — SIGNIFICANT CHANGE UP (ref 2–14)
MONOCYTES NFR BLD AUTO: 9.4 % — SIGNIFICANT CHANGE UP (ref 2–14)
NEUTROPHILS # BLD AUTO: 5.91 K/UL — SIGNIFICANT CHANGE UP (ref 1.8–7.4)
NEUTROPHILS # BLD AUTO: 6.89 K/UL — SIGNIFICANT CHANGE UP (ref 1.8–7.4)
NEUTROPHILS # BLD AUTO: 7.06 K/UL — SIGNIFICANT CHANGE UP (ref 1.8–7.4)
NEUTROPHILS # BLD AUTO: 8.19 K/UL — HIGH (ref 1.8–7.4)
NEUTROPHILS # BLD AUTO: 8.45 K/UL — HIGH (ref 1.8–7.4)
NEUTROPHILS NFR BLD AUTO: 80.9 % — HIGH (ref 43–77)
NEUTROPHILS NFR BLD AUTO: 81.3 % — HIGH (ref 43–77)
NEUTROPHILS NFR BLD AUTO: 81.5 % — HIGH (ref 43–77)
NEUTROPHILS NFR BLD AUTO: 81.7 % — HIGH (ref 43–77)
NEUTROPHILS NFR BLD AUTO: 85.2 % — HIGH (ref 43–77)
NITRITE UR-MCNC: NEGATIVE — SIGNIFICANT CHANGE UP
NRBC # BLD: 0 /100 WBCS — SIGNIFICANT CHANGE UP (ref 0–0)
PCO2 BLDV: 37 MMHG — LOW (ref 39–42)
PCO2 BLDV: 38 MMHG — LOW (ref 39–42)
PH BLDV: 7.37 — SIGNIFICANT CHANGE UP (ref 7.32–7.43)
PH BLDV: 7.4 — SIGNIFICANT CHANGE UP (ref 7.32–7.43)
PH UR: 5.5 — SIGNIFICANT CHANGE UP (ref 5–8)
PHOSPHATE SERPL-MCNC: 2 MG/DL — LOW (ref 2.5–4.5)
PHOSPHATE SERPL-MCNC: 2 MG/DL — LOW (ref 2.5–4.5)
PHOSPHATE SERPL-MCNC: 3.1 MG/DL — SIGNIFICANT CHANGE UP (ref 2.5–4.5)
PHOSPHATE SERPL-MCNC: 4.6 MG/DL — HIGH (ref 2.5–4.5)
PHOSPHATE SERPL-MCNC: 4.7 MG/DL — HIGH (ref 2.5–4.5)
PLATELET # BLD AUTO: 82 K/UL — LOW (ref 150–400)
PLATELET # BLD AUTO: 83 K/UL — LOW (ref 150–400)
PLATELET # BLD AUTO: 83 K/UL — LOW (ref 150–400)
PLATELET # BLD AUTO: 84 K/UL — LOW (ref 150–400)
PLATELET # BLD AUTO: 96 K/UL — LOW (ref 150–400)
PO2 BLDV: 44 MMHG — SIGNIFICANT CHANGE UP (ref 25–45)
PO2 BLDV: 46 MMHG — HIGH (ref 25–45)
POTASSIUM SERPL-MCNC: 3.8 MMOL/L — SIGNIFICANT CHANGE UP (ref 3.5–5.3)
POTASSIUM SERPL-MCNC: 4 MMOL/L — SIGNIFICANT CHANGE UP (ref 3.5–5.3)
POTASSIUM SERPL-MCNC: 4.2 MMOL/L — SIGNIFICANT CHANGE UP (ref 3.5–5.3)
POTASSIUM SERPL-MCNC: 4.5 MMOL/L — SIGNIFICANT CHANGE UP (ref 3.5–5.3)
POTASSIUM SERPL-MCNC: 5 MMOL/L — SIGNIFICANT CHANGE UP (ref 3.5–5.3)
POTASSIUM SERPL-SCNC: 3.8 MMOL/L — SIGNIFICANT CHANGE UP (ref 3.5–5.3)
POTASSIUM SERPL-SCNC: 4 MMOL/L — SIGNIFICANT CHANGE UP (ref 3.5–5.3)
POTASSIUM SERPL-SCNC: 4.2 MMOL/L — SIGNIFICANT CHANGE UP (ref 3.5–5.3)
POTASSIUM SERPL-SCNC: 4.5 MMOL/L — SIGNIFICANT CHANGE UP (ref 3.5–5.3)
POTASSIUM SERPL-SCNC: 5 MMOL/L — SIGNIFICANT CHANGE UP (ref 3.5–5.3)
PROT SERPL-MCNC: 5.4 G/DL — LOW (ref 6–8.3)
PROT SERPL-MCNC: 5.5 G/DL — LOW (ref 6–8.3)
PROT SERPL-MCNC: 5.8 G/DL — LOW (ref 6–8.3)
PROT SERPL-MCNC: 5.8 G/DL — LOW (ref 6–8.3)
PROT SERPL-MCNC: 6.1 G/DL — SIGNIFICANT CHANGE UP (ref 6–8.3)
PROT UR-MCNC: NEGATIVE MG/DL — SIGNIFICANT CHANGE UP
PROTHROM AB SERPL-ACNC: 12.4 SEC — SIGNIFICANT CHANGE UP (ref 9.9–13.4)
PROTHROM AB SERPL-ACNC: 13.2 SEC — SIGNIFICANT CHANGE UP (ref 9.9–13.4)
PROTHROM AB SERPL-ACNC: 14.3 SEC — HIGH (ref 9.9–13.4)
PROTHROM AB SERPL-ACNC: 14.8 SEC — HIGH (ref 9.9–13.4)
PROTHROM AB SERPL-ACNC: 14.9 SEC — HIGH (ref 9.9–13.4)
RBC # BLD: 2.86 M/UL — LOW (ref 3.8–5.2)
RBC # BLD: 3 M/UL — LOW (ref 3.8–5.2)
RBC # BLD: 3.01 M/UL — LOW (ref 3.8–5.2)
RBC # BLD: 3.05 M/UL — LOW (ref 3.8–5.2)
RBC # BLD: 3.24 M/UL — LOW (ref 3.8–5.2)
RBC # FLD: 14.8 % — HIGH (ref 10.3–14.5)
RBC # FLD: 15.3 % — HIGH (ref 10.3–14.5)
RBC # FLD: 15.3 % — HIGH (ref 10.3–14.5)
RBC # FLD: 15.4 % — HIGH (ref 10.3–14.5)
RBC # FLD: 15.4 % — HIGH (ref 10.3–14.5)
SAO2 % BLDV: 75.9 % — SIGNIFICANT CHANGE UP (ref 67–88)
SAO2 % BLDV: 80.4 % — SIGNIFICANT CHANGE UP (ref 67–88)
SODIUM SERPL-SCNC: 136 MMOL/L — SIGNIFICANT CHANGE UP (ref 135–145)
SODIUM SERPL-SCNC: 136 MMOL/L — SIGNIFICANT CHANGE UP (ref 135–145)
SODIUM SERPL-SCNC: 139 MMOL/L — SIGNIFICANT CHANGE UP (ref 135–145)
SODIUM SERPL-SCNC: 139 MMOL/L — SIGNIFICANT CHANGE UP (ref 135–145)
SODIUM SERPL-SCNC: 140 MMOL/L — SIGNIFICANT CHANGE UP (ref 135–145)
SP GR SPEC: 1.02 — SIGNIFICANT CHANGE UP (ref 1–1.03)
UROBILINOGEN FLD QL: 0.2 MG/DL — SIGNIFICANT CHANGE UP (ref 0.2–1)
WBC # BLD: 10.02 K/UL — SIGNIFICANT CHANGE UP (ref 3.8–10.5)
WBC # BLD: 10.36 K/UL — SIGNIFICANT CHANGE UP (ref 3.8–10.5)
WBC # BLD: 7.27 K/UL — SIGNIFICANT CHANGE UP (ref 3.8–10.5)
WBC # BLD: 8.29 K/UL — SIGNIFICANT CHANGE UP (ref 3.8–10.5)
WBC # BLD: 8.52 K/UL — SIGNIFICANT CHANGE UP (ref 3.8–10.5)
WBC # FLD AUTO: 10.02 K/UL — SIGNIFICANT CHANGE UP (ref 3.8–10.5)
WBC # FLD AUTO: 10.36 K/UL — SIGNIFICANT CHANGE UP (ref 3.8–10.5)
WBC # FLD AUTO: 7.27 K/UL — SIGNIFICANT CHANGE UP (ref 3.8–10.5)
WBC # FLD AUTO: 8.29 K/UL — SIGNIFICANT CHANGE UP (ref 3.8–10.5)
WBC # FLD AUTO: 8.52 K/UL — SIGNIFICANT CHANGE UP (ref 3.8–10.5)

## 2024-12-24 PROCEDURE — 0042T: CPT

## 2024-12-24 PROCEDURE — 70496 CT ANGIOGRAPHY HEAD: CPT | Mod: 26

## 2024-12-24 PROCEDURE — 99223 1ST HOSP IP/OBS HIGH 75: CPT

## 2024-12-24 PROCEDURE — 99292 CRITICAL CARE ADDL 30 MIN: CPT

## 2024-12-24 PROCEDURE — 71250 CT THORAX DX C-: CPT | Mod: 26

## 2024-12-24 PROCEDURE — 70498 CT ANGIOGRAPHY NECK: CPT | Mod: 26

## 2024-12-24 PROCEDURE — 93306 TTE W/DOPPLER COMPLETE: CPT | Mod: 26

## 2024-12-24 PROCEDURE — 99291 CRITICAL CARE FIRST HOUR: CPT

## 2024-12-24 PROCEDURE — 71045 X-RAY EXAM CHEST 1 VIEW: CPT | Mod: 26

## 2024-12-24 PROCEDURE — 74176 CT ABD & PELVIS W/O CONTRAST: CPT | Mod: 26

## 2024-12-24 RX ORDER — MAGNESIUM SULFATE 500 MG/ML
2 INJECTION, SOLUTION INTRAMUSCULAR; INTRAVENOUS ONCE
Refills: 0 | Status: COMPLETED | OUTPATIENT
Start: 2024-12-24 | End: 2024-12-24

## 2024-12-24 RX ORDER — KETOROLAC TROMETHAMINE 30 MG/ML
15 INJECTION INTRAMUSCULAR; INTRAVENOUS EVERY 6 HOURS
Refills: 0 | Status: DISCONTINUED | OUTPATIENT
Start: 2024-12-24 | End: 2024-12-25

## 2024-12-24 RX ORDER — SODIUM CHLORIDE 9 MG/ML
1000 INJECTION, SOLUTION INTRAVENOUS ONCE
Refills: 0 | Status: COMPLETED | OUTPATIENT
Start: 2024-12-24 | End: 2024-12-24

## 2024-12-24 RX ORDER — POTASSIUM PHOSPHATE, MONOBASIC AND POTASSIUM PHOSPHATE, DIBASIC 224; 236 MG/ML; MG/ML
30 INJECTION, SOLUTION INTRAVENOUS ONCE
Refills: 0 | Status: COMPLETED | OUTPATIENT
Start: 2024-12-24 | End: 2024-12-24

## 2024-12-24 RX ORDER — KETOROLAC TROMETHAMINE 30 MG/ML
30 INJECTION INTRAMUSCULAR; INTRAVENOUS ONCE
Refills: 0 | Status: DISCONTINUED | OUTPATIENT
Start: 2024-12-24 | End: 2024-12-24

## 2024-12-24 RX ORDER — FUROSEMIDE 20 MG
10 TABLET ORAL ONCE
Refills: 0 | Status: COMPLETED | OUTPATIENT
Start: 2024-12-24 | End: 2024-12-24

## 2024-12-24 RX ADMIN — Medication 100 MILLIGRAM(S): at 11:00

## 2024-12-24 RX ADMIN — POTASSIUM CHLORIDE 50 MILLIEQUIVALENT(S): 600 TABLET, FILM COATED, EXTENDED RELEASE ORAL at 02:30

## 2024-12-24 RX ADMIN — DOBUTAMINE 4.05 MICROGRAM(S)/KG/MIN: 12.5 INJECTION, SOLUTION, CONCENTRATE INTRAVENOUS at 07:45

## 2024-12-24 RX ADMIN — KETOROLAC TROMETHAMINE 30 MILLIGRAM(S): 30 INJECTION INTRAMUSCULAR; INTRAVENOUS at 09:53

## 2024-12-24 RX ADMIN — ACETAMINOPHEN 1000 MILLIGRAM(S): 80 SOLUTION/ DROPS ORAL at 18:32

## 2024-12-24 RX ADMIN — Medication 250 MILLILITER(S): at 04:07

## 2024-12-24 RX ADMIN — ACETAMINOPHEN 1000 MILLIGRAM(S): 80 SOLUTION/ DROPS ORAL at 00:20

## 2024-12-24 RX ADMIN — Medication 250 MILLILITER(S): at 03:18

## 2024-12-24 RX ADMIN — ACETAMINOPHEN 1000 MILLIGRAM(S): 80 SOLUTION/ DROPS ORAL at 06:40

## 2024-12-24 RX ADMIN — INSULIN HUMAN 1 UNIT(S)/HR: 100 INJECTION, SOLUTION SUBCUTANEOUS at 07:45

## 2024-12-24 RX ADMIN — SODIUM CHLORIDE 1000 MILLILITER(S): 9 INJECTION, SOLUTION INTRAVENOUS at 10:00

## 2024-12-24 RX ADMIN — CHLORHEXIDINE GLUCONATE 15 MILLILITER(S): 1.2 RINSE ORAL at 18:21

## 2024-12-24 RX ADMIN — POTASSIUM CHLORIDE 50 MILLIEQUIVALENT(S): 600 TABLET, FILM COATED, EXTENDED RELEASE ORAL at 00:03

## 2024-12-24 RX ADMIN — ACETAMINOPHEN 400 MILLIGRAM(S): 80 SOLUTION/ DROPS ORAL at 00:03

## 2024-12-24 RX ADMIN — MAGNESIUM SULFATE 25 GRAM(S): 500 INJECTION, SOLUTION INTRAMUSCULAR; INTRAVENOUS at 14:48

## 2024-12-24 RX ADMIN — Medication 100 MILLIGRAM(S): at 01:31

## 2024-12-24 RX ADMIN — POTASSIUM CHLORIDE 50 MILLIEQUIVALENT(S): 600 TABLET, FILM COATED, EXTENDED RELEASE ORAL at 00:38

## 2024-12-24 RX ADMIN — HEPARIN SODIUM 5000 UNIT(S): 1000 INJECTION, SOLUTION INTRAVENOUS; SUBCUTANEOUS at 06:18

## 2024-12-24 RX ADMIN — PANTOPRAZOLE 40 MILLIGRAM(S): 40 TABLET, DELAYED RELEASE ORAL at 13:40

## 2024-12-24 RX ADMIN — MILRINONE LACTATE 3.38 MICROGRAM(S)/KG/MIN: 1 INJECTION, SOLUTION INTRAVENOUS at 07:44

## 2024-12-24 RX ADMIN — ACETAMINOPHEN 1000 MILLIGRAM(S): 80 SOLUTION/ DROPS ORAL at 13:30

## 2024-12-24 RX ADMIN — Medication 1 APPLICATION(S): at 06:18

## 2024-12-24 RX ADMIN — NICARDIPINE HYDROCHLORIDE 25 MG/HR: 2.5 INJECTION INTRAVENOUS at 07:45

## 2024-12-24 RX ADMIN — NOREPINEPHRINE BITARTRATE 4.22 MICROGRAM(S)/KG/MIN: 1 INJECTION INTRAVENOUS at 07:45

## 2024-12-24 RX ADMIN — Medication 10 MILLIGRAM(S): at 19:11

## 2024-12-24 RX ADMIN — KETOROLAC TROMETHAMINE 15 MILLIGRAM(S): 30 INJECTION INTRAMUSCULAR; INTRAVENOUS at 16:00

## 2024-12-24 RX ADMIN — KETOROLAC TROMETHAMINE 30 MILLIGRAM(S): 30 INJECTION INTRAMUSCULAR; INTRAVENOUS at 09:30

## 2024-12-24 RX ADMIN — KETOROLAC TROMETHAMINE 15 MILLIGRAM(S): 30 INJECTION INTRAMUSCULAR; INTRAVENOUS at 15:44

## 2024-12-24 RX ADMIN — CHLORHEXIDINE GLUCONATE 15 MILLILITER(S): 1.2 RINSE ORAL at 06:18

## 2024-12-24 RX ADMIN — POTASSIUM PHOSPHATE, MONOBASIC AND POTASSIUM PHOSPHATE, DIBASIC 83.33 MILLIMOLE(S): 224; 236 INJECTION, SOLUTION INTRAVENOUS at 13:14

## 2024-12-24 RX ADMIN — Medication 1 APPLICATION(S): at 18:20

## 2024-12-24 RX ADMIN — Medication 100 MILLIGRAM(S): at 18:20

## 2024-12-24 RX ADMIN — HEPARIN SODIUM 5000 UNIT(S): 1000 INJECTION, SOLUTION INTRAVENOUS; SUBCUTANEOUS at 18:43

## 2024-12-24 RX ADMIN — ACETAMINOPHEN 400 MILLIGRAM(S): 80 SOLUTION/ DROPS ORAL at 06:18

## 2024-12-24 RX ADMIN — ACETAMINOPHEN 400 MILLIGRAM(S): 80 SOLUTION/ DROPS ORAL at 13:15

## 2024-12-24 RX ADMIN — ACETAMINOPHEN 400 MILLIGRAM(S): 80 SOLUTION/ DROPS ORAL at 18:21

## 2024-12-24 NOTE — DIETITIAN INITIAL EVALUATION ADULT - ADD RECOMMEND
1. With consideration for uptrending lactate, recommend continue to hold nutrition at this time.   >>If able to extubate within 24-48hr, recommend bedside dysphagia screen prior to initiation of PO diet. Consider DASH/TLC diet, defer consistency to team/SLP.  >>If unable to advance to PO diet, pending hemodynamic stability recommend initiate enteral nutrition support: Glucerna 1.2 at 10mL/hr and advance as tolerated to goal rate 45mL/hr x24hr to provide 1080mL total volume, 1296kcal (29kcal/kg dosing wt 45kg), 65g protein (1.44g/kg dosing wt 45kg), and 869mL free water. Defer free water flushes to team. **Recommendations made with consideration for no propofol, RD available to adjust regimen prn**  >>Hold nutrition if increase in pressor requirements, MAPs consistently trending <60 mmHg, lactic acidosis present, and/or GI intolerance is noted.   2. Monitor weight trends, labs, skin integrity, & hydration status.  3. Pain and bowel regimens per team.  4. RD remains available for dietary education/intervention prn.

## 2024-12-24 NOTE — PROGRESS NOTE ADULT - SUBJECTIVE AND OBJECTIVE BOX
CTICU  CRITICAL  CARE  attending     Hand off received 					   Pertinent clinical, laboratory, radiographic, hemodynamic, echocardiographic, respiratory data, microbiologic data and chart were reviewed and analyzed frequently throughout the course of the day  Patient seen and examined with CTS/ SH attending at bedside  Pt is a 59yr old female with PMH HTN, HLD, NSTEMI, pAfib on eliquis, HFpEF, admitted for surgical mgmt of AVR. Pt underwent Ascending aorta replacement (26mm graft), AV replacement (23mm Bio; EF nl) with cryomaze and JIM occlusion (CBP 113min, XC 84min) with Dr. Gray 24. Arrived intubated on  3, primacor .25, cardene 2.5.  increased to 5. Initial lactate 1.5. Did not tolerate CPAP overnight.       FAMILY HISTORY:  PAST MEDICAL & SURGICAL HISTORY:  HTN (hypertension)  HLD (hyperlipidemia)  GERD (gastroesophageal reflux disease)  Anemia  MI (myocardial infarction)  Paroxysmal atrial fibrillation  Severe aortic regurgitation  Cyst of left eyelid  Cyst of the left eye removed in the past month, pt still currently taking opthalmic medications        Patient is a 59y old  Female who presents with a chief complaint of AVR.      14 system review was unremarkable    Vital signs, hemodynamic and respiratory parameters were reviewed from the bedside nursing flowsheet.  ICU Vital Signs Last 24 Hrs  T(C): 36.9 (24 Dec 2024 08:59), Max: 36.9 (24 Dec 2024 08:59)  T(F): 98.4 (24 Dec 2024 08:59), Max: 98.4 (24 Dec 2024 08:59)  HR: 73 (24 Dec 2024 08:00) (60 - 94)  BP: 137/64 (23 Dec 2024 12:12) (137/64 - 137/64)  BP(mean): 92 (23 Dec 2024 12:12) (92 - 92)  ABP: 104/52 (24 Dec 2024 08:00) (82/53 - 174/107)  ABP(mean): 68 (24 Dec 2024 08:00) (66 - 131)  RR: 17 (24 Dec 2024 08:00) (12 - 25)  SpO2: 100% (24 Dec 2024 08:00) (99% - 100%)    O2 Parameters below as of 24 Dec 2024 08:00  Patient On (Oxygen Delivery Method): ventilator, SIMV    O2 Concentration (%): 40      Adult Advanced Hemodynamics Last 24 Hrs  CVP(mm Hg): 16 (24 Dec 2024 08:00) (8 - 21)  CVP(cm H2O): --  CO: 4 (24 Dec 2024 08:00) (2.6 - 4.7)  CI: 2.9 (24 Dec 2024 08:00) (1.8 - 3.4)  PA: 41/17 (24 Dec 2024 08:00) (31/18 - 45/17)  PA(mean): 25 (24 Dec 2024 08:00) (22 - 28)  PCWP: --  SVR: 1038 (24 Dec 2024 08:00) (901 - 6293)  SVRI: 1432 (24 Dec 2024 08:00) (1245 - 2840)  PVR: --  PVRI: --, ABG - ( 24 Dec 2024 08:55 )  pH, Arterial: 7.39  pH, Blood: x     /  pCO2: 30    /  pO2: 177   / HCO3: 18    / Base Excess: -5.6  /  SaO2: 99.8              Mode: AC/ CMV (Assist Control/ Continuous Mandatory Ventilation)  RR (machine): 12  TV (machine): 350  FiO2: 40  PEEP: 5  ITime: 1  MAP: 9.6  PIP: 22    Intake and output was reviewed and the fluid balance was calculated  Daily Height in cm: 149.86 (23 Dec 2024 12:12)    Daily   I&O's Summary    23 Dec 2024 07:01  -  24 Dec 2024 07:00  --------------------------------------------------------  IN: 2756.2 mL / OUT: 3350 mL / NET: -593.8 mL    24 Dec 2024 07:01  -  24 Dec 2024 09:09  --------------------------------------------------------  IN: 66.7 mL / OUT: 70 mL / NET: -3.3 mL        All lines and drain sites were assessed  [facility]      POCT Blood Glucose.: 125 mg/dL (24 Dec 2024 08:42)      Neuro: opens eyes but not briskly, weak  strength bl, able to wiggle toes bl  HEENT: ett  Heart: s1 s2  Lungs:   Abdomen: soft, nt nd  Extremities: wwp    Lines:  RIj Cordis with Sargents   R radial arterial line     Tubes:  Med x 3    labs  CBC Full  -  ( 24 Dec 2024 02:39 )  WBC Count : 8.29 K/uL  RBC Count : 3.24 M/uL  Hemoglobin : 10.1 g/dL  Hematocrit : 30.7 %  Platelet Count - Automated : 96 K/uL  Mean Cell Volume : 94.8 fl  Mean Cell Hemoglobin : 31.2 pg  Mean Cell Hemoglobin Concentration : 32.9 g/dL  Auto Neutrophil # : 7.06 K/uL  Auto Lymphocyte # : 0.53 K/uL  Auto Monocyte # : 0.67 K/uL  Auto Eosinophil # : 0.00 K/uL  Auto Basophil # : 0.01 K/uL  Auto Neutrophil % : 85.2 %  Auto Lymphocyte % : 6.4 %  Auto Monocyte % : 8.1 %  Auto Eosinophil % : 0.0 %  Auto Basophil % : 0.1 %    12-    136  |  103  |  8   ----------------------------<  236[H]  5.0   |  21[L]  |  0.56    Ca    8.6      24 Dec 2024 02:39  Phos  3.1     12-24  Mg     2.0     12-24    TPro  5.8[L]  /  Alb  3.9  /  TBili  1.0  /  DBili  x   /  AST  100[H]  /  ALT  13  /  AlkPhos  54  12-24    PT/INR - ( 24 Dec 2024 02:39 )   PT: 12.4 sec;   INR: 1.08          PTT - ( 24 Dec 2024 02:39 )  PTT:40.8 sec  The current medications were reviewed   MEDICATIONS  (STANDING):  acetaminophen   IVPB .. 1000 milliGRAM(s) IV Intermittent every 6 hours  ascorbic acid 500 milliGRAM(s) Oral two times a day  aspirin enteric coated 81 milliGRAM(s) Oral daily  ceFAZolin   IVPB 2000 milliGRAM(s) IV Intermittent every 8 hours  chlorhexidine 0.12% Liquid 15 milliLiter(s) Oral Mucosa every 12 hours  chlorhexidine 2% Cloths 1 Application(s) Topical daily  dextrose 50% Injectable 50 milliLiter(s) IV Push every 15 minutes  dextrose 50% Injectable 25 milliLiter(s) IV Push every 15 minutes  DOBUTamine Infusion 3 MICROgram(s)/kG/Min (4.05 mL/Hr) IV Continuous <Continuous>  gabapentin 100 milliGRAM(s) Oral every 8 hours  heparin   Injectable 5000 Unit(s) SubCutaneous every 12 hours  insulin regular Infusion 1 Unit(s)/Hr (1 mL/Hr) IV Continuous <Continuous>  ketorolac   Injectable 30 milliGRAM(s) IV Push once  ketorolac   Injectable 15 milliGRAM(s) IV Push every 6 hours  milrinone Infusion 0.25 MICROgram(s)/kG/Min (3.38 mL/Hr) IV Continuous <Continuous>  mupirocin 2% Ointment 1 Application(s) Both Nostrils two times a day  niCARdipine Infusion 5 mG/Hr (25 mL/Hr) IV Continuous <Continuous>  norepinephrine Infusion 0.05 MICROgram(s)/kG/Min (4.22 mL/Hr) IV Continuous <Continuous>  pantoprazole  Injectable 40 milliGRAM(s) IV Push daily  polyethylene glycol 3350 17 Gram(s) Oral daily  propofol Infusion 5 MICROgram(s)/kG/Min (1.35 mL/Hr) IV Continuous <Continuous>  senna 2 Tablet(s) Oral at bedtime  sodium chloride 0.9%. 1000 milliLiter(s) (10 mL/Hr) IV Continuous <Continuous>    MEDICATIONS  (PRN):  fentaNYL    Injectable 25 MICROGram(s) IV Push every 4 hours PRN Breakthrough Pain      Assessment/Plan:  59yr old female with PMH HTN, HLD, NSTEMI, pAfib on eliquis, HFpEF, admitted for surgical mgmt of AVR.     POD1 Ascending aorta replacement (26mm graft), AV replacement (23mm Bio; EF nl) with cryomaze and JIM occlusion (CBP 113min, XC 84min, [place],  24)  Acute post operative mechanical ventilation requirement-SAT/SBT  Cardiogenic shock on  and primacor-keep  Serial CI/CO  Trend end organ perfusion markers  Lactic acidosis-trend   TTE today  Acute post operative anemia due to acute blood loss-trend H/H  Thrombocytopenia-monitor  Hyperglycemia-insulin per protocol  Aspirin  Periop abx  Replete lytes prn  Monitor CT output  GI/DVT PPX  Bowel Regimen  Pain control  Close hemodynamic, ventilatory and drain monitoring and management per post op routine  Monitor for arrhythmias and monitor parameters for organ perfusion  Beta blockade not administered due to hemodynamic instability and bradycardia  Monitor neurologic status  Head of the bed should remain elevated to 45 deg   Chest PT and IS will be encouraged  Monitor adequacy of oxygenation and ventilation and attempt to wean oxygen  Antibiotic regimen will be tailored to the clinical, laboratory and microbiologic data  Nutritional goals will be met using po eventually, ensure adequate caloric intake and monitor the same  Stress ulcer and VTE prophylaxis will be achieved    Glycemic control is satisfactory  Electrolytes have been repleted as necessary and wound care has been carried out   Pain control has been achieved.   Aggressive physical therapy and early mobility and ambulation goals will be met   The family was updated about the course and plan.    CRITICAL CARE TIME personally provided by me  in evaluation and management, reassessments, review and interpretation of labs and x-rays, ventilator and hemodynamic management, formulating a plan and coordinating care: ___140____ MIN.  Time does not include procedural time.    CTICU ATTENDING     					  Parish Malave MD

## 2024-12-24 NOTE — DIETITIAN INITIAL EVALUATION ADULT - PERTINENT LABORATORY DATA
12-24    136  |  103  |  8   ----------------------------<  236[H]  5.0   |  21[L]  |  0.56    Ca    8.6      24 Dec 2024 02:39  Phos  3.1     12-24  Mg     2.0     12-24    TPro  5.8[L]  /  Alb  3.9  /  TBili  1.0  /  DBili  x   /  AST  100[H]  /  ALT  13  /  AlkPhos  54  12-24  POCT Blood Glucose.: 125 mg/dL (12-24-24 @ 08:42)  A1C with Estimated Average Glucose Result: 6.2 % (12-22-24 @ 12:07)  A1C with Estimated Average Glucose Result: 6.1 % (06-11-24 @ 08:13)

## 2024-12-24 NOTE — PROVIDER CONTACT NOTE (CHANGE IN STATUS NOTIFICATION) - SITUATION
JEAN Collier performed initial assessment at start of shift, found there to be a change in neurological status compared to what RN received in report.

## 2024-12-24 NOTE — PROVIDER CONTACT NOTE (CHANGE IN STATUS NOTIFICATION) - BACKGROUND
pt post-op day 1 from AVR/aortic root replacement, still intubated, has been off sedation since the previous evening. lactate was trending up.

## 2024-12-24 NOTE — DIETITIAN INITIAL EVALUATION ADULT - PERTINENT MEDS FT
MEDICATIONS  (STANDING):  acetaminophen   IVPB .. 1000 milliGRAM(s) IV Intermittent every 6 hours  ascorbic acid 500 milliGRAM(s) Oral two times a day  aspirin enteric coated 81 milliGRAM(s) Oral daily  ceFAZolin   IVPB 2000 milliGRAM(s) IV Intermittent every 8 hours  chlorhexidine 0.12% Liquid 15 milliLiter(s) Oral Mucosa every 12 hours  chlorhexidine 2% Cloths 1 Application(s) Topical daily  dextrose 50% Injectable 50 milliLiter(s) IV Push every 15 minutes  dextrose 50% Injectable 25 milliLiter(s) IV Push every 15 minutes  DOBUTamine Infusion 3 MICROgram(s)/kG/Min (4.05 mL/Hr) IV Continuous <Continuous>  gabapentin 100 milliGRAM(s) Oral every 8 hours  heparin   Injectable 5000 Unit(s) SubCutaneous every 12 hours  insulin regular Infusion 1 Unit(s)/Hr (1 mL/Hr) IV Continuous <Continuous>  ketorolac   Injectable 30 milliGRAM(s) IV Push once  ketorolac   Injectable 15 milliGRAM(s) IV Push every 6 hours  milrinone Infusion 0.25 MICROgram(s)/kG/Min (3.38 mL/Hr) IV Continuous <Continuous>  mupirocin 2% Ointment 1 Application(s) Both Nostrils two times a day  niCARdipine Infusion 5 mG/Hr (25 mL/Hr) IV Continuous <Continuous>  norepinephrine Infusion 0.05 MICROgram(s)/kG/Min (4.22 mL/Hr) IV Continuous <Continuous>  pantoprazole  Injectable 40 milliGRAM(s) IV Push daily  polyethylene glycol 3350 17 Gram(s) Oral daily  propofol Infusion 5 MICROgram(s)/kG/Min (1.35 mL/Hr) IV Continuous <Continuous>  senna 2 Tablet(s) Oral at bedtime  sodium chloride 0.9%. 1000 milliLiter(s) (10 mL/Hr) IV Continuous <Continuous>    MEDICATIONS  (PRN):  fentaNYL    Injectable 25 MICROGram(s) IV Push every 4 hours PRN Breakthrough Pain

## 2024-12-24 NOTE — DIETITIAN INITIAL EVALUATION ADULT - OTHER CALCULATIONS
Estimated needs based on dosing wt as ~100% IBW 98lb/44.5kg (101%). Needs adjusted for age, HF, post-op healing, and clinical status.   Defer fluids to team.

## 2024-12-24 NOTE — CONSULT NOTE ADULT - SUBJECTIVE AND OBJECTIVE BOX
**STROKE CODE CONSULT NOTE**    Last known well time: 12/23, unknown time     HPI: 59 year old Maltese speaking female with history of HTN, HLD (not on statin 2/2 elevated LFT's), Anemia, NSTEMI 2011 (no PCI), ? TIA in 2011 (no deficits), pAfib (on Eliquis), HFpEF (EF 55-60%), gallstones, admitted to [facility] for AVR, now POD 1 s/p AVR, ascending aorta replacement, and JIM appendage closure. LKW on 12/23. During morning rounds was noted to be more lethargic with left sided weakness, fluttering eyes, L eye with upward and inward gaze, R eye with intact gaze. Stroke code was called, NIHSS 3 utilizing Maltese . CTH negative, CTP negative. CTA H/N with 2 mm right MCA bifurcation aneurysm. After CT scans was noted to have fluttering eyes, with b/l UE twitching.     T(C): 37.7 (12-24-24 @ 17:18), Max: 37.7 (12-24-24 @ 17:18)  HR: 75 (12-24-24 @ 20:00) (59 - 94)  BP: --  RR: 18 (12-24-24 @ 20:00) (12 - 25)  SpO2: 100% (12-24-24 @ 20:00) (99% - 100%)    PAST MEDICAL & SURGICAL HISTORY:  HTN (hypertension)      HLD (hyperlipidemia)      GERD (gastroesophageal reflux disease)      Anemia      MI (myocardial infarction)      Paroxysmal atrial fibrillation      Severe aortic regurgitation      Cyst of left eyelid  Cyst of the left eye removed in the past month, pt still currently taking opthalmic medications          FAMILY HISTORY:      SOCIAL HISTORY:   Patient lives with *** at ***.   Smoking status:  Drinking:  Drug Use:     ROS:  Intubated     MEDICATIONS  (STANDING):  ascorbic acid 500 milliGRAM(s) Oral two times a day  aspirin enteric coated 81 milliGRAM(s) Oral daily  ceFAZolin   IVPB 2000 milliGRAM(s) IV Intermittent every 8 hours  chlorhexidine 0.12% Liquid 15 milliLiter(s) Oral Mucosa every 12 hours  chlorhexidine 2% Cloths 1 Application(s) Topical daily  dextrose 50% Injectable 50 milliLiter(s) IV Push every 15 minutes  dextrose 50% Injectable 25 milliLiter(s) IV Push every 15 minutes  DOBUTamine Infusion 3 MICROgram(s)/kG/Min (4.05 mL/Hr) IV Continuous <Continuous>  gabapentin 100 milliGRAM(s) Oral every 8 hours  heparin   Injectable 5000 Unit(s) SubCutaneous every 12 hours  insulin regular Infusion 1 Unit(s)/Hr (1 mL/Hr) IV Continuous <Continuous>  ketorolac   Injectable 15 milliGRAM(s) IV Push every 6 hours  milrinone Infusion 0.25 MICROgram(s)/kG/Min (3.38 mL/Hr) IV Continuous <Continuous>  mupirocin 2% Ointment 1 Application(s) Both Nostrils two times a day  niCARdipine Infusion 5 mG/Hr (25 mL/Hr) IV Continuous <Continuous>  pantoprazole  Injectable 40 milliGRAM(s) IV Push daily  polyethylene glycol 3350 17 Gram(s) Oral daily  senna 2 Tablet(s) Oral at bedtime  sodium chloride 0.9%. 1000 milliLiter(s) (10 mL/Hr) IV Continuous <Continuous>    MEDICATIONS  (PRN):  fentaNYL    Injectable 25 MICROGram(s) IV Push every 4 hours PRN Breakthrough Pain    Allergies    No Known Allergies    Intolerances      Vital Signs Last 24 Hrs  T(C): 37.7 (24 Dec 2024 17:18), Max: 37.7 (24 Dec 2024 17:18)  T(F): 99.9 (24 Dec 2024 17:18), Max: 99.9 (24 Dec 2024 17:18)  HR: 75 (24 Dec 2024 20:00) (59 - 94)  BP: --  BP(mean): --  RR: 18 (24 Dec 2024 20:00) (12 - 25)  SpO2: 100% (24 Dec 2024 20:00) (99% - 100%)    Parameters below as of 24 Dec 2024 20:00  Patient On (Oxygen Delivery Method): ventilator    O2 Concentration (%): 40    Physical exam:    Neurologic:  -Mental status: Intubated, off sedation, eyes open, alert, oriented to person, place, and time with choices. Following 2 step commands. Able to name pen with choices.  -Cranial nerves:   II: Visual fields are full to confrontation.  III, IV, VI: Extraocular movements are intact without nystagmus. Pupils equally round and reactive to light  V:  Facial sensation V1-V3 equal and intact   VII: Face is symmetric with normal eye closure and smile  XII: Tongue protrudes midline  Motor: Normal bulk and tone. LUE 3/5 with drift that does not hit bed. LLE 4/5 with mild drift. Right side 5/5   Sensation: Intact to light touch bilaterally. No neglect or extinction on double simultaneous testing.  Coordination: No dysmetria on finger-to-nose bilaterally    NIHSS: 3    Fingerstick Blood Glucose: CAPILLARY BLOOD GLUCOSE      POCT Blood Glucose.: 117 mg/dL (24 Dec 2024 19:17)    LABS:                        9.4    10.02 )-----------( 82       ( 24 Dec 2024 17:32 )             28.0     12-24    140  |  107  |  9   ----------------------------<  130[H]  4.5   |  22  |  0.61    Ca    8.4      24 Dec 2024 17:32  Phos  4.6     12-24  Mg     2.6     12-24    TPro  5.5[L]  /  Alb  3.7  /  TBili  0.8  /  DBili  x   /  AST  80[H]  /  ALT  11  /  AlkPhos  45  12-24    PT/INR - ( 24 Dec 2024 17:32 )   PT: 14.9 sec;   INR: 1.28          PTT - ( 24 Dec 2024 17:32 )  PTT:29.7 sec      Urinalysis Basic - ( 24 Dec 2024 17:32 )    Color: x / Appearance: x / SG: x / pH: x  Gluc: 130 mg/dL / Ketone: x  / Bili: x / Urobili: x   Blood: x / Protein: x / Nitrite: x   Leuk Esterase: x / RBC: x / WBC x   Sq Epi: x / Non Sq Epi: x / Bacteria: x    RADIOLOGY & ADDITIONAL STUDIES:    IMPRESSION:    CT HEAD:  No acute intracranial hemorrhage, mass effect or large demarcated   territorial infarction    CT PERFUSION:  Normal CT perfusion.    CTA NECK:  No evidence of significant stenosis or occlusion.    CTA HEAD:  No large large vessel occlusion or significant stenosis. 2 mm right MCA   bifurcation aneurysm.      -----------------------------------------------------------------------------------------------------------------  IV-tenecteplase (Y/N):   N                              Bolus time:    Tenecteplase Dose Verification w/ RN:  Reason IV-tenecteplase not given: recent heart surgery

## 2024-12-24 NOTE — PROVIDER CONTACT NOTE (CHANGE IN STATUS NOTIFICATION) - ASSESSMENT
pt unable to open eyes, follows commands and nods head. moves all extremities but L side is markedly weaker, when asked if pt feels weak on L side she nodded her head. when RN opened pt's eyes to assess  pupils, pt's L eye gaze was very irregular compared to the R.

## 2024-12-24 NOTE — CONSULT NOTE ADULT - NS ATTEND AMEND GEN_ALL_CORE FT
The patient is a 59 year old female (including a fib, Eliquis on hold) w multiple comorbidities s/p ascending aortic replacement, AV replacement, and JIM occlusion on 12/23 w post-op course complicated by left-sided weakness and dysconjugate gaze/confusion, now improved, not resolved. CT/CTA/CTP unrevealing. EEG pending. ASA, statin.

## 2024-12-24 NOTE — DIETITIAN INITIAL EVALUATION ADULT - NSFNSGIIOFT_GEN_A_CORE
12-23-24 @ 07:01  -  12-24-24 @ 07:00  --------------------------------------------------------  OUT:    Nasogastric/Oral tube (mL): 175 mL  Total OUT: 175 mL    Total NET: -175 mL      12-24-24 @ 07:01  -  12-24-24 @ 09:19  --------------------------------------------------------  OUT:    Nasogastric/Oral tube (mL): 25 mL  Total OUT: 25 mL    Total NET: -25 mL

## 2024-12-24 NOTE — PROGRESS NOTE ADULT - SUBJECTIVE AND OBJECTIVE BOX
CTICU  CRITICAL  CARE  attending     Hand off received 					   Pertinent clinical, laboratory, radiographic, hemodynamic, echocardiographic, respiratory data, microbiologic data and chart were reviewed and analyzed frequently throughout the course of the day and night        58 year old Chinese speaking female with history of HTN, HLD, H/O statin intolerance (not on statin 2/2 elevated LFT's), Anemia, NSTEMI 2011 (no PCI), ? TIA in 2011 (no deficits), paroxysmal Afib (on Eliquis), HFpEF (EF 55-60%), gallstones.  She was evaluated for SOB.  ECHO: Severe AI  LHC: Non obstructive CAD.    S/P AVR, [facility] [place], LAAO with Dr. Gray on 12/23        FAMILY HISTORY:  PAST MEDICAL & SURGICAL HISTORY:  HTN (hypertension)  HLD (hyperlipidemia)  GERD (gastroesophageal reflux disease)  Anemia  MI (myocardial infarction)  Paroxysmal atrial fibrillation  Severe aortic regurgitation  Cyst of left eyelid  Cyst of the left eye removed in the past month, pt still currently taking opthalmic medications          14 system review was unremarkable    Vital signs, hemodynamic and respiratory parameters were reviewed from the bedside nursing flow sheet.  ICU Vital Signs Last 24 Hrs  T(C): 37.7 (24 Dec 2024 17:18), Max: 37.7 (24 Dec 2024 17:18)  T(F): 99.9 (24 Dec 2024 17:18), Max: 99.9 (24 Dec 2024 17:18)  HR: 74 (24 Dec 2024 21:08) (59 - 93)  BP: --  BP(mean): --  ABP: 108/52 (24 Dec 2024 21:00) (93/44 - 132/62)  ABP(mean): 67 (24 Dec 2024 21:00) (60 - 83)  RR: 16 (24 Dec 2024 21:08) (12 - 25)  SpO2: 100% (24 Dec 2024 21:08) (100% - 100%)    O2 Parameters below as of 24 Dec 2024 21:08  Patient On (Oxygen Delivery Method): ventilator    O2 Concentration (%): 40      Adult Advanced Hemodynamics Last 24 Hrs  CVP(mm Hg): 11 (24 Dec 2024 21:00) (11 - 22)  CVP(cm H2O): --  CO: 3.9 (24 Dec 2024 21:00) (2.9 - 4.7)  CI: 2.8 (24 Dec 2024 21:00) (2.1 - 3.4)  PA: 35/13 (24 Dec 2024 21:00) (32/13 - 45/17)  PA(mean): 21 (24 Dec 2024 21:00) (19 - 28)  PCWP: --  SVR: 1147 (24 Dec 2024 21:00) (876 - 1460)  SVRI: 1598 (24 Dec 2024 21:00) (1239 - 2016)  PVR: --  PVRI: --, ABG - ( 24 Dec 2024 21:22 )  pH, Arterial: 7.42  pH, Blood: x     /  pCO2: 32    /  pO2: 169   / HCO3: 21    / Base Excess: -2.8  /  SaO2: 99.8              Mode: AC/ CMV (Assist Control/ Continuous Mandatory Ventilation)  RR (machine): 12  TV (machine): 350  FiO2: 40  PEEP: 5  ITime: 1  MAP: 11  PIP: 22    Intake and output was reviewed and the fluid balance was calculated  Daily     Daily   I&O's Summary    23 Dec 2024 07:01  -  24 Dec 2024 07:00  --------------------------------------------------------  IN: 2756.2 mL / OUT: 3350 mL / NET: -593.8 mL    24 Dec 2024 07:01  -  24 Dec 2024 22:14  --------------------------------------------------------  IN: 3496.6 mL / OUT: 1160 mL / NET: 2336.6 mL        All lines and drain sites were assessed      Neuro: Pupils 2-3 mm (Reactive). Spontaneous eye opening and follow simple commands. Moves all 4 extremities. Able to perform Coordination tasks.  Neck: No JVD.  CVS: S1, S2, No S3.  Lungs: Good air entry bilaterally.  Abd: Soft. No tenderness. + Bowel sounds.  Vascular: + DP/PT.  Extremities: No edema.  Lymphatic: Normal.  Skin: No abnormalities.        labs  CBC Full  -  ( 24 Dec 2024 21:02 )  WBC Count : 10.36 K/uL  RBC Count : 2.86 M/uL  Hemoglobin : 9.0 g/dL  Hematocrit : 26.7 %  Platelet Count - Automated : 83 K/uL  Mean Cell Volume : 93.4 fl  Mean Cell Hemoglobin : 31.5 pg  Mean Cell Hemoglobin Concentration : 33.7 g/dL  Auto Neutrophil # : 8.45 K/uL  Auto Lymphocyte # : 0.99 K/uL  Auto Monocyte # : 0.87 K/uL  Auto Eosinophil # : 0.00 K/uL  Auto Basophil # : 0.02 K/uL  Auto Neutrophil % : 81.5 %  Auto Lymphocyte % : 9.6 %  Auto Monocyte % : 8.4 %  Auto Eosinophil % : 0.0 %  Auto Basophil % : 0.2 %    12-24    139  |  106  |  10  ----------------------------<  128[H]  4.2   |  21[L]  |  0.66    Ca    8.3[L]      24 Dec 2024 21:02  Phos  4.7     12-24  Mg     2.4     12-24    TPro  5.4[L]  /  Alb  3.5  /  TBili  0.6  /  DBili  x   /  AST  75[H]  /  ALT  9[L]  /  AlkPhos  44  12-24    PT/INR - ( 24 Dec 2024 21:02 )   PT: 14.8 sec;   INR: 1.27          PTT - ( 24 Dec 2024 21:02 )  PTT:38.3 sec  The current medications were reviewed   MEDICATIONS  (STANDING):  ascorbic acid 500 milliGRAM(s) Oral two times a day  aspirin enteric coated 81 milliGRAM(s) Oral daily  ceFAZolin   IVPB 2000 milliGRAM(s) IV Intermittent every 8 hours  chlorhexidine 0.12% Liquid 15 milliLiter(s) Oral Mucosa every 12 hours  chlorhexidine 2% Cloths 1 Application(s) Topical daily  dextrose 50% Injectable 50 milliLiter(s) IV Push every 15 minutes  dextrose 50% Injectable 25 milliLiter(s) IV Push every 15 minutes  DOBUTamine Infusion 3 MICROgram(s)/kG/Min (4.05 mL/Hr) IV Continuous <Continuous>  gabapentin 100 milliGRAM(s) Oral every 8 hours  heparin   Injectable 5000 Unit(s) SubCutaneous every 12 hours  insulin regular Infusion 1 Unit(s)/Hr (1 mL/Hr) IV Continuous <Continuous>  ketorolac   Injectable 15 milliGRAM(s) IV Push every 6 hours  milrinone Infusion 0.25 MICROgram(s)/kG/Min (3.38 mL/Hr) IV Continuous <Continuous>  mupirocin 2% Ointment 1 Application(s) Both Nostrils two times a day  niCARdipine Infusion 5 mG/Hr (25 mL/Hr) IV Continuous <Continuous>  pantoprazole  Injectable 40 milliGRAM(s) IV Push daily  polyethylene glycol 3350 17 Gram(s) Oral daily  senna 2 Tablet(s) Oral at bedtime  sodium chloride 0.9%. 1000 milliLiter(s) (10 mL/Hr) IV Continuous <Continuous>    MEDICATIONS  (PRN):  fentaNYL    Injectable 25 MICROGram(s) IV Push every 4 hours PRN Breakthrough Pain        59 year old  Female admitted with AI  S/P AVR  S/P Replacement of ascending aorta  Postoperative course complicated by hypokalemia, hypophosphatemia, left sided weakness, mild encephalopathy.   CT HEAD:  No acute intracranial hemorrhage, mass effect or large demarcated territorial infarction  CT PERFUSION: Normal CT perfusion.  CTA NECK: No evidence of significant stenosis or occlusion.  CTA HEAD: No large  vessel occlusion or significant stenosis. 2 mm right MCA bifurcation aneurysm.        My plan includes :  Follow EEG patterns.  WEAN vent as tolerated.  High dose Statin Rx  Hold Neurontin  WEAN off milrinone and dobutamine.  Close hemodynamic, ventilatory and drain monitoring and management  Monitor for arrhythmias and monitor parameters for organ perfusion  Monitor neurologic status  Monitor renal function.  Head of the bed should remain elevated to 45 deg .   Chest PT and IS will be encouraged  Monitor adequacy of oxygenation and ventilation and attempt to wean oxygen  Nutritional goals will be met using po eventually , ensure adequate caloric intake and monitor the same  Stress ulcer and VTE prophylaxis will be achieved    Glycemic control is satisfactory  Electrolytes have been repleted as necessary and wound care has been carried out. Pain control has been achieved.   Aggressive physical therapy and early mobility and ambulation goals will be met   The family was updated about the course and plan  CRITICAL CARE TIME SPENT in evaluation and management, reassessments, review and interpretation of labs and x-rays, hemodynamic management, formulating a plan and coordinating care: ___60____ MIN.  Time does not include procedural time.  CTICU ATTENDING     					    Alexander Noble MD

## 2024-12-24 NOTE — CONSULT NOTE ADULT - ASSESSMENT
59 year old Czech speaking female with history of HTN, HLD (not on statin 2/2 elevated LFT's), Anemia, NSTEMI 2011 (no PCI), ? TIA in 2011 (no deficits), pAfib (on Eliquis), HFpEF (EF 55-60%), gallstones, admitted to [facility]R, now POD 1 s/p AVR, ascending aorta replacement, and JIM appendage closure. LKW on 12/23. During morning rounds was noted to be more lethargic with left sided weakness, fluttering eyes, L eye with upward and inward gaze, R eye with intact gaze. Stroke code was called, NIHSS 3 utilizing Czech . CTH negative, CTP negative. CTA H/N with 2 mm right MCA bifurcation aneurysm. After CT scans was noted to have fluttering eyes, with b/l UE twitching.     Impression: Left sided weakness concerning for acute infarct, especially due to hx of pafib on eliquis, now being held i/s/o recent cardiac surgery. Pt was in afib on 12/23.     Recommend  - obtain vEEG due to b/l UE twitching  - obtain MRI brain non contrast to evaluate for acute infarct   - q2 hour stroke neuro checks     Case discussed with Neurology Attending Dr. Ames

## 2024-12-24 NOTE — DIETITIAN INITIAL EVALUATION ADULT - OTHER INFO
58F with PMH of HTN, HLD (not on statin secondary to elevated LFT's), anemia, NSTEMI (2011, no PCI), ? TIA (2011, no deficits), pAfib (on Eliquis), HFpEF (EF 55-60%), and gallstones who admitted for AVR, [facility] [place], LAAO. Now status post procedure (12/23). Pending wean to extubation.    Pt seen on 9EA for assessment. Labs and medication orders reviewed. Ordered for lactated ringers bolus, sodium chloride 0.9%, regular insulin (2mL/hr), vitamin C, dobutamine (3mcg/kg/min), fentanyl (25mcg q4hr prn), milrinone (0.25mcg/kg/min), nicardipine (5mg/hr), norepinephrine (on hold), propofol (on hold). Electrolytes WNL, BUN/Cr WNL, POC blood glucose (12/23-12/24) 148-208, HgbA1c 6.2% (12/22), lactate (12/24) 5.1 <high, uptrending>. Pt intubated and sedated, vent AC/VC. MAP 67, TMax 98.4F. NPO, +OGT for output. No overt signs of wasting identified, observation limited in setting of medical equipment. No nausea/vomiting/diarrhea documented, last recorded BM 12/22 - bowel regimen ordered, advanced 12/25. Abdomen rounded, soft. No signs of pain noted. No known food allergies. No Latter day/ethnic/cultural food preferences noted. No pressure injuries or edema documented 12/24, surgical incision noted, Titus score 15. See nutrition recommendations. RD to remain available.

## 2024-12-25 LAB
ADD ON TEST-SPECIMEN IN LAB: SIGNIFICANT CHANGE UP
ALBUMIN SERPL ELPH-MCNC: 3.4 G/DL — SIGNIFICANT CHANGE UP (ref 3.3–5)
ALBUMIN SERPL ELPH-MCNC: 3.4 G/DL — SIGNIFICANT CHANGE UP (ref 3.3–5)
ALBUMIN SERPL ELPH-MCNC: 3.5 G/DL — SIGNIFICANT CHANGE UP (ref 3.3–5)
ALBUMIN SERPL ELPH-MCNC: 3.6 G/DL — SIGNIFICANT CHANGE UP (ref 3.3–5)
ALP SERPL-CCNC: 46 U/L — SIGNIFICANT CHANGE UP (ref 40–120)
ALP SERPL-CCNC: 53 U/L — SIGNIFICANT CHANGE UP (ref 40–120)
ALP SERPL-CCNC: 55 U/L — SIGNIFICANT CHANGE UP (ref 40–120)
ALP SERPL-CCNC: 78 U/L — SIGNIFICANT CHANGE UP (ref 40–120)
ALT FLD-CCNC: 5 U/L — LOW (ref 10–45)
ALT FLD-CCNC: 7 U/L — LOW (ref 10–45)
ALT FLD-CCNC: 7 U/L — LOW (ref 10–45)
ALT FLD-CCNC: 8 U/L — LOW (ref 10–45)
AMYLASE P1 CFR SERPL: 418 U/L — HIGH (ref 25–125)
ANION GAP SERPL CALC-SCNC: 10 MMOL/L — SIGNIFICANT CHANGE UP (ref 5–17)
ANION GAP SERPL CALC-SCNC: 11 MMOL/L — SIGNIFICANT CHANGE UP (ref 5–17)
ANION GAP SERPL CALC-SCNC: 9 MMOL/L — SIGNIFICANT CHANGE UP (ref 5–17)
ANION GAP SERPL CALC-SCNC: 9 MMOL/L — SIGNIFICANT CHANGE UP (ref 5–17)
APTT BLD: 30.6 SEC — SIGNIFICANT CHANGE UP (ref 24.5–35.6)
APTT BLD: 31.5 SEC — SIGNIFICANT CHANGE UP (ref 24.5–35.6)
APTT BLD: 33.5 SEC — SIGNIFICANT CHANGE UP (ref 24.5–35.6)
APTT BLD: 33.9 SEC — SIGNIFICANT CHANGE UP (ref 24.5–35.6)
AST SERPL-CCNC: 54 U/L — HIGH (ref 10–40)
AST SERPL-CCNC: 61 U/L — HIGH (ref 10–40)
AST SERPL-CCNC: 62 U/L — HIGH (ref 10–40)
AST SERPL-CCNC: 71 U/L — HIGH (ref 10–40)
BASE EXCESS BLDV CALC-SCNC: -0.1 MMOL/L — SIGNIFICANT CHANGE UP (ref -2–3)
BASE EXCESS BLDV CALC-SCNC: -0.8 MMOL/L — SIGNIFICANT CHANGE UP (ref -2–3)
BASE EXCESS BLDV CALC-SCNC: -0.8 MMOL/L — SIGNIFICANT CHANGE UP (ref -2–3)
BASE EXCESS BLDV CALC-SCNC: -1.5 MMOL/L — SIGNIFICANT CHANGE UP (ref -2–3)
BASE EXCESS BLDV CALC-SCNC: -2 MMOL/L — SIGNIFICANT CHANGE UP (ref -2–3)
BASOPHILS # BLD AUTO: 0.01 K/UL — SIGNIFICANT CHANGE UP (ref 0–0.2)
BASOPHILS # BLD AUTO: 0.01 K/UL — SIGNIFICANT CHANGE UP (ref 0–0.2)
BASOPHILS # BLD AUTO: 0.03 K/UL — SIGNIFICANT CHANGE UP (ref 0–0.2)
BASOPHILS # BLD AUTO: 0.03 K/UL — SIGNIFICANT CHANGE UP (ref 0–0.2)
BASOPHILS NFR BLD AUTO: 0.1 % — SIGNIFICANT CHANGE UP (ref 0–2)
BASOPHILS NFR BLD AUTO: 0.1 % — SIGNIFICANT CHANGE UP (ref 0–2)
BASOPHILS NFR BLD AUTO: 0.2 % — SIGNIFICANT CHANGE UP (ref 0–2)
BASOPHILS NFR BLD AUTO: 0.2 % — SIGNIFICANT CHANGE UP (ref 0–2)
BILIRUB SERPL-MCNC: 0.3 MG/DL — SIGNIFICANT CHANGE UP (ref 0.2–1.2)
BILIRUB SERPL-MCNC: 0.4 MG/DL — SIGNIFICANT CHANGE UP (ref 0.2–1.2)
BLD GP AB SCN SERPL QL: NEGATIVE — SIGNIFICANT CHANGE UP
BUN SERPL-MCNC: 11 MG/DL — SIGNIFICANT CHANGE UP (ref 7–23)
BUN SERPL-MCNC: 12 MG/DL — SIGNIFICANT CHANGE UP (ref 7–23)
BUN SERPL-MCNC: 15 MG/DL — SIGNIFICANT CHANGE UP (ref 7–23)
BUN SERPL-MCNC: 22 MG/DL — SIGNIFICANT CHANGE UP (ref 7–23)
CALCIUM SERPL-MCNC: 8 MG/DL — LOW (ref 8.4–10.5)
CALCIUM SERPL-MCNC: 8 MG/DL — LOW (ref 8.4–10.5)
CALCIUM SERPL-MCNC: 8.5 MG/DL — SIGNIFICANT CHANGE UP (ref 8.4–10.5)
CALCIUM SERPL-MCNC: 8.5 MG/DL — SIGNIFICANT CHANGE UP (ref 8.4–10.5)
CHLORIDE SERPL-SCNC: 107 MMOL/L — SIGNIFICANT CHANGE UP (ref 96–108)
CHLORIDE SERPL-SCNC: 108 MMOL/L — SIGNIFICANT CHANGE UP (ref 96–108)
CHLORIDE SERPL-SCNC: 109 MMOL/L — HIGH (ref 96–108)
CHLORIDE SERPL-SCNC: 110 MMOL/L — HIGH (ref 96–108)
CO2 BLDV-SCNC: 23 MMOL/L — SIGNIFICANT CHANGE UP (ref 22–26)
CO2 BLDV-SCNC: 24 MMOL/L — SIGNIFICANT CHANGE UP (ref 22–26)
CO2 BLDV-SCNC: 25 MMOL/L — SIGNIFICANT CHANGE UP (ref 22–26)
CO2 SERPL-SCNC: 22 MMOL/L — SIGNIFICANT CHANGE UP (ref 22–31)
CO2 SERPL-SCNC: 22 MMOL/L — SIGNIFICANT CHANGE UP (ref 22–31)
CO2 SERPL-SCNC: 23 MMOL/L — SIGNIFICANT CHANGE UP (ref 22–31)
CO2 SERPL-SCNC: 24 MMOL/L — SIGNIFICANT CHANGE UP (ref 22–31)
CREAT SERPL-MCNC: 0.69 MG/DL — SIGNIFICANT CHANGE UP (ref 0.5–1.3)
CREAT SERPL-MCNC: 0.71 MG/DL — SIGNIFICANT CHANGE UP (ref 0.5–1.3)
CREAT SERPL-MCNC: 0.81 MG/DL — SIGNIFICANT CHANGE UP (ref 0.5–1.3)
CREAT SERPL-MCNC: 0.94 MG/DL — SIGNIFICANT CHANGE UP (ref 0.5–1.3)
EGFR: 69 ML/MIN/1.73M2 — SIGNIFICANT CHANGE UP
EGFR: 83 ML/MIN/1.73M2 — SIGNIFICANT CHANGE UP
EGFR: 97 ML/MIN/1.73M2 — SIGNIFICANT CHANGE UP
EGFR: 99 ML/MIN/1.73M2 — SIGNIFICANT CHANGE UP
EOSINOPHIL # BLD AUTO: 0 K/UL — SIGNIFICANT CHANGE UP (ref 0–0.5)
EOSINOPHIL # BLD AUTO: 0.01 K/UL — SIGNIFICANT CHANGE UP (ref 0–0.5)
EOSINOPHIL NFR BLD AUTO: 0 % — SIGNIFICANT CHANGE UP (ref 0–6)
EOSINOPHIL NFR BLD AUTO: 0.1 % — SIGNIFICANT CHANGE UP (ref 0–6)
GAS PNL BLDA: SIGNIFICANT CHANGE UP
GAS PNL BLDV: SIGNIFICANT CHANGE UP
GAS PNL BLDV: SIGNIFICANT CHANGE UP
GLUCOSE SERPL-MCNC: 119 MG/DL — HIGH (ref 70–99)
GLUCOSE SERPL-MCNC: 158 MG/DL — HIGH (ref 70–99)
GLUCOSE SERPL-MCNC: 244 MG/DL — HIGH (ref 70–99)
GLUCOSE SERPL-MCNC: 97 MG/DL — SIGNIFICANT CHANGE UP (ref 70–99)
HCO3 BLDV-SCNC: 22 MMOL/L — SIGNIFICANT CHANGE UP (ref 22–29)
HCO3 BLDV-SCNC: 23 MMOL/L — SIGNIFICANT CHANGE UP (ref 22–29)
HCO3 BLDV-SCNC: 24 MMOL/L — SIGNIFICANT CHANGE UP (ref 22–29)
HCT VFR BLD CALC: 25.7 % — LOW (ref 34.5–45)
HCT VFR BLD CALC: 26.3 % — LOW (ref 34.5–45)
HCT VFR BLD CALC: 26.4 % — LOW (ref 34.5–45)
HCT VFR BLD CALC: 30.6 % — LOW (ref 34.5–45)
HGB BLD-MCNC: 10.1 G/DL — LOW (ref 11.5–15.5)
HGB BLD-MCNC: 8.5 G/DL — LOW (ref 11.5–15.5)
HGB BLD-MCNC: 8.8 G/DL — LOW (ref 11.5–15.5)
HGB BLD-MCNC: 9 G/DL — LOW (ref 11.5–15.5)
IMM GRANULOCYTES NFR BLD AUTO: 0.3 % — SIGNIFICANT CHANGE UP (ref 0–0.9)
IMM GRANULOCYTES NFR BLD AUTO: 0.6 % — SIGNIFICANT CHANGE UP (ref 0–0.9)
IMM GRANULOCYTES NFR BLD AUTO: 0.7 % — SIGNIFICANT CHANGE UP (ref 0–0.9)
IMM GRANULOCYTES NFR BLD AUTO: 0.8 % — SIGNIFICANT CHANGE UP (ref 0–0.9)
INR BLD: 1.12 — SIGNIFICANT CHANGE UP (ref 0.85–1.16)
INR BLD: 1.21 — HIGH (ref 0.85–1.16)
INR BLD: 1.23 — HIGH (ref 0.85–1.16)
INR BLD: 1.25 — HIGH (ref 0.85–1.16)
LACTATE SERPL-SCNC: 1 MMOL/L — SIGNIFICANT CHANGE UP (ref 0.5–2)
LACTATE SERPL-SCNC: 1 MMOL/L — SIGNIFICANT CHANGE UP (ref 0.5–2)
LACTATE SERPL-SCNC: 1.4 MMOL/L — SIGNIFICANT CHANGE UP (ref 0.5–2)
LACTATE SERPL-SCNC: 1.5 MMOL/L — SIGNIFICANT CHANGE UP (ref 0.5–2)
LACTATE SERPL-SCNC: 2.7 MMOL/L — HIGH (ref 0.5–2)
LIDOCAIN IGE QN: 20 U/L — SIGNIFICANT CHANGE UP (ref 7–60)
LYMPHOCYTES # BLD AUTO: 0.91 K/UL — LOW (ref 1–3.3)
LYMPHOCYTES # BLD AUTO: 0.96 K/UL — LOW (ref 1–3.3)
LYMPHOCYTES # BLD AUTO: 1.47 K/UL — SIGNIFICANT CHANGE UP (ref 1–3.3)
LYMPHOCYTES # BLD AUTO: 1.71 K/UL — SIGNIFICANT CHANGE UP (ref 1–3.3)
LYMPHOCYTES # BLD AUTO: 12.9 % — LOW (ref 13–44)
LYMPHOCYTES # BLD AUTO: 13 % — SIGNIFICANT CHANGE UP (ref 13–44)
LYMPHOCYTES # BLD AUTO: 7.2 % — LOW (ref 13–44)
LYMPHOCYTES # BLD AUTO: 7.5 % — LOW (ref 13–44)
MAGNESIUM SERPL-MCNC: 2 MG/DL — SIGNIFICANT CHANGE UP (ref 1.6–2.6)
MAGNESIUM SERPL-MCNC: 2 MG/DL — SIGNIFICANT CHANGE UP (ref 1.6–2.6)
MAGNESIUM SERPL-MCNC: 2.2 MG/DL — SIGNIFICANT CHANGE UP (ref 1.6–2.6)
MAGNESIUM SERPL-MCNC: 2.4 MG/DL — SIGNIFICANT CHANGE UP (ref 1.6–2.6)
MCHC RBC-ENTMCNC: 31.4 PG — SIGNIFICANT CHANGE UP (ref 27–34)
MCHC RBC-ENTMCNC: 31.7 PG — SIGNIFICANT CHANGE UP (ref 27–34)
MCHC RBC-ENTMCNC: 31.7 PG — SIGNIFICANT CHANGE UP (ref 27–34)
MCHC RBC-ENTMCNC: 32.1 PG — SIGNIFICANT CHANGE UP (ref 27–34)
MCHC RBC-ENTMCNC: 33 G/DL — SIGNIFICANT CHANGE UP (ref 32–36)
MCHC RBC-ENTMCNC: 33.1 G/DL — SIGNIFICANT CHANGE UP (ref 32–36)
MCHC RBC-ENTMCNC: 33.5 G/DL — SIGNIFICANT CHANGE UP (ref 32–36)
MCHC RBC-ENTMCNC: 34.1 G/DL — SIGNIFICANT CHANGE UP (ref 32–36)
MCV RBC AUTO: 93 FL — SIGNIFICANT CHANGE UP (ref 80–100)
MCV RBC AUTO: 95 FL — SIGNIFICANT CHANGE UP (ref 80–100)
MCV RBC AUTO: 95.9 FL — SIGNIFICANT CHANGE UP (ref 80–100)
MCV RBC AUTO: 96 FL — SIGNIFICANT CHANGE UP (ref 80–100)
MONOCYTES # BLD AUTO: 0.99 K/UL — HIGH (ref 0–0.9)
MONOCYTES # BLD AUTO: 1.03 K/UL — HIGH (ref 0–0.9)
MONOCYTES # BLD AUTO: 1.18 K/UL — HIGH (ref 0–0.9)
MONOCYTES # BLD AUTO: 1.5 K/UL — HIGH (ref 0–0.9)
MONOCYTES NFR BLD AUTO: 11.3 % — SIGNIFICANT CHANGE UP (ref 2–14)
MONOCYTES NFR BLD AUTO: 7.7 % — SIGNIFICANT CHANGE UP (ref 2–14)
MONOCYTES NFR BLD AUTO: 9.1 % — SIGNIFICANT CHANGE UP (ref 2–14)
MONOCYTES NFR BLD AUTO: 9.3 % — SIGNIFICANT CHANGE UP (ref 2–14)
NEUTROPHILS # BLD AUTO: 10.49 K/UL — HIGH (ref 1.8–7.4)
NEUTROPHILS # BLD AUTO: 10.79 K/UL — HIGH (ref 1.8–7.4)
NEUTROPHILS # BLD AUTO: 8.75 K/UL — HIGH (ref 1.8–7.4)
NEUTROPHILS # BLD AUTO: 9.93 K/UL — HIGH (ref 1.8–7.4)
NEUTROPHILS NFR BLD AUTO: 74.7 % — SIGNIFICANT CHANGE UP (ref 43–77)
NEUTROPHILS NFR BLD AUTO: 77.5 % — HIGH (ref 43–77)
NEUTROPHILS NFR BLD AUTO: 82.6 % — HIGH (ref 43–77)
NEUTROPHILS NFR BLD AUTO: 84.1 % — HIGH (ref 43–77)
NRBC # BLD: 0 /100 WBCS — SIGNIFICANT CHANGE UP (ref 0–0)
PCO2 BLDV: 36 MMHG — LOW (ref 39–42)
PCO2 BLDV: 36 MMHG — LOW (ref 39–42)
PCO2 BLDV: 37 MMHG — LOW (ref 39–42)
PH BLDV: 7.4 — SIGNIFICANT CHANGE UP (ref 7.32–7.43)
PH BLDV: 7.4 — SIGNIFICANT CHANGE UP (ref 7.32–7.43)
PH BLDV: 7.41 — SIGNIFICANT CHANGE UP (ref 7.32–7.43)
PH BLDV: 7.41 — SIGNIFICANT CHANGE UP (ref 7.32–7.43)
PH BLDV: 7.43 — SIGNIFICANT CHANGE UP (ref 7.32–7.43)
PHOSPHATE SERPL-MCNC: 1.6 MG/DL — LOW (ref 2.5–4.5)
PHOSPHATE SERPL-MCNC: 2.6 MG/DL — SIGNIFICANT CHANGE UP (ref 2.5–4.5)
PHOSPHATE SERPL-MCNC: 3.3 MG/DL — SIGNIFICANT CHANGE UP (ref 2.5–4.5)
PHOSPHATE SERPL-MCNC: 4.4 MG/DL — SIGNIFICANT CHANGE UP (ref 2.5–4.5)
PLATELET # BLD AUTO: 73 K/UL — LOW (ref 150–400)
PLATELET # BLD AUTO: 75 K/UL — LOW (ref 150–400)
PLATELET # BLD AUTO: 75 K/UL — LOW (ref 150–400)
PLATELET # BLD AUTO: 84 K/UL — LOW (ref 150–400)
PO2 BLDV: 36 MMHG — SIGNIFICANT CHANGE UP (ref 25–45)
PO2 BLDV: 37 MMHG — SIGNIFICANT CHANGE UP (ref 25–45)
PO2 BLDV: 37 MMHG — SIGNIFICANT CHANGE UP (ref 25–45)
PO2 BLDV: 38 MMHG — SIGNIFICANT CHANGE UP (ref 25–45)
PO2 BLDV: 46 MMHG — HIGH (ref 25–45)
POTASSIUM SERPL-MCNC: 3.6 MMOL/L — SIGNIFICANT CHANGE UP (ref 3.5–5.3)
POTASSIUM SERPL-MCNC: 3.6 MMOL/L — SIGNIFICANT CHANGE UP (ref 3.5–5.3)
POTASSIUM SERPL-MCNC: 4.1 MMOL/L — SIGNIFICANT CHANGE UP (ref 3.5–5.3)
POTASSIUM SERPL-MCNC: 4.7 MMOL/L — SIGNIFICANT CHANGE UP (ref 3.5–5.3)
POTASSIUM SERPL-SCNC: 3.6 MMOL/L — SIGNIFICANT CHANGE UP (ref 3.5–5.3)
POTASSIUM SERPL-SCNC: 3.6 MMOL/L — SIGNIFICANT CHANGE UP (ref 3.5–5.3)
POTASSIUM SERPL-SCNC: 4.1 MMOL/L — SIGNIFICANT CHANGE UP (ref 3.5–5.3)
POTASSIUM SERPL-SCNC: 4.7 MMOL/L — SIGNIFICANT CHANGE UP (ref 3.5–5.3)
PROT SERPL-MCNC: 5.4 G/DL — LOW (ref 6–8.3)
PROT SERPL-MCNC: 5.6 G/DL — LOW (ref 6–8.3)
PROT SERPL-MCNC: 5.7 G/DL — LOW (ref 6–8.3)
PROT SERPL-MCNC: 5.8 G/DL — LOW (ref 6–8.3)
PROTHROM AB SERPL-ACNC: 12.9 SEC — SIGNIFICANT CHANGE UP (ref 9.9–13.4)
PROTHROM AB SERPL-ACNC: 13.9 SEC — HIGH (ref 9.9–13.4)
PROTHROM AB SERPL-ACNC: 14.1 SEC — HIGH (ref 9.9–13.4)
PROTHROM AB SERPL-ACNC: 14.6 SEC — HIGH (ref 9.9–13.4)
RBC # BLD: 2.68 M/UL — LOW (ref 3.8–5.2)
RBC # BLD: 2.74 M/UL — LOW (ref 3.8–5.2)
RBC # BLD: 2.84 M/UL — LOW (ref 3.8–5.2)
RBC # BLD: 3.22 M/UL — LOW (ref 3.8–5.2)
RBC # FLD: 15.1 % — HIGH (ref 10.3–14.5)
RBC # FLD: 15.2 % — HIGH (ref 10.3–14.5)
RBC # FLD: 15.5 % — HIGH (ref 10.3–14.5)
RBC # FLD: 15.7 % — HIGH (ref 10.3–14.5)
RH IG SCN BLD-IMP: POSITIVE — SIGNIFICANT CHANGE UP
SAO2 % BLDV: 60.4 % — LOW (ref 67–88)
SAO2 % BLDV: 66.3 % — LOW (ref 67–88)
SAO2 % BLDV: 66.3 % — LOW (ref 67–88)
SAO2 % BLDV: 70.2 % — SIGNIFICANT CHANGE UP (ref 67–88)
SAO2 % BLDV: 78.4 % — SIGNIFICANT CHANGE UP (ref 67–88)
SODIUM SERPL-SCNC: 138 MMOL/L — SIGNIFICANT CHANGE UP (ref 135–145)
SODIUM SERPL-SCNC: 141 MMOL/L — SIGNIFICANT CHANGE UP (ref 135–145)
SODIUM SERPL-SCNC: 142 MMOL/L — SIGNIFICANT CHANGE UP (ref 135–145)
SODIUM SERPL-SCNC: 143 MMOL/L — SIGNIFICANT CHANGE UP (ref 135–145)
WBC # BLD: 11.29 K/UL — HIGH (ref 3.8–10.5)
WBC # BLD: 12.7 K/UL — HIGH (ref 3.8–10.5)
WBC # BLD: 12.83 K/UL — HIGH (ref 3.8–10.5)
WBC # BLD: 13.29 K/UL — HIGH (ref 3.8–10.5)
WBC # FLD AUTO: 11.29 K/UL — HIGH (ref 3.8–10.5)
WBC # FLD AUTO: 12.7 K/UL — HIGH (ref 3.8–10.5)
WBC # FLD AUTO: 12.83 K/UL — HIGH (ref 3.8–10.5)
WBC # FLD AUTO: 13.29 K/UL — HIGH (ref 3.8–10.5)

## 2024-12-25 PROCEDURE — 99291 CRITICAL CARE FIRST HOUR: CPT

## 2024-12-25 PROCEDURE — 99292 CRITICAL CARE ADDL 30 MIN: CPT

## 2024-12-25 PROCEDURE — 95720 EEG PHY/QHP EA INCR W/VEEG: CPT

## 2024-12-25 PROCEDURE — 71045 X-RAY EXAM CHEST 1 VIEW: CPT | Mod: 26

## 2024-12-25 RX ORDER — ACETAMINOPHEN 80 MG/.8ML
1000 SOLUTION/ DROPS ORAL EVERY 6 HOURS
Refills: 0 | Status: DISCONTINUED | OUTPATIENT
Start: 2024-12-25 | End: 2024-12-27

## 2024-12-25 RX ORDER — POTASSIUM CHLORIDE 600 MG/1
20 TABLET, FILM COATED, EXTENDED RELEASE ORAL
Refills: 0 | Status: COMPLETED | OUTPATIENT
Start: 2024-12-25 | End: 2024-12-25

## 2024-12-25 RX ORDER — INSULIN LISPRO 100/ML
VIAL (ML) SUBCUTANEOUS EVERY 6 HOURS
Refills: 0 | Status: DISCONTINUED | OUTPATIENT
Start: 2024-12-25 | End: 2024-12-27

## 2024-12-25 RX ORDER — LATANOPROST 50 UG/ML
1 SOLUTION OPHTHALMIC AT BEDTIME
Refills: 0 | Status: DISCONTINUED | OUTPATIENT
Start: 2024-12-25 | End: 2025-01-09

## 2024-12-25 RX ORDER — FUROSEMIDE 20 MG
10 TABLET ORAL ONCE
Refills: 0 | Status: COMPLETED | OUTPATIENT
Start: 2024-12-25 | End: 2024-12-25

## 2024-12-25 RX ORDER — SODIUM CHLORIDE 9 MG/ML
1000 INJECTION, SOLUTION INTRAVENOUS
Refills: 0 | Status: DISCONTINUED | OUTPATIENT
Start: 2024-12-25 | End: 2024-12-30

## 2024-12-25 RX ORDER — DEXTROSE MONOHYDRATE 25 G/50ML
25 INJECTION, SOLUTION INTRAVENOUS ONCE
Refills: 0 | Status: DISCONTINUED | OUTPATIENT
Start: 2024-12-25 | End: 2024-12-30

## 2024-12-25 RX ORDER — DEXTROSE MONOHYDRATE 25 G/50ML
12.5 INJECTION, SOLUTION INTRAVENOUS ONCE
Refills: 0 | Status: DISCONTINUED | OUTPATIENT
Start: 2024-12-25 | End: 2024-12-30

## 2024-12-25 RX ORDER — PANTOPRAZOLE 40 MG/1
40 TABLET, DELAYED RELEASE ORAL
Refills: 0 | Status: DISCONTINUED | OUTPATIENT
Start: 2024-12-25 | End: 2025-01-09

## 2024-12-25 RX ORDER — FUROSEMIDE 20 MG
20 TABLET ORAL ONCE
Refills: 0 | Status: COMPLETED | OUTPATIENT
Start: 2024-12-25 | End: 2024-12-25

## 2024-12-25 RX ORDER — KETOROLAC TROMETHAMINE 30 MG/ML
15 INJECTION INTRAMUSCULAR; INTRAVENOUS EVERY 8 HOURS
Refills: 0 | Status: DISCONTINUED | OUTPATIENT
Start: 2024-12-25 | End: 2024-12-27

## 2024-12-25 RX ORDER — GLUCAGON INJECTION, SOLUTION 0.5 MG/.1ML
1 INJECTION, SOLUTION SUBCUTANEOUS ONCE
Refills: 0 | Status: DISCONTINUED | OUTPATIENT
Start: 2024-12-25 | End: 2025-01-09

## 2024-12-25 RX ORDER — DEXTROSE MONOHYDRATE 25 G/50ML
15 INJECTION, SOLUTION INTRAVENOUS ONCE
Refills: 0 | Status: DISCONTINUED | OUTPATIENT
Start: 2024-12-25 | End: 2024-12-30

## 2024-12-25 RX ORDER — BUMETANIDE 2 MG/1
2 TABLET ORAL ONCE
Refills: 0 | Status: COMPLETED | OUTPATIENT
Start: 2024-12-25 | End: 2024-12-25

## 2024-12-25 RX ADMIN — Medication 100 MILLIGRAM(S): at 02:36

## 2024-12-25 RX ADMIN — ACETAMINOPHEN 1000 MILLIGRAM(S): 80 SOLUTION/ DROPS ORAL at 18:00

## 2024-12-25 RX ADMIN — POTASSIUM CHLORIDE 100 MILLIEQUIVALENT(S): 600 TABLET, FILM COATED, EXTENDED RELEASE ORAL at 19:12

## 2024-12-25 RX ADMIN — Medication 500 MILLIGRAM(S): at 17:14

## 2024-12-25 RX ADMIN — Medication 1 APPLICATION(S): at 05:45

## 2024-12-25 RX ADMIN — Medication 20 MILLIGRAM(S): at 17:14

## 2024-12-25 RX ADMIN — SENNOSIDES 2 TABLET(S): 8.6 TABLET, FILM COATED ORAL at 21:23

## 2024-12-25 RX ADMIN — Medication 17 GRAM(S): at 11:04

## 2024-12-25 RX ADMIN — HEPARIN SODIUM 5000 UNIT(S): 1000 INJECTION, SOLUTION INTRAVENOUS; SUBCUTANEOUS at 17:14

## 2024-12-25 RX ADMIN — ACETAMINOPHEN 1000 MILLIGRAM(S): 80 SOLUTION/ DROPS ORAL at 17:14

## 2024-12-25 RX ADMIN — Medication 100 MILLIGRAM(S): at 09:08

## 2024-12-25 RX ADMIN — HEPARIN SODIUM 5000 UNIT(S): 1000 INJECTION, SOLUTION INTRAVENOUS; SUBCUTANEOUS at 05:45

## 2024-12-25 RX ADMIN — Medication 2: at 23:37

## 2024-12-25 RX ADMIN — KETOROLAC TROMETHAMINE 15 MILLIGRAM(S): 30 INJECTION INTRAMUSCULAR; INTRAVENOUS at 03:00

## 2024-12-25 RX ADMIN — Medication 1 APPLICATION(S): at 18:17

## 2024-12-25 RX ADMIN — PANTOPRAZOLE 40 MILLIGRAM(S): 40 TABLET, DELAYED RELEASE ORAL at 11:04

## 2024-12-25 RX ADMIN — NICARDIPINE HYDROCHLORIDE 25 MG/HR: 2.5 INJECTION INTRAVENOUS at 11:53

## 2024-12-25 RX ADMIN — CHLORHEXIDINE GLUCONATE 15 MILLILITER(S): 1.2 RINSE ORAL at 05:45

## 2024-12-25 RX ADMIN — KETOROLAC TROMETHAMINE 15 MILLIGRAM(S): 30 INJECTION INTRAMUSCULAR; INTRAVENOUS at 02:36

## 2024-12-25 RX ADMIN — Medication 81 MILLIGRAM(S): at 11:04

## 2024-12-25 RX ADMIN — LATANOPROST 1 DROP(S): 50 SOLUTION OPHTHALMIC at 21:32

## 2024-12-25 RX ADMIN — Medication 20 MILLIGRAM(S): at 14:13

## 2024-12-25 RX ADMIN — ACETAMINOPHEN 1000 MILLIGRAM(S): 80 SOLUTION/ DROPS ORAL at 11:05

## 2024-12-25 RX ADMIN — BUMETANIDE 2 MILLIGRAM(S): 2 TABLET ORAL at 19:56

## 2024-12-25 RX ADMIN — Medication 4: at 17:26

## 2024-12-25 RX ADMIN — ACETAMINOPHEN 1000 MILLIGRAM(S): 80 SOLUTION/ DROPS ORAL at 11:31

## 2024-12-25 RX ADMIN — POTASSIUM CHLORIDE 100 MILLIEQUIVALENT(S): 600 TABLET, FILM COATED, EXTENDED RELEASE ORAL at 17:14

## 2024-12-25 RX ADMIN — CHLORHEXIDINE GLUCONATE 1 APPLICATION(S): 1.2 RINSE ORAL at 05:46

## 2024-12-25 RX ADMIN — Medication 10 MILLIGRAM(S): at 05:45

## 2024-12-25 NOTE — PROGRESS NOTE ADULT - SUBJECTIVE AND OBJECTIVE BOX
CTICU  CRITICAL  CARE  attending     Hand off received 					   Pertinent clinical, laboratory, radiographic, hemodynamic, echocardiographic, respiratory data, microbiologic data and chart were reviewed and analyzed frequently throughout the course of the day  Patient seen and examined with CTS/ SH attending at bedside  Pt is a 59yr old female with PMH HTN, HLD, NSTEMI, pAfib on eliquis, HFpEF, admitted for surgical mgmt of AVR. Pt underwent Ascending aorta replacement (26mm graft), AV replacement (23mm Bio; EF nl) with cryomaze and JIM occlusion (CBP 113min, XC 84min) with Dr. Gray 24. Arrived intubated on  3, primacor .25, cardene 2.5.  increased to 5. Initial lactate 1.5. Did not tolerate CPAP overnight. : Weakness in L side. Stroke Code Called. CTH and CTA/H/N with no LVO or hemorrhage or acute infarct. CTCAP also done. EEG placed in CTICU. Lactate hernandez to 5.1 but cleared. TTE with nl biventricular function, severe biatrial enlargement, no pericardial effusion. Extubated overnight. Mental status improving.     FAMILY HISTORY:  PAST MEDICAL & SURGICAL HISTORY:  HTN (hypertension)  HLD (hyperlipidemia)  GERD (gastroesophageal reflux disease)  Anemia  MI (myocardial infarction)  Paroxysmal atrial fibrillation  Severe aortic regurgitation  Cyst of left eyelid  Cyst of the left eye removed in the past month, pt still currently taking opthalmic medications        Patient is a 59y old  Female who presents with a chief complaint of AVR.      14 system review was unremarkable    Vital signs, hemodynamic and respiratory parameters were reviewed from the bedside nursing flowsheet.  ICU Vital Signs Last 24 Hrs  T(C): 36.1 (25 Dec 2024 08:54), Max: 37.7 (24 Dec 2024 17:18)  T(F): 97 (25 Dec 2024 08:54), Max: 99.9 (24 Dec 2024 17:18)  HR: 79 (25 Dec 2024 10:00) (67 - 93)  BP: --  BP(mean): --  ABP: 114/55 (25 Dec 2024 10:00) (92/48 - 128/62)  ABP(mean): 72 (25 Dec 2024 10:00) (60 - 80)  RR: 13 (25 Dec 2024 10:00) (13 - 20)  SpO2: 97% (25 Dec 2024 10:00) (97% - 100%)    O2 Parameters below as of 25 Dec 2024 10:00  Patient On (Oxygen Delivery Method): nasal cannula w/ humidification  O2 Flow (L/min): 6        Adult Advanced Hemodynamics Last 24 Hrs  CVP(mm Hg): 15 (25 Dec 2024 10:00) (11 - 18)  CVP(cm H2O): --  CO: 3.7 (25 Dec 2024 09:00) (2.9 - 4.1)  CI: 2.7 (25 Dec 2024 09:00) (2.1 - 2.9)  PA: 44/17 (25 Dec 2024 10:00) (30/14 - 44/17)  PA(mean): 26 (25 Dec 2024 10:) (19 - 28)  PCWP: --  SVR: 1188 (25 Dec 2024 09:00) (876 - 1469)  SVRI: 1450 (25 Dec 2024 09:00) (1239 - 2070)  PVR: --  PVRI: --, ABG - ( 25 Dec 2024 07:23 )  pH, Arterial: 7.46  pH, Blood: x     /  pCO2: 32    /  pO2: 104   / HCO3: 23    / Base Excess: -0.3  /  SaO2: 99.3              Mode: CPAP with PS  FiO2: 40  PEEP: 5  PS: 10  MAP: 15  PIP: 22    Intake and output was reviewed and the fluid balance was calculated  Daily     Daily   I&O's Summary    24 Dec 2024 07:  -  25 Dec 2024 07:00  --------------------------------------------------------  IN: 3857.6 mL / OUT: 2115 mL / NET: 1742.6 mL    25 Dec 2024 07:01  -  25 Dec 2024 11:06  --------------------------------------------------------  IN: 193.1 mL / OUT: 220 mL / NET: -26.9 mL        All lines and drain sites were assessed  [facility]      POCT Blood Glucose.: 98 mg/dL (25 Dec 2024 10:53)    Neuro: responding appropriately, able to  strongly, able to elevated legs off bed and hold  HEENT: mmm  Heart: s1 s2  Lungs:   Abdomen: soft, nt nd  Extremities: wwp    Lines:  RIj Cordis with [place]   R radial arterial line     Tubes:  Med x 3      labs  CBC Full  -  ( 25 Dec 2024 01:59 )  WBC Count : 11.29 K/uL  RBC Count : 2.84 M/uL  Hemoglobin : 9.0 g/dL  Hematocrit : 26.4 %  Platelet Count - Automated : 84 K/uL  Mean Cell Volume : 93.0 fl  Mean Cell Hemoglobin : 31.7 pg  Mean Cell Hemoglobin Concentration : 34.1 g/dL  Auto Neutrophil # : 8.75 K/uL  Auto Lymphocyte # : 1.47 K/uL  Auto Monocyte # : 1.03 K/uL  Auto Eosinophil # : 0.00 K/uL  Auto Basophil # : 0.01 K/uL  Auto Neutrophil % : 77.5 %  Auto Lymphocyte % : 13.0 %  Auto Monocyte % : 9.1 %  Auto Eosinophil % : 0.0 %  Auto Basophil % : 0.1 %    12    142  |  109[H]  |  11  ----------------------------<  97  4.1   |  24  |  0.71    Ca    8.5      25 Dec 2024 01:59  Phos  4.4     12-  Mg     2.4     -    TPro  5.4[L]  /  Alb  3.4  /  TBili  0.4  /  DBili  x   /  AST  71[H]  /  ALT  8[L]  /  AlkPhos  46  12-25    PT/INR - ( 25 Dec 2024 01:59 )   PT: 14.6 sec;   INR: 1.25          PTT - ( 25 Dec 2024 01:59 )  PTT:33.5 sec  The current medications were reviewed   MEDICATIONS  (STANDING):  acetaminophen     Tablet .. 1000 milliGRAM(s) Oral every 6 hours  ascorbic acid 500 milliGRAM(s) Oral two times a day  aspirin enteric coated 81 milliGRAM(s) Oral daily  bisacodyl Suppository 10 milliGRAM(s) Rectal once  chlorhexidine 2% Cloths 1 Application(s) Topical daily  dextrose 5%. 1000 milliLiter(s) (50 mL/Hr) IV Continuous <Continuous>  dextrose 5%. 1000 milliLiter(s) (100 mL/Hr) IV Continuous <Continuous>  dextrose 50% Injectable 25 Gram(s) IV Push once  dextrose 50% Injectable 12.5 Gram(s) IV Push once  dextrose 50% Injectable 25 Gram(s) IV Push once  DOBUTamine Infusion 2.5 MICROgram(s)/kG/Min (3.38 mL/Hr) IV Continuous <Continuous>  glucagon  Injectable 1 milliGRAM(s) IntraMuscular once  heparin   Injectable 5000 Unit(s) SubCutaneous every 12 hours  insulin lispro (ADMELOG) corrective regimen sliding scale   SubCutaneous every 6 hours  latanoprost 0.005% Ophthalmic Solution 1 Drop(s) Both EYES at bedtime  milrinone Infusion 0.25 MICROgram(s)/kG/Min (3.38 mL/Hr) IV Continuous <Continuous>  mupirocin 2% Ointment 1 Application(s) Both Nostrils two times a day  niCARdipine Infusion 5 mG/Hr (25 mL/Hr) IV Continuous <Continuous>  pantoprazole    Tablet 40 milliGRAM(s) Oral before breakfast  polyethylene glycol 3350 17 Gram(s) Oral daily  senna 2 Tablet(s) Oral at bedtime  sodium chloride 0.9%. 1000 milliLiter(s) (10 mL/Hr) IV Continuous <Continuous>    MEDICATIONS  (PRN):  dextrose Oral Gel 15 Gram(s) Oral once PRN Blood Glucose LESS THAN 70 milliGRAM(s)/deciliter  ketorolac   Injectable 15 milliGRAM(s) IV Push every 8 hours PRN Moderate Pain (4 - 6)      Assessment/Plan:  59yr old female with PMH HTN, HLD, NSTEMI, pAfib on eliquis, HFpEF, admitted for surgical mgmt of AVR.     POD2 Ascending aorta replacement (26mm graft), AV replacement (23mm Bio; EF nl) with cryomaze and JIM occlusion (CBP 113min, XC 84min, [place],  24)  Cardiogenic shock on  and primacor-wean as tolerated  Serial CI/CO  Trend end organ perfusion markers  Stroke Neurology following  Likely MRI later  EEG negative  Acute post operative anemia due to acute blood loss-trend H/H  Thrombocytopenia-monitor  Hyperglycemia-insulin per protocol  Aspirin  Periop abx  diet as tolerated  Replete lytes prn  Monitor CT output  GI/DVT PPX  Bowel Regimen  Pain control  OOB with PT  Close hemodynamic, ventilatory and drain monitoring and management per post op routine  Monitor for arrhythmias and monitor parameters for organ perfusion  Beta blockade not administered due to hemodynamic instability and bradycardia  Monitor neurologic status  Head of the bed should remain elevated to 45 deg   Chest PT and IS will be encouraged  Monitor adequacy of oxygenation and ventilation and attempt to wean oxygen  Antibiotic regimen will be tailored to the clinical, laboratory and microbiologic data  Nutritional goals will be met using po eventually, ensure adequate caloric intake and monitor the same  Stress ulcer and VTE prophylaxis will be achieved    Glycemic control is satisfactory  Electrolytes have been repleted as necessary and wound care has been carried out   Pain control has been achieved.   Aggressive physical therapy and early mobility and ambulation goals will be met   The family was updated about the course and plan.    CRITICAL CARE TIME personally provided by me  in evaluation and management, reassessments, review and interpretation of labs and x-rays, ventilator and hemodynamic management, formulating a plan and coordinating care: ___140____ MIN.  Time does not include procedural time.          CTICU ATTENDING     					  Parish Malave MD

## 2024-12-25 NOTE — PROGRESS NOTE ADULT - ASSESSMENT
59 year old Malay speaking female with history of HTN, HLD (not on statin 2/2 elevated LFT's), Anemia, NSTEMI 2011 (no PCI), ? TIA in 2011 (no deficits), pAfib (on Eliquis), HFpEF (EF 55-60%), gallstones, admitted to [facility] for AVR, now POD 1 s/p AVR, ascending aorta replacement, and JIM appendage closure. LKW on 12/23. During morning rounds was noted to be more lethargic with left sided weakness, fluttering eyes, L eye with upward and inward gaze, R eye with intact gaze. Stroke code was called, NIHSS 3 utilizing Malay . CTH negative, CTP negative. CTA H/N with 2 mm right MCA bifurcation aneurysm. After CT scans was noted to have fluttering eyes, with b/l UE twitching. Pt's left sided weakness has been persistent, but improved.     Impression: Left sided weakness concerning for acute infarct, especially due to hx of pafib on eliquis, now being held i/s/o recent cardiac surgery. Pt was in afib on 12/23.     Recommend  - vEEG negative  - recommend MRI brain non contrast to evaluate for acute infarct however will likely not  as pt will need to be restarted on eliquis for pafib once cleared by primary team.   - q2 hour stroke neuro checks   - stroke education     Case discussed with Neurology Attending Dr. Painter

## 2024-12-25 NOTE — EEG REPORT - NS EEG TEXT BOX
[facility] Department of Neurology  Inpatient Continuous video-Electroencephalogram  [address], [address] [place] [id number], T: [phone]    Patient Name:	JADON YOUNG    :	1965  MRN:	[id number]    Study Start Date/Time:  2024, 12:59:56 PM  Study End Date/Time:  in progress    Referred by:  Alice Ames MD    Brief Clinical History:  JADON YOUNG is a 59-year-old woman with possible stroke vs. seizure after cardiac surgery; study performed to investigate for seizures or markers of epilepsy.     Diagnosis Code:  R56.9 convulsions/seizure    Pertinent Medication:  n/a    Acquisition Details:  Electroencephalography was acquired using a minimum of 21 channels on an DST[employer] Neurology system v 9.3.1 with electrode placement according to the standard International 10-20 system following ACNS (American Clinical Neurophysiology Society) guidelines.  Anterior temporal T1 and T2 electrodes were utilized whenever possible.  The XLTEK automated spike & seizure detections were all reviewed in detail, in addition to the entire raw EEG.    Findings:    Day 1:  2024, 12:59:56 PM to next morning at 07:00 AM   Background:  continuous, with predominantly alpha and beta frequencies.  Generalized Slowing:  None  Symmetry/Focality: No persistent asymmetries of voltage or frequency.      Voltage:  Normal (20+ uV)  Organization:  Appropriate anterior-posterior gradient  Posterior Dominant Rhythm:  9 Hz symmetric, well-organized, and well-modulated  Sleep:  Symmetric, synchronous spindles and K complexes.  Variability:   Yes		Reactivity:  Yes    Spontaneous Activity:  No epileptiform discharges     Events:  •	No electrographic seizures or significant clinical events occurred during this study.  Provocations:  •	Hyperventilation: was not performed.  •	Photic stimulation: was not performed.    Daily Summary:    •	Normal EEG, awake and asleep.  •	There were no findings of active epilepsy, however this alone does not rule out the diagnosis.         Gladys Martinez MD  Attending Neurologist, [facility]     [facility] Department of Neurology  Inpatient Continuous video-Electroencephalogram  [address], [address] [place] [id number], T: [phone]    Patient Name:	JADON YOUNG    :	1965  MRN:	[id number]    Study Start Date/Time:  2024, 12:59:56 PM  Study End Date/Time:  2024, 10:42 AM    Referred by:  Alice Ames MD    Brief Clinical History:  JADON YOUNG is a 59-year-old woman with possible stroke vs. seizure after cardiac surgery; study performed to investigate for seizures or markers of epilepsy.     Diagnosis Code:  R56.9 convulsions/seizure    Pertinent Medication:  n/a    Acquisition Details:  Electroencephalography was acquired using a minimum of 21 channels on an [employer] Neurology system v 9.3.1 with electrode placement according to the standard International 10-20 system following ACNS (American Clinical Neurophysiology Society) guidelines.  Anterior temporal T1 and T2 electrodes were utilized whenever possible.  The XLTEK automated spike & seizure detections were all reviewed in detail, in addition to the entire raw EEG.    Findings:    Background:  continuous, with predominantly alpha and beta frequencies.  Generalized Slowing:  None  Symmetry/Focality: No persistent asymmetries of voltage or frequency.      Voltage:  Normal (20+ uV)  Organization:  Appropriate anterior-posterior gradient  Posterior Dominant Rhythm:  9 Hz symmetric, well-organized, and well-modulated  Sleep:  Symmetric, synchronous spindles and K complexes.  Variability:   Yes		Reactivity:  Yes    Spontaneous Activity:  No epileptiform discharges     Events:  •	No electrographic seizures or significant clinical events occurred during this study.  Provocations:  •	Hyperventilation: was not performed.  •	Photic stimulation: was not performed.    Summary and Clinical Correlation:    •	Normal EEG, awake and asleep.  •	There were no findings of active epilepsy, however this alone does not rule out the diagnosis.         Gladys Martinez MD  Attending Neurologist, [facility]

## 2024-12-25 NOTE — PROGRESS NOTE ADULT - SUBJECTIVE AND OBJECTIVE BOX
Neurology Stroke Progress Note    INTERVAL HPI/OVERNIGHT EVENTS:  No acute overnight events. Pt extubated this AM. EEG negative, was dc'ed. Patient seen and examined. Feeling well, c/o of right neck pain.     MEDICATIONS  (STANDING):  acetaminophen     Tablet .. 1000 milliGRAM(s) Oral every 6 hours  ascorbic acid 500 milliGRAM(s) Oral two times a day  aspirin enteric coated 81 milliGRAM(s) Oral daily  bisacodyl Suppository 10 milliGRAM(s) Rectal once  chlorhexidine 2% Cloths 1 Application(s) Topical daily  dextrose 5%. 1000 milliLiter(s) (50 mL/Hr) IV Continuous <Continuous>  dextrose 5%. 1000 milliLiter(s) (100 mL/Hr) IV Continuous <Continuous>  dextrose 50% Injectable 25 Gram(s) IV Push once  dextrose 50% Injectable 12.5 Gram(s) IV Push once  dextrose 50% Injectable 25 Gram(s) IV Push once  DOBUTamine Infusion 2.5 MICROgram(s)/kG/Min (3.38 mL/Hr) IV Continuous <Continuous>  furosemide   Injectable 20 milliGRAM(s) IV Push once  glucagon  Injectable 1 milliGRAM(s) IntraMuscular once  heparin   Injectable 5000 Unit(s) SubCutaneous every 12 hours  insulin lispro (ADMELOG) corrective regimen sliding scale   SubCutaneous every 6 hours  latanoprost 0.005% Ophthalmic Solution 1 Drop(s) Both EYES at bedtime  milrinone Infusion 0.25 MICROgram(s)/kG/Min (3.38 mL/Hr) IV Continuous <Continuous>  mupirocin 2% Ointment 1 Application(s) Both Nostrils two times a day  niCARdipine Infusion 5 mG/Hr (25 mL/Hr) IV Continuous <Continuous>  pantoprazole    Tablet 40 milliGRAM(s) Oral before breakfast  polyethylene glycol 3350 17 Gram(s) Oral daily  senna 2 Tablet(s) Oral at bedtime  sodium chloride 0.9%. 1000 milliLiter(s) (10 mL/Hr) IV Continuous <Continuous>    MEDICATIONS  (PRN):  dextrose Oral Gel 15 Gram(s) Oral once PRN Blood Glucose LESS THAN 70 milliGRAM(s)/deciliter  ketorolac   Injectable 15 milliGRAM(s) IV Push every 8 hours PRN Moderate Pain (4 - 6)      Allergies    No Known Allergies    Intolerances    Vital Signs Last 24 Hrs  T(C): 36.1 (25 Dec 2024 08:54), Max: 37.7 (24 Dec 2024 17:18)  T(F): 97 (25 Dec 2024 08:54), Max: 99.9 (24 Dec 2024 17:18)  HR: 81 (25 Dec 2024 14:00) (67 - 93)  BP: --  BP(mean): --  RR: 14 (25 Dec 2024 14:00) (13 - 20)  SpO2: 95% (25 Dec 2024 14:00) (94% - 100%)    Parameters below as of 25 Dec 2024 14:00  Patient On (Oxygen Delivery Method): nasal cannula w/ humidification  O2 Flow (L/min): 0.6    Physical exam:  General: No acute distress, awake and alert    Neurologic:  -Mental status: Awake, eyes open, alert to person, place, and time. Hypophonic speech likely 2/2 intubation, mild dysarthria, naming and repetition intact. Following 2 step commands.   -Cranial nerves:   II: Visual fields are full to confrontation.  III, IV, VI: Extraocular movements are intact without nystagmus. Pupils equally round and reactive to light  V:  Facial sensation V1-V3 equal and intact   VII: Face is symmetric with normal eye closure and smile  XII: Tongue protrudes midline  Motor: Normal bulk and tone. LUE 4/5 with subtle drift. LLE 4/5 with no drift. Right side 5/5   Sensation: Left sided numbness. No neglect or extinction on double simultaneous testing.  Coordination: No dysmetria on finger-to-nose bilaterally    LABS:                        8.8    12.70 )-----------( 75       ( 25 Dec 2024 10:33 )             26.3     12-25    141  |  108  |  12  ----------------------------<  119[H]  3.6   |  23  |  0.69    Ca    8.5      25 Dec 2024 10:33  Phos  3.3     12-25  Mg     2.2     12-25    TPro  5.8[L]  /  Alb  3.6  /  TBili  0.4  /  DBili  x   /  AST  61[H]  /  ALT  7[L]  /  AlkPhos  53  12-25    PT/INR - ( 25 Dec 2024 10:33 )   PT: 14.1 sec;   INR: 1.23          PTT - ( 25 Dec 2024 10:33 )  PTT:31.5 sec  Urinalysis Basic - ( 25 Dec 2024 10:33 )    Color: x / Appearance: x / SG: x / pH: x  Gluc: 119 mg/dL / Ketone: x  / Bili: x / Urobili: x   Blood: x / Protein: x / Nitrite: x   Leuk Esterase: x / RBC: x / WBC x   Sq Epi: x / Non Sq Epi: x / Bacteria: x    RADIOLOGY & ADDITIONAL TESTS:    EEG Report  Daily Summary:    •	Normal EEG, awake and asleep.  •	There were no findings of active epilepsy, however this alone does not rule out the diagnosis.

## 2024-12-26 LAB
ALBUMIN SERPL ELPH-MCNC: 3.4 G/DL — SIGNIFICANT CHANGE UP (ref 3.3–5)
ALBUMIN SERPL ELPH-MCNC: 3.5 G/DL — SIGNIFICANT CHANGE UP (ref 3.3–5)
ALBUMIN SERPL ELPH-MCNC: 3.5 G/DL — SIGNIFICANT CHANGE UP (ref 3.3–5)
ALP SERPL-CCNC: 63 U/L — SIGNIFICANT CHANGE UP (ref 40–120)
ALP SERPL-CCNC: 71 U/L — SIGNIFICANT CHANGE UP (ref 40–120)
ALP SERPL-CCNC: 72 U/L — SIGNIFICANT CHANGE UP (ref 40–120)
ALT FLD-CCNC: 6 U/L — LOW (ref 10–45)
ALT FLD-CCNC: 7 U/L — LOW (ref 10–45)
ALT FLD-CCNC: <5 U/L — LOW (ref 10–45)
ANION GAP SERPL CALC-SCNC: 10 MMOL/L — SIGNIFICANT CHANGE UP (ref 5–17)
ANION GAP SERPL CALC-SCNC: 11 MMOL/L — SIGNIFICANT CHANGE UP (ref 5–17)
ANION GAP SERPL CALC-SCNC: 11 MMOL/L — SIGNIFICANT CHANGE UP (ref 5–17)
APTT BLD: 31.3 SEC — SIGNIFICANT CHANGE UP (ref 24.5–35.6)
APTT BLD: 32.8 SEC — SIGNIFICANT CHANGE UP (ref 24.5–35.6)
APTT BLD: 35.1 SEC — SIGNIFICANT CHANGE UP (ref 24.5–35.6)
AST SERPL-CCNC: 42 U/L — HIGH (ref 10–40)
AST SERPL-CCNC: 43 U/L — HIGH (ref 10–40)
AST SERPL-CCNC: 54 U/L — HIGH (ref 10–40)
BASE EXCESS BLDV CALC-SCNC: -0.7 MMOL/L — SIGNIFICANT CHANGE UP (ref -2–3)
BASE EXCESS BLDV CALC-SCNC: -2.8 MMOL/L — LOW (ref -2–3)
BASE EXCESS BLDV CALC-SCNC: -3 MMOL/L — LOW (ref -2–3)
BASE EXCESS BLDV CALC-SCNC: -3.1 MMOL/L — LOW (ref -2–3)
BASOPHILS # BLD AUTO: 0.02 K/UL — SIGNIFICANT CHANGE UP (ref 0–0.2)
BASOPHILS NFR BLD AUTO: 0.2 % — SIGNIFICANT CHANGE UP (ref 0–2)
BILIRUB SERPL-MCNC: 0.4 MG/DL — SIGNIFICANT CHANGE UP (ref 0.2–1.2)
BILIRUB SERPL-MCNC: 0.5 MG/DL — SIGNIFICANT CHANGE UP (ref 0.2–1.2)
BILIRUB SERPL-MCNC: 0.6 MG/DL — SIGNIFICANT CHANGE UP (ref 0.2–1.2)
BUN SERPL-MCNC: 21 MG/DL — SIGNIFICANT CHANGE UP (ref 7–23)
BUN SERPL-MCNC: 21 MG/DL — SIGNIFICANT CHANGE UP (ref 7–23)
BUN SERPL-MCNC: 23 MG/DL — SIGNIFICANT CHANGE UP (ref 7–23)
CALCIUM SERPL-MCNC: 8.2 MG/DL — LOW (ref 8.4–10.5)
CALCIUM SERPL-MCNC: 8.3 MG/DL — LOW (ref 8.4–10.5)
CALCIUM SERPL-MCNC: 8.3 MG/DL — LOW (ref 8.4–10.5)
CHLORIDE SERPL-SCNC: 103 MMOL/L — SIGNIFICANT CHANGE UP (ref 96–108)
CHLORIDE SERPL-SCNC: 104 MMOL/L — SIGNIFICANT CHANGE UP (ref 96–108)
CHLORIDE SERPL-SCNC: 106 MMOL/L — SIGNIFICANT CHANGE UP (ref 96–108)
CO2 BLDV-SCNC: 22 MMOL/L — SIGNIFICANT CHANGE UP (ref 22–26)
CO2 BLDV-SCNC: 22 MMOL/L — SIGNIFICANT CHANGE UP (ref 22–26)
CO2 BLDV-SCNC: 23 MMOL/L — SIGNIFICANT CHANGE UP (ref 22–26)
CO2 BLDV-SCNC: 24 MMOL/L — SIGNIFICANT CHANGE UP (ref 22–26)
CO2 SERPL-SCNC: 20 MMOL/L — LOW (ref 22–31)
CO2 SERPL-SCNC: 21 MMOL/L — LOW (ref 22–31)
CO2 SERPL-SCNC: 22 MMOL/L — SIGNIFICANT CHANGE UP (ref 22–31)
CREAT SERPL-MCNC: 0.82 MG/DL — SIGNIFICANT CHANGE UP (ref 0.5–1.3)
CREAT SERPL-MCNC: 0.9 MG/DL — SIGNIFICANT CHANGE UP (ref 0.5–1.3)
CREAT SERPL-MCNC: 0.9 MG/DL — SIGNIFICANT CHANGE UP (ref 0.5–1.3)
EGFR: 73 ML/MIN/1.73M2 — SIGNIFICANT CHANGE UP
EGFR: 73 ML/MIN/1.73M2 — SIGNIFICANT CHANGE UP
EGFR: 82 ML/MIN/1.73M2 — SIGNIFICANT CHANGE UP
EOSINOPHIL # BLD AUTO: 0 K/UL — SIGNIFICANT CHANGE UP (ref 0–0.5)
EOSINOPHIL # BLD AUTO: 0.01 K/UL — SIGNIFICANT CHANGE UP (ref 0–0.5)
EOSINOPHIL # BLD AUTO: 0.03 K/UL — SIGNIFICANT CHANGE UP (ref 0–0.5)
EOSINOPHIL NFR BLD AUTO: 0 % — SIGNIFICANT CHANGE UP (ref 0–6)
EOSINOPHIL NFR BLD AUTO: 0.1 % — SIGNIFICANT CHANGE UP (ref 0–6)
EOSINOPHIL NFR BLD AUTO: 0.2 % — SIGNIFICANT CHANGE UP (ref 0–6)
GAS PNL BLDA: SIGNIFICANT CHANGE UP
GAS PNL BLDV: SIGNIFICANT CHANGE UP
GAS PNL BLDV: SIGNIFICANT CHANGE UP
GLUCOSE SERPL-MCNC: 138 MG/DL — HIGH (ref 70–99)
GLUCOSE SERPL-MCNC: 170 MG/DL — HIGH (ref 70–99)
GLUCOSE SERPL-MCNC: 215 MG/DL — HIGH (ref 70–99)
HCO3 BLDV-SCNC: 21 MMOL/L — LOW (ref 22–29)
HCO3 BLDV-SCNC: 21 MMOL/L — LOW (ref 22–29)
HCO3 BLDV-SCNC: 22 MMOL/L — SIGNIFICANT CHANGE UP (ref 22–29)
HCO3 BLDV-SCNC: 23 MMOL/L — SIGNIFICANT CHANGE UP (ref 22–29)
HCT VFR BLD CALC: 29.7 % — LOW (ref 34.5–45)
HCT VFR BLD CALC: 29.8 % — LOW (ref 34.5–45)
HCT VFR BLD CALC: 30 % — LOW (ref 34.5–45)
HGB BLD-MCNC: 10 G/DL — LOW (ref 11.5–15.5)
HGB BLD-MCNC: 10 G/DL — LOW (ref 11.5–15.5)
HGB BLD-MCNC: 10.1 G/DL — LOW (ref 11.5–15.5)
IMM GRANULOCYTES NFR BLD AUTO: 0.6 % — SIGNIFICANT CHANGE UP (ref 0–0.9)
IMM GRANULOCYTES NFR BLD AUTO: 0.8 % — SIGNIFICANT CHANGE UP (ref 0–0.9)
IMM GRANULOCYTES NFR BLD AUTO: 1.1 % — HIGH (ref 0–0.9)
INR BLD: 0.99 — SIGNIFICANT CHANGE UP (ref 0.85–1.16)
INR BLD: 1.03 — SIGNIFICANT CHANGE UP (ref 0.85–1.16)
INR BLD: 1.11 — SIGNIFICANT CHANGE UP (ref 0.85–1.16)
LACTATE SERPL-SCNC: 0.9 MMOL/L — SIGNIFICANT CHANGE UP (ref 0.5–2)
LACTATE SERPL-SCNC: 1.8 MMOL/L — SIGNIFICANT CHANGE UP (ref 0.5–2)
LACTATE SERPL-SCNC: 1.8 MMOL/L — SIGNIFICANT CHANGE UP (ref 0.5–2)
LACTATE SERPL-SCNC: 2 MMOL/L — SIGNIFICANT CHANGE UP (ref 0.5–2)
LYMPHOCYTES # BLD AUTO: 1.65 K/UL — SIGNIFICANT CHANGE UP (ref 1–3.3)
LYMPHOCYTES # BLD AUTO: 1.68 K/UL — SIGNIFICANT CHANGE UP (ref 1–3.3)
LYMPHOCYTES # BLD AUTO: 1.78 K/UL — SIGNIFICANT CHANGE UP (ref 1–3.3)
LYMPHOCYTES # BLD AUTO: 12.7 % — LOW (ref 13–44)
LYMPHOCYTES # BLD AUTO: 13 % — SIGNIFICANT CHANGE UP (ref 13–44)
LYMPHOCYTES # BLD AUTO: 13.8 % — SIGNIFICANT CHANGE UP (ref 13–44)
MAGNESIUM SERPL-MCNC: 1.9 MG/DL — SIGNIFICANT CHANGE UP (ref 1.6–2.6)
MAGNESIUM SERPL-MCNC: 2 MG/DL — SIGNIFICANT CHANGE UP (ref 1.6–2.6)
MAGNESIUM SERPL-MCNC: 2.7 MG/DL — HIGH (ref 1.6–2.6)
MCHC RBC-ENTMCNC: 30.9 PG — SIGNIFICANT CHANGE UP (ref 27–34)
MCHC RBC-ENTMCNC: 31.2 PG — SIGNIFICANT CHANGE UP (ref 27–34)
MCHC RBC-ENTMCNC: 31.5 PG — SIGNIFICANT CHANGE UP (ref 27–34)
MCHC RBC-ENTMCNC: 33.3 G/DL — SIGNIFICANT CHANGE UP (ref 32–36)
MCHC RBC-ENTMCNC: 33.7 G/DL — SIGNIFICANT CHANGE UP (ref 32–36)
MCHC RBC-ENTMCNC: 33.9 G/DL — SIGNIFICANT CHANGE UP (ref 32–36)
MCV RBC AUTO: 92.5 FL — SIGNIFICANT CHANGE UP (ref 80–100)
MCV RBC AUTO: 92.6 FL — SIGNIFICANT CHANGE UP (ref 80–100)
MCV RBC AUTO: 92.8 FL — SIGNIFICANT CHANGE UP (ref 80–100)
MONOCYTES # BLD AUTO: 1.04 K/UL — HIGH (ref 0–0.9)
MONOCYTES # BLD AUTO: 1.15 K/UL — HIGH (ref 0–0.9)
MONOCYTES # BLD AUTO: 1.23 K/UL — HIGH (ref 0–0.9)
MONOCYTES NFR BLD AUTO: 8.2 % — SIGNIFICANT CHANGE UP (ref 2–14)
MONOCYTES NFR BLD AUTO: 8.9 % — SIGNIFICANT CHANGE UP (ref 2–14)
MONOCYTES NFR BLD AUTO: 9.3 % — SIGNIFICANT CHANGE UP (ref 2–14)
NEUTROPHILS # BLD AUTO: 10.11 K/UL — HIGH (ref 1.8–7.4)
NEUTROPHILS # BLD AUTO: 9.84 K/UL — HIGH (ref 1.8–7.4)
NEUTROPHILS # BLD AUTO: 9.85 K/UL — HIGH (ref 1.8–7.4)
NEUTROPHILS NFR BLD AUTO: 76.3 % — SIGNIFICANT CHANGE UP (ref 43–77)
NEUTROPHILS NFR BLD AUTO: 76.6 % — SIGNIFICANT CHANGE UP (ref 43–77)
NEUTROPHILS NFR BLD AUTO: 77.8 % — HIGH (ref 43–77)
NRBC # BLD: 0 /100 WBCS — SIGNIFICANT CHANGE UP (ref 0–0)
PCO2 BLDV: 34 MMHG — LOW (ref 39–42)
PCO2 BLDV: 35 MMHG — LOW (ref 39–42)
PCO2 BLDV: 36 MMHG — LOW (ref 39–42)
PCO2 BLDV: 37 MMHG — LOW (ref 39–42)
PH BLDV: 7.38 — SIGNIFICANT CHANGE UP (ref 7.32–7.43)
PH BLDV: 7.39 — SIGNIFICANT CHANGE UP (ref 7.32–7.43)
PH BLDV: 7.4 — SIGNIFICANT CHANGE UP (ref 7.32–7.43)
PH BLDV: 7.42 — SIGNIFICANT CHANGE UP (ref 7.32–7.43)
PHOSPHATE SERPL-MCNC: 2 MG/DL — LOW (ref 2.5–4.5)
PHOSPHATE SERPL-MCNC: 2.4 MG/DL — LOW (ref 2.5–4.5)
PHOSPHATE SERPL-MCNC: 3.1 MG/DL — SIGNIFICANT CHANGE UP (ref 2.5–4.5)
PLATELET # BLD AUTO: 74 K/UL — LOW (ref 150–400)
PLATELET # BLD AUTO: 79 K/UL — LOW (ref 150–400)
PLATELET # BLD AUTO: 88 K/UL — LOW (ref 150–400)
PO2 BLDV: 34 MMHG — SIGNIFICANT CHANGE UP (ref 25–45)
PO2 BLDV: 34 MMHG — SIGNIFICANT CHANGE UP (ref 25–45)
PO2 BLDV: 37 MMHG — SIGNIFICANT CHANGE UP (ref 25–45)
PO2 BLDV: 41 MMHG — SIGNIFICANT CHANGE UP (ref 25–45)
POTASSIUM SERPL-MCNC: 3.9 MMOL/L — SIGNIFICANT CHANGE UP (ref 3.5–5.3)
POTASSIUM SERPL-MCNC: 4.2 MMOL/L — SIGNIFICANT CHANGE UP (ref 3.5–5.3)
POTASSIUM SERPL-MCNC: 4.7 MMOL/L — SIGNIFICANT CHANGE UP (ref 3.5–5.3)
POTASSIUM SERPL-SCNC: 3.9 MMOL/L — SIGNIFICANT CHANGE UP (ref 3.5–5.3)
POTASSIUM SERPL-SCNC: 4.2 MMOL/L — SIGNIFICANT CHANGE UP (ref 3.5–5.3)
POTASSIUM SERPL-SCNC: 4.7 MMOL/L — SIGNIFICANT CHANGE UP (ref 3.5–5.3)
PROT SERPL-MCNC: 5.7 G/DL — LOW (ref 6–8.3)
PROT SERPL-MCNC: 6.1 G/DL — SIGNIFICANT CHANGE UP (ref 6–8.3)
PROT SERPL-MCNC: 6.2 G/DL — SIGNIFICANT CHANGE UP (ref 6–8.3)
PROTHROM AB SERPL-ACNC: 11.6 SEC — SIGNIFICANT CHANGE UP (ref 9.9–13.4)
PROTHROM AB SERPL-ACNC: 12.1 SEC — SIGNIFICANT CHANGE UP (ref 9.9–13.4)
PROTHROM AB SERPL-ACNC: 12.7 SEC — SIGNIFICANT CHANGE UP (ref 9.9–13.4)
RBC # BLD: 3.21 M/UL — LOW (ref 3.8–5.2)
RBC # BLD: 3.21 M/UL — LOW (ref 3.8–5.2)
RBC # BLD: 3.24 M/UL — LOW (ref 3.8–5.2)
RBC # FLD: 15.2 % — HIGH (ref 10.3–14.5)
RBC # FLD: 15.7 % — HIGH (ref 10.3–14.5)
RBC # FLD: 15.8 % — HIGH (ref 10.3–14.5)
SAO2 % BLDV: 59.9 % — LOW (ref 67–88)
SAO2 % BLDV: 60.3 % — LOW (ref 67–88)
SAO2 % BLDV: 66 % — LOW (ref 67–88)
SAO2 % BLDV: 68.7 % — SIGNIFICANT CHANGE UP (ref 67–88)
SODIUM SERPL-SCNC: 134 MMOL/L — LOW (ref 135–145)
SODIUM SERPL-SCNC: 135 MMOL/L — SIGNIFICANT CHANGE UP (ref 135–145)
SODIUM SERPL-SCNC: 139 MMOL/L — SIGNIFICANT CHANGE UP (ref 135–145)
WBC # BLD: 12.65 K/UL — HIGH (ref 3.8–10.5)
WBC # BLD: 12.91 K/UL — HIGH (ref 3.8–10.5)
WBC # BLD: 13.19 K/UL — HIGH (ref 3.8–10.5)
WBC # FLD AUTO: 12.65 K/UL — HIGH (ref 3.8–10.5)
WBC # FLD AUTO: 12.91 K/UL — HIGH (ref 3.8–10.5)
WBC # FLD AUTO: 13.19 K/UL — HIGH (ref 3.8–10.5)

## 2024-12-26 PROCEDURE — 99292 CRITICAL CARE ADDL 30 MIN: CPT

## 2024-12-26 PROCEDURE — 71045 X-RAY EXAM CHEST 1 VIEW: CPT | Mod: 26

## 2024-12-26 PROCEDURE — 99291 CRITICAL CARE FIRST HOUR: CPT

## 2024-12-26 RX ORDER — MAGNESIUM SULFATE 500 MG/ML
2 INJECTION, SOLUTION INTRAMUSCULAR; INTRAVENOUS ONCE
Refills: 0 | Status: COMPLETED | OUTPATIENT
Start: 2024-12-26 | End: 2024-12-26

## 2024-12-26 RX ORDER — BUMETANIDE 2 MG/1
1 TABLET ORAL ONCE
Refills: 0 | Status: COMPLETED | OUTPATIENT
Start: 2024-12-26 | End: 2024-12-26

## 2024-12-26 RX ORDER — GINKGO BILOBA 40 MG
5 CAPSULE ORAL AT BEDTIME
Refills: 0 | Status: DISCONTINUED | OUTPATIENT
Start: 2024-12-26 | End: 2025-01-09

## 2024-12-26 RX ORDER — FUROSEMIDE 20 MG
20 TABLET ORAL ONCE
Refills: 0 | Status: COMPLETED | OUTPATIENT
Start: 2024-12-26 | End: 2024-12-26

## 2024-12-26 RX ORDER — FUROSEMIDE 20 MG
40 TABLET ORAL ONCE
Refills: 0 | Status: COMPLETED | OUTPATIENT
Start: 2024-12-26 | End: 2024-12-26

## 2024-12-26 RX ORDER — SODIUM PHOSPHATE, MONOBASIC, MONOHYDRATE AND SODIUM PHOSPHATE, DIBASIC ANHYDROUS 142; 276 MG/ML; MG/ML
15 INJECTION, SOLUTION INTRAVENOUS ONCE
Refills: 0 | Status: COMPLETED | OUTPATIENT
Start: 2024-12-26 | End: 2024-12-26

## 2024-12-26 RX ORDER — CALCIUM GLUCONATE 94 MG/ML
2 INJECTION, SOLUTION INTRAVENOUS ONCE
Refills: 0 | Status: COMPLETED | OUTPATIENT
Start: 2024-12-26 | End: 2024-12-26

## 2024-12-26 RX ORDER — POTASSIUM CHLORIDE 600 MG/1
40 TABLET, FILM COATED, EXTENDED RELEASE ORAL ONCE
Refills: 0 | Status: COMPLETED | OUTPATIENT
Start: 2024-12-26 | End: 2024-12-26

## 2024-12-26 RX ADMIN — Medication 5 MILLIGRAM(S): at 22:21

## 2024-12-26 RX ADMIN — ACETAMINOPHEN 1000 MILLIGRAM(S): 80 SOLUTION/ DROPS ORAL at 18:45

## 2024-12-26 RX ADMIN — KETOROLAC TROMETHAMINE 15 MILLIGRAM(S): 30 INJECTION INTRAMUSCULAR; INTRAVENOUS at 13:10

## 2024-12-26 RX ADMIN — Medication 1 APPLICATION(S): at 17:48

## 2024-12-26 RX ADMIN — CHLORHEXIDINE GLUCONATE 1 APPLICATION(S): 1.2 RINSE ORAL at 06:24

## 2024-12-26 RX ADMIN — KETOROLAC TROMETHAMINE 15 MILLIGRAM(S): 30 INJECTION INTRAMUSCULAR; INTRAVENOUS at 03:30

## 2024-12-26 RX ADMIN — KETOROLAC TROMETHAMINE 15 MILLIGRAM(S): 30 INJECTION INTRAMUSCULAR; INTRAVENOUS at 12:56

## 2024-12-26 RX ADMIN — Medication 20 MILLIGRAM(S): at 06:24

## 2024-12-26 RX ADMIN — HEPARIN SODIUM 5000 UNIT(S): 1000 INJECTION, SOLUTION INTRAVENOUS; SUBCUTANEOUS at 06:23

## 2024-12-26 RX ADMIN — Medication 81 MILLIGRAM(S): at 12:13

## 2024-12-26 RX ADMIN — NICARDIPINE HYDROCHLORIDE 25 MG/HR: 2.5 INJECTION INTRAVENOUS at 17:04

## 2024-12-26 RX ADMIN — BUMETANIDE 1 MILLIGRAM(S): 2 TABLET ORAL at 22:22

## 2024-12-26 RX ADMIN — MAGNESIUM SULFATE 25 GRAM(S): 500 INJECTION, SOLUTION INTRAMUSCULAR; INTRAVENOUS at 12:56

## 2024-12-26 RX ADMIN — ACETAMINOPHEN 1000 MILLIGRAM(S): 80 SOLUTION/ DROPS ORAL at 12:35

## 2024-12-26 RX ADMIN — ACETAMINOPHEN 1000 MILLIGRAM(S): 80 SOLUTION/ DROPS ORAL at 17:48

## 2024-12-26 RX ADMIN — Medication 500 MILLIGRAM(S): at 06:24

## 2024-12-26 RX ADMIN — LATANOPROST 1 DROP(S): 50 SOLUTION OPHTHALMIC at 22:43

## 2024-12-26 RX ADMIN — Medication 40 MILLIGRAM(S): at 16:35

## 2024-12-26 RX ADMIN — Medication 500 MILLIGRAM(S): at 17:48

## 2024-12-26 RX ADMIN — Medication 1 APPLICATION(S): at 06:23

## 2024-12-26 RX ADMIN — PANTOPRAZOLE 40 MILLIGRAM(S): 40 TABLET, DELAYED RELEASE ORAL at 06:24

## 2024-12-26 RX ADMIN — ACETAMINOPHEN 1000 MILLIGRAM(S): 80 SOLUTION/ DROPS ORAL at 11:39

## 2024-12-26 RX ADMIN — CALCIUM GLUCONATE 200 GRAM(S): 94 INJECTION, SOLUTION INTRAVENOUS at 22:21

## 2024-12-26 RX ADMIN — SENNOSIDES 2 TABLET(S): 8.6 TABLET, FILM COATED ORAL at 21:55

## 2024-12-26 RX ADMIN — KETOROLAC TROMETHAMINE 15 MILLIGRAM(S): 30 INJECTION INTRAMUSCULAR; INTRAVENOUS at 03:15

## 2024-12-26 RX ADMIN — Medication 2: at 17:58

## 2024-12-26 RX ADMIN — POTASSIUM CHLORIDE 40 MILLIEQUIVALENT(S): 600 TABLET, FILM COATED, EXTENDED RELEASE ORAL at 12:57

## 2024-12-26 RX ADMIN — Medication 2: at 23:15

## 2024-12-26 RX ADMIN — SODIUM PHOSPHATE, MONOBASIC, MONOHYDRATE AND SODIUM PHOSPHATE, DIBASIC ANHYDROUS 62.5 MILLIMOLE(S): 142; 276 INJECTION, SOLUTION INTRAVENOUS at 06:22

## 2024-12-26 RX ADMIN — Medication 17 GRAM(S): at 11:39

## 2024-12-26 NOTE — PHYSICAL THERAPY INITIAL EVALUATION ADULT - ADDITIONAL COMMENTS
98.6 Per daughter at bedside: Patient lives with parveen in private home +4STE +2FoS to bedroom. Within the last year (approx), patient has req assist for ADLs and IADLs , utilized hand held assist for ambulation.

## 2024-12-26 NOTE — PHYSICAL THERAPY INITIAL EVALUATION ADULT - PERTINENT HX OF CURRENT PROBLEM, REHAB EVAL
59 year old Chinese speaking female with history of HTN, HLD (not on statin 2/2 elevated LFT's), Anemia, NSTEMI 2011 (no PCI), ? TIA in 2011 (no deficits), pAfib (on Eliquis), HFpEF (EF 55-60%), gallstones, admitted to [facility] for AVR, now POD 1 s/p AVR, ascending aorta replacement, and JIM appendage closure. LKW on 12/23. During morning rounds was noted to be more lethargic with left sided weakness, fluttering eyes, L eye with upward and inward gaze, R eye with intact gaze. Stroke code was called, NIHSS 3 utilizing Chinese . CTH negative, CTP negative. CTA H/N with 2 mm right MCA bifurcation aneurysm. After CT scans was noted to have fluttering eyes, with b/l UE twitching. Pt's left sided weakness has been persistent, but improved.

## 2024-12-26 NOTE — OCCUPATIONAL THERAPY INITIAL EVALUATION ADULT - GENERAL OBSERVATIONS, REHAB EVAL
OT IE complete. MRS 4. JEAN Johnson clearing pt. for session. PT Radha present. Pt. received seated in recliner, +swan (orders for OOB received), +central line, +tele, +IV, + R radial a-line, +gomez, +b/l SCDs, +O2 via NC 6L, +fabricio x3 to wall suction (removed by JEAN Johnson) OT IE complete. MRS 4. JEAN Johnson clearing pt. for session. PT Radha present. Pt. received seated in recliner, +swan (orders for OOB received), +central line, +tele, +IV, + R radial a-line, +gomez, +b/l SCDs, +O2 via NC 6L, +fabricio x3 to wall suction (removed by JEAN Johnson) with some sternal incisional pain but agreeable to session. OT IE complete. MRS 4. JEAN Johnson clearing pt. for session. PT Radha present. Pt. received seated in recliner, +swan (orders for OOB received), +central line, +tele, +IV, + R radial a-line, +gomez, +b/l SCDs, +O2 via NC 2L, +fabricio x3 to wall suction (removed by JEAN Johnson) with some sternal incisional pain but agreeable to session.

## 2024-12-26 NOTE — OCCUPATIONAL THERAPY INITIAL EVALUATION ADULT - PRECAUTIONS/LIMITATIONS, REHAB EVAL
NC 6L/fall precautions/oxygen therapy device and L/min/sternal precautions NC 2L/fall precautions/oxygen therapy device and L/min/sternal precautions

## 2024-12-26 NOTE — OCCUPATIONAL THERAPY INITIAL EVALUATION ADULT - ADDITIONAL COMMENTS
per daughter at bedside, pt. resides with her family in a home w 4 MIR and 2 FOS inside to bedroom. In the last ~year pt. required assist for ADLS/iADLs and mobility but used hand held assist

## 2024-12-26 NOTE — PHYSICAL THERAPY INITIAL EVALUATION ADULT - GENERAL OBSERVATIONS, REHAB EVAL
Patient encountered out of bed sitting in bedside chair in no apparent distress, +EKG +artur +central line +3x fabricio to wall suction +gomez  +temporary pacemaker +SCDs +swan-bree (RN managed throughout session) +NC @2L/min. Ernie Aldrich and Lucille present throughout session. MRS 4. Patient encountered out of bed sitting in bedside chair in no apparent distress, +EKG +artur +central line +3x fabricio to wall suction +gomez  +temporary pacemaker +SCDs +shade-bree (RN managed throughout session) +NC @2L/min. Ernie Aldrich and Lucille present throughout session.

## 2024-12-26 NOTE — OCCUPATIONAL THERAPY INITIAL EVALUATION ADULT - PERTINENT HX OF CURRENT PROBLEM, REHAB EVAL
58 year old Divehi speaking female with history of HTN, HLD (not on statin 2/2 elevated LFT's), Anemia, NSTEMI 2011 (no PCI), ? TIA in 2011 (no deficits), pAfib (on Eliquis), HFpEF (EF 55-60%), gallstones, presents today as a pre op admission for AVR, MYESHA Phan with Dr. Gray on 12/23. Pt denies CP, SOB, cough, fever, NVD on arrival to hospital.

## 2024-12-26 NOTE — OCCUPATIONAL THERAPY INITIAL EVALUATION ADULT - DIAGNOSIS, OT EVAL
Pt. is POD#3 (12/23) s/p ascending aorta replacement w. AV replacement. Course c/b stroke code, + R MERYL aneurysm. Upon assessment, pt. demo general weakness (BUE>BLE), impaired balance and activity tolerance impacting engagement in ADLs and functional mob/transfers.

## 2024-12-26 NOTE — PROGRESS NOTE ADULT - SUBJECTIVE AND OBJECTIVE BOX
CTICU  CRITICAL  CARE  attending     Hand off received 					   Pertinent clinical, laboratory, radiographic, hemodynamic, echocardiographic, respiratory data, microbiologic data and chart were reviewed and analyzed frequently throughout the course of the day and night      58 year old Italian speaking female with history of HTN, HLD, H/O statin intolerance (not on statin 2/2 elevated LFT's), Anemia, NSTEMI 2011 (no PCI), ? TIA in 2011 (no deficits), paroxysmal Afib (on Eliquis), HFpEF (EF 55-60%), gallstones.  She was evaluated for SOB.  ECHO: Severe AI  LHC: Non obstructive CAD.    S/P AVR, [facility], LAAO with Dr. Gray on 12/23        FAMILY HISTORY:  PAST MEDICAL & SURGICAL HISTORY:  HTN (hypertension)  HLD (hyperlipidemia)  GERD (gastroesophageal reflux disease)  Anemia  MI (myocardial infarction)  Paroxysmal atrial fibrillation  Severe aortic regurgitation  Cyst of left eyelid  Cyst of the left eye removed in the past month, pt still currently taking opthalmic medications        14 system review was unremarkable    Vital signs, hemodynamic and respiratory parameters were reviewed from the bedside nursing flow sheet.  ICU Vital Signs Last 24 Hrs  T(C): 36.3 (26 Dec 2024 16:55), Max: 37.4 (26 Dec 2024 01:17)  T(F): 97.4 (26 Dec 2024 16:55), Max: 99.3 (26 Dec 2024 01:17)  HR: 73 (26 Dec 2024 21:00) (69 - 90)  BP: --  BP(mean): --  ABP: 118/60 (26 Dec 2024 21:00) (99/48 - 124/60)  ABP(mean): 78 (26 Dec 2024 21:00) (64 - 83)  RR: 24 (26 Dec 2024 21:00) (18 - 33)  SpO2: 98% (26 Dec 2024 21:00) (91% - 100%)    O2 Parameters below as of 26 Dec 2024 21:00  Patient On (Oxygen Delivery Method): nasal cannula w/ humidification  O2 Flow (L/min): 2        Adult Advanced Hemodynamics Last 24 Hrs  CVP(mm Hg): 24 (26 Dec 2024 21:00) (11 - 24)  CVP(cm H2O): --  CO: 4 (26 Dec 2024 21:00) (3.2 - 4.4)  CI: 2.9 (26 Dec 2024 21:00) (2.3 - 3.2)  PA: 50/22 (26 Dec 2024 21:00) (36/11 - 57/23)  PA(mean): 33 (26 Dec 2024 21:00) (21 - 38)  PCWP: --  SVR: 1078 (26 Dec 2024 21:00) (998 - 1382)  SVRI: 1487 (26 Dec 2024 21:00) (1373 - 1874)  PVR: --  PVRI: --, ABG - ( 26 Dec 2024 21:14 )  pH, Arterial: 7.42  pH, Blood: x     /  pCO2: 24    /  pO2: 87    / HCO3: 16    / Base Excess: -7.0  /  SaO2: 97.9                Intake and output was reviewed and the fluid balance was calculated  Daily     Daily   I&O's Summary    25 Dec 2024 07:01  -  26 Dec 2024 07:00  --------------------------------------------------------  IN: 1477.7 mL / OUT: 2045 mL / NET: -567.3 mL    26 Dec 2024 07:01  -  26 Dec 2024 21:59  --------------------------------------------------------  IN: 1058.9 mL / OUT: 735 mL / NET: 323.9 mL            Neuro: Left sided weakness has improved. Gait and coordination back to baseline.  Neck: No JVD.  CVS: S1, S2, No S3.  Lungs: Good air entry bilaterally.  Abd: Soft. No tenderness. + Bowel sounds.  Vascular: + DP/PT.  Extremities: No edema.  Lymphatic: Normal.  Skin: No abnormalities.      labs  CBC Full  -  ( 26 Dec 2024 16:40 )  WBC Count : 12.91 K/uL  RBC Count : 3.21 M/uL  Hemoglobin : 10.0 g/dL  Hematocrit : 29.7 %  Platelet Count - Automated : 88 K/uL  Mean Cell Volume : 92.5 fl  Mean Cell Hemoglobin : 31.2 pg  Mean Cell Hemoglobin Concentration : 33.7 g/dL  Auto Neutrophil # : 9.85 K/uL  Auto Lymphocyte # : 1.78 K/uL  Auto Monocyte # : 1.15 K/uL  Auto Eosinophil # : 0.03 K/uL  Auto Basophil # : 0.02 K/uL  Auto Neutrophil % : 76.3 %  Auto Lymphocyte % : 13.8 %  Auto Monocyte % : 8.9 %  Auto Eosinophil % : 0.2 %  Auto Basophil % : 0.2 %    12-26    134[L]  |  104  |  23  ----------------------------<  170[H]  4.7   |  20[L]  |  0.90    Ca    8.2[L]      26 Dec 2024 16:40  Phos  2.4     12-26  Mg     2.7     12-26    TPro  6.1  /  Alb  3.5  /  TBili  0.4  /  DBili  x   /  AST  43[H]  /  ALT  6[L]  /  AlkPhos  72  12-26    PT/INR - ( 26 Dec 2024 16:40 )   PT: 11.6 sec;   INR: 0.99          PTT - ( 26 Dec 2024 16:40 )  PTT:31.3 sec  The current medications were reviewed   MEDICATIONS  (STANDING):  acetaminophen     Tablet .. 1000 milliGRAM(s) Oral every 6 hours  ascorbic acid 500 milliGRAM(s) Oral two times a day  aspirin enteric coated 81 milliGRAM(s) Oral daily  bisacodyl Suppository 10 milliGRAM(s) Rectal once  chlorhexidine 2% Cloths 1 Application(s) Topical daily  dextrose 5%. 1000 milliLiter(s) (50 mL/Hr) IV Continuous <Continuous>  dextrose 5%. 1000 milliLiter(s) (100 mL/Hr) IV Continuous <Continuous>  dextrose 50% Injectable 25 Gram(s) IV Push once  dextrose 50% Injectable 12.5 Gram(s) IV Push once  dextrose 50% Injectable 25 Gram(s) IV Push once  DOBUTamine Infusion 2.5 MICROgram(s)/kG/Min (3.38 mL/Hr) IV Continuous <Continuous>  glucagon  Injectable 1 milliGRAM(s) IntraMuscular once  heparin   Injectable 5000 Unit(s) SubCutaneous every 12 hours  insulin lispro (ADMELOG) corrective regimen sliding scale   SubCutaneous every 6 hours  latanoprost 0.005% Ophthalmic Solution 1 Drop(s) Both EYES at bedtime  milrinone Infusion 0.25 MICROgram(s)/kG/Min (3.38 mL/Hr) IV Continuous <Continuous>  mupirocin 2% Ointment 1 Application(s) Both Nostrils two times a day  niCARdipine Infusion 5 mG/Hr (25 mL/Hr) IV Continuous <Continuous>  pantoprazole    Tablet 40 milliGRAM(s) Oral before breakfast  polyethylene glycol 3350 17 Gram(s) Oral daily  senna 2 Tablet(s) Oral at bedtime  sodium chloride 0.9%. 1000 milliLiter(s) (10 mL/Hr) IV Continuous <Continuous>    MEDICATIONS  (PRN):  dextrose Oral Gel 15 Gram(s) Oral once PRN Blood Glucose LESS THAN 70 milliGRAM(s)/deciliter  ketorolac   Injectable 15 milliGRAM(s) IV Push every 8 hours PRN Moderate Pain (4 - 6)            59 year old  Female admitted with AI  S/P AVR  S/P Replacement of ascending aorta  S/P cryomaze and JIM occlusion  Postoperative course complicated by hypokalemia, hypophosphatemia, left sided weakness, mild encephalopathy.   CT HEAD:  No acute intracranial hemorrhage, mass effect or large demarcated territorial infarction  CT PERFUSION: Normal CT perfusion.  CTA NECK: No evidence of significant stenosis or occlusion.  CTA HEAD: No large  vessel occlusion or significant stenosis. 2 mm right MCA bifurcation aneurysm.  Hemodynamically stable.  Good oxygenation.  Fair urine out put.  Normal EEG  Neuro status back to baseline.       My plan includes :  Low dose dobutamine and milrinone   PO afterload reduction.  Close hemodynamic monitoring   Monitor for arrhythmias and monitor parameters for organ perfusion  Monitor neurologic status  Monitor renal function.  Head of the bed should remain elevated to 45 deg .   Chest PT and IS will be encouraged  Monitor adequacy of oxygenation   Nutritional goals will be met using po eventually , ensure adequate caloric intake and monitor the same  Stress ulcer and VTE prophylaxis will be achieved    OPTIMIZE Glycemic control   Electrolytes have been repleted as necessary and wound care has been carried out. Pain control has been achieved.   Aggressive physical therapy and early mobility and ambulation goals will be met   The family was updated about the course and plan  CRITICAL CARE TIME SPENT in evaluation and management, reassessments, review and interpretation of labs and x-rays, hemodynamic management, formulating a plan and coordinating care: ___30____ MIN.  Time does not include procedural time.  CTICU ATTENDING     					    Alexander Noble MD

## 2024-12-26 NOTE — PROGRESS NOTE ADULT - SUBJECTIVE AND OBJECTIVE BOX
CTICU  CRITICAL  CARE  attending     Hand off received 					   Pertinent clinical, laboratory, radiographic, hemodynamic, echocardiographic, respiratory data, microbiologic data and chart were reviewed and analyzed frequently throughout the course of the day  Patient seen and examined with CTS/ SH attending at bedside  Pt is a 59yr old female with PMH HTN, HLD, NSTEMI, pAfib on eliquis, HFpEF, admitted for surgical mgmt of AVR. Pt underwent Ascending aorta replacement (26mm graft), AV replacement (23mm Bio; EF nl) with cryomaze and JIM occlusion (CBP 113min, XC 84min) with Dr. Gray 24. Arrived intubated on  3, primacor .25, cardene 2.5.  increased to 5. Initial lactate 1.5. Did not tolerate CPAP overnight. : Weakness in L side. Stroke Code Called. CTH and CTA/H/N with no LVO or hemorrhage or acute infarct. CTCAP also done. EEG placed in CTICU. Lactate hernandez to 5.1 but cleared. TTE with nl biventricular function, severe biatrial enlargement, no pericardial effusion. Extubated overnight. Mental status improving. :  decreased to 2.5. Given 1 pRBC.     FAMILY HISTORY:  PAST MEDICAL & SURGICAL HISTORY:  HTN (hypertension)  HLD (hyperlipidemia)  GERD (gastroesophageal reflux disease)  Anemia  MI (myocardial infarction)  Paroxysmal atrial fibrillation  Severe aortic regurgitation  Cyst of left eyelid  Cyst of the left eye removed in the past month, pt still currently taking opthalmic medications        Patient is a 59y old  Female who presents with a chief complaint of AVR.      14 system review was unremarkable    Vital signs, hemodynamic and respiratory parameters were reviewed from the bedside nursing flowsheet.  ICU Vital Signs Last 24 Hrs  T(C): 37.3 (26 Dec 2024 05:19), Max: 37.4 (26 Dec 2024 01:17)  T(F): 99.1 (26 Dec 2024 05:19), Max: 99.3 (26 Dec 2024 01:17)  HR: 73 (26 Dec 2024 09:00) (69 - 90)  BP: --  BP(mean): --  ABP: 111/61 (26 Dec 2024 08:00) (99/47 - 126/59)  ABP(mean): 79 (26 Dec 2024 08:00) (63 - 79)  RR: 18 (26 Dec 2024 09:) (13 - 20)  SpO2: 100% (26 Dec 2024 09:00) (91% - 100%)    O2 Parameters below as of 26 Dec 2024 09:00  Patient On (Oxygen Delivery Method): nasal cannula, high flow  O2 Flow (L/min): 40  O2 Concentration (%): 40      Adult Advanced Hemodynamics Last 24 Hrs  CVP(mm Hg): 13 (26 Dec 2024 08:) (13 - 24)  CVP(cm H2O): --  CO: 4.3 (26 Dec 2024 07:00) (3.2 - 4.9)  CI: 3.1 (26 Dec 2024 07:00) (2.3 - 3.5)  PA: 39/15 (26 Dec 2024 08:) (37/14 - 57/23)  PA(mean): 24 (26 Dec 2024 08:) (22 - 38)  PCWP: --  SVR: 1096 (26 Dec 2024 07:) (799 - 1353)  SVRI: 1520 (26 Dec 2024 07:) (1118 - 1874)  PVR: --  PVRI: --, ABG - ( 26 Dec 2024 03:40 )  pH, Arterial: 7.45  pH, Blood: x     /  pCO2: 32    /  pO2: 66    / HCO3: 22    / Base Excess: -1.0  /  SaO2: 96.1                Intake and output was reviewed and the fluid balance was calculated  Daily     Daily   I&O's Summary    25 Dec 2024 07:01  -  26 Dec 2024 07:00  --------------------------------------------------------  IN: 1477.7 mL / OUT: 2045 mL / NET: -567.3 mL    26 Dec 2024 07:01  -  26 Dec 2024 09:04  --------------------------------------------------------  IN: 77.6 mL / OUT: 100 mL / NET: -22.4 mL        All lines and drain sites were assessed  [facility]      POCT Blood Glucose.: 135 mg/dL (26 Dec 2024 06:33)      Neuro:  HEENT:  Heart:  Lungs:  Abdomen:  Extremities:    Lines:    Tubes:      labs  CBC Full  -  ( 26 Dec 2024 03:39 )  WBC Count : 13.19 K/uL  RBC Count : 3.24 M/uL  Hemoglobin : 10.0 g/dL  Hematocrit : 30.0 %  Platelet Count - Automated : 74 K/uL  Mean Cell Volume : 92.6 fl  Mean Cell Hemoglobin : 30.9 pg  Mean Cell Hemoglobin Concentration : 33.3 g/dL  Auto Neutrophil # : 10.11 K/uL  Auto Lymphocyte # : 1.68 K/uL  Auto Monocyte # : 1.23 K/uL  Auto Eosinophil # : 0.01 K/uL  Auto Basophil # : 0.02 K/uL  Auto Neutrophil % : 76.6 %  Auto Lymphocyte % : 12.7 %  Auto Monocyte % : 9.3 %  Auto Eosinophil % : 0.1 %  Auto Basophil % : 0.2 %        139  |  106  |  21  ----------------------------<  138[H]  4.2   |  22  |  0.90    Ca    8.3[L]      26 Dec 2024 03:39  Phos  2.0       Mg     2.0         TPro  5.7[L]  /  Alb  3.5  /  TBili  0.6  /  DBili  x   /  AST  54[H]  /  ALT  7[L]  /  AlkPhos  63      PT/INR - ( 26 Dec 2024 03:39 )   PT: 12.7 sec;   INR: 1.11          PTT - ( 26 Dec 2024 03:39 )  PTT:35.1 sec  The current medications were reviewed   MEDICATIONS  (STANDING):  acetaminophen     Tablet .. 1000 milliGRAM(s) Oral every 6 hours  ascorbic acid 500 milliGRAM(s) Oral two times a day  aspirin enteric coated 81 milliGRAM(s) Oral daily  bisacodyl Suppository 10 milliGRAM(s) Rectal once  chlorhexidine 2% Cloths 1 Application(s) Topical daily  dextrose 5%. 1000 milliLiter(s) (100 mL/Hr) IV Continuous <Continuous>  dextrose 5%. 1000 milliLiter(s) (50 mL/Hr) IV Continuous <Continuous>  dextrose 50% Injectable 25 Gram(s) IV Push once  dextrose 50% Injectable 12.5 Gram(s) IV Push once  dextrose 50% Injectable 25 Gram(s) IV Push once  DOBUTamine Infusion 2.5 MICROgram(s)/kG/Min (3.38 mL/Hr) IV Continuous <Continuous>  glucagon  Injectable 1 milliGRAM(s) IntraMuscular once  heparin   Injectable 5000 Unit(s) SubCutaneous every 12 hours  insulin lispro (ADMELOG) corrective regimen sliding scale   SubCutaneous every 6 hours  latanoprost 0.005% Ophthalmic Solution 1 Drop(s) Both EYES at bedtime  milrinone Infusion 0.25 MICROgram(s)/kG/Min (3.38 mL/Hr) IV Continuous <Continuous>  mupirocin 2% Ointment 1 Application(s) Both Nostrils two times a day  niCARdipine Infusion 5 mG/Hr (25 mL/Hr) IV Continuous <Continuous>  pantoprazole    Tablet 40 milliGRAM(s) Oral before breakfast  polyethylene glycol 3350 17 Gram(s) Oral daily  senna 2 Tablet(s) Oral at bedtime  sodium chloride 0.9%. 1000 milliLiter(s) (10 mL/Hr) IV Continuous <Continuous>    MEDICATIONS  (PRN):  dextrose Oral Gel 15 Gram(s) Oral once PRN Blood Glucose LESS THAN 70 milliGRAM(s)/deciliter  ketorolac   Injectable 15 milliGRAM(s) IV Push every 8 hours PRN Moderate Pain (4 - 6)      Assessment/Plan:         s/p cardiac surgery    Acute systolic and diastolic heart failure evidenced by SOB and parenchymal infiltrates; will treat with diuresis    Cardiogenic shock on ionotropy    Vasogenic shock due to hypotension in the cticu , will keep on pressors    Hypovolemic shock - > 20% intravascular depletion will replete volume    Acute blood loss anemia with relative hypotension treated with > 1 unit PC    Acute respiratory failure ruled in due to hypoxemia, O2 sats < 91% on RA treated with HFNC    Acute respiratory failure ruled in due to hypercapnea on abg will treat with mechanical ventilation    Acute respiratory failure ruled in due to prolonged mechanical ventilation > 24 hrs on the vent due to failure to wean due to weak respiratory mechanics    Toxic metabolic encephalopathy ; sundowning due to anesthesia pain medications    Acidosis evidenced by anion gap and negative base excess    Acute kidney injury - creatinine > 0.3 due to combined prerenal and intrarenal factors can presume ATN    ESRD    Acute bobo-operative ischemic stroke    Moderate protein calorie malutrition    Diet as tolerated  Replete lytes prn  Monitor CT output  GI/DVT PPX  Bowel Regimen  Pain control  OOB with PT    Titrate pressor support to maintain MAP >70  Titrate inotrope support to maintain CI >2.2/MVO2 >60  Close hemodynamic, ventilatory and drain monitoring and management per post op routine  Monitor for arrhythmias and monitor parameters for organ perfusion  Beta blockade not administered due to hemodynamic instability and bradycardia  Monitor neurologic status  Head of the bed should remain elevated to 45 deg   Chest PT and IS will be encouraged  Monitor adequacy of oxygenation and ventilation and attempt to wean oxygen  Antibiotic regimen will be tailored to the clinical, laboratory and microbiologic data  Nutritional goals will be met using po eventually, ensure adequate caloric intake and monitor the same  Stress ulcer and VTE prophylaxis will be achieved    Glycemic control is satisfactory  Electrolytes have been repleted as necessary and wound care has been carried out   Pain control has been achieved.   Aggressive physical therapy and early mobility and ambulation goals will be met   The family was updated about the course and plan.    CRITICAL CARE TIME personally provided by me  in evaluation and management, reassessments, review and interpretation of labs and x-rays, ventilator and hemodynamic management, formulating a plan and coordinating care: ___140____ MIN.  Time does not include procedural time.      CTICU ATTENDING     					  Parish Malave MD CTICU  CRITICAL  CARE  attending     Hand off received 					   Pertinent clinical, laboratory, radiographic, hemodynamic, echocardiographic, respiratory data, microbiologic data and chart were reviewed and analyzed frequently throughout the course of the day  Patient seen and examined with CTS/ SH attending at bedside  Pt is a 59yr old female with PMH HTN, HLD, NSTEMI, pAfib on eliquis, HFpEF, admitted for surgical mgmt of AVR. Pt underwent Ascending aorta replacement (26mm graft), AV replacement (23mm Bio; EF nl) with cryomaze and JIM occlusion (CBP 113min, XC 84min) with Dr. Gray 24. Arrived intubated on  3, primacor .25, cardene 2.5.  increased to 5. Initial lactate 1.5. Did not tolerate CPAP overnight. : Weakness in L side. Stroke Code Called. CTH and CTA/H/N with no LVO or hemorrhage or acute infarct. CTCAP also done. EEG placed in CTICU. Lactate hernandez to 5.1 but cleared. TTE with nl biventricular function, severe biatrial enlargement, no pericardial effusion. Extubated overnight. Mental status improving. :  decreased to 2.5. Given 1 pRBC.     FAMILY HISTORY:  PAST MEDICAL & SURGICAL HISTORY:  HTN (hypertension)  HLD (hyperlipidemia)  GERD (gastroesophageal reflux disease)  Anemia  MI (myocardial infarction)  Paroxysmal atrial fibrillation  Severe aortic regurgitation  Cyst of left eyelid  Cyst of the left eye removed in the past month, pt still currently taking opthalmic medications        Patient is a 59y old  Female who presents with a chief complaint of AVR.      14 system review was unremarkable    Vital signs, hemodynamic and respiratory parameters were reviewed from the bedside nursing flowsheet.  ICU Vital Signs Last 24 Hrs  T(C): 37.3 (26 Dec 2024 05:19), Max: 37.4 (26 Dec 2024 01:17)  T(F): 99.1 (26 Dec 2024 05:19), Max: 99.3 (26 Dec 2024 01:17)  HR: 73 (26 Dec 2024 09:00) (69 - 90)  BP: --  BP(mean): --  ABP: 111/61 (26 Dec 2024 08:00) (99/47 - 126/59)  ABP(mean): 79 (26 Dec 2024 08:00) (63 - 79)  RR: 18 (26 Dec 2024 09:) (13 - 20)  SpO2: 100% (26 Dec 2024 09:00) (91% - 100%)    O2 Parameters below as of 26 Dec 2024 09:00  Patient On (Oxygen Delivery Method): nasal cannula, high flow  O2 Flow (L/min): 40  O2 Concentration (%): 40      Adult Advanced Hemodynamics Last 24 Hrs  CVP(mm Hg): 13 (26 Dec 2024 08:) (13 - 24)  CVP(cm H2O): --  CO: 4.3 (26 Dec 2024 07:00) (3.2 - 4.9)  CI: 3.1 (26 Dec 2024 07:00) (2.3 - 3.5)  PA: 39/15 (26 Dec 2024 08:) (37/14 - 57/23)  PA(mean): 24 (26 Dec 2024 08:) (22 - 38)  PCWP: --  SVR: 1096 (26 Dec 2024 07:) (799 - 1353)  SVRI: 1520 (26 Dec 2024 07:) (1118 - 1874)  PVR: --  PVRI: --, ABG - ( 26 Dec 2024 03:40 )  pH, Arterial: 7.45  pH, Blood: x     /  pCO2: 32    /  pO2: 66    / HCO3: 22    / Base Excess: -1.0  /  SaO2: 96.1                Intake and output was reviewed and the fluid balance was calculated  Daily     Daily   I&O's Summary    25 Dec 2024 07:01  -  26 Dec 2024 07:00  --------------------------------------------------------  IN: 1477.7 mL / OUT: 2045 mL / NET: -567.3 mL    26 Dec 2024 07:01  -  26 Dec 2024 09:04  --------------------------------------------------------  IN: 77.6 mL / OUT: 100 mL / NET: -22.4 mL        All lines and drain sites were assessed  Glycemic trend [facility]      POCT Blood Glucose.: 135 mg/dL (26 Dec 2024 06:33)    Neuro: sitting up, following commands  HEENT: mmm  Heart: s1 s2  Lungs:   Abdomen: soft, nt nd  Extremities: wwp    Lines:  RIj Cordis with [place]   R radial arterial line     Tubes:  Med x 3      labs  CBC Full  -  ( 26 Dec 2024 03:39 )  WBC Count : 13.19 K/uL  RBC Count : 3.24 M/uL  Hemoglobin : 10.0 g/dL  Hematocrit : 30.0 %  Platelet Count - Automated : 74 K/uL  Mean Cell Volume : 92.6 fl  Mean Cell Hemoglobin : 30.9 pg  Mean Cell Hemoglobin Concentration : 33.3 g/dL  Auto Neutrophil # : 10.11 K/uL  Auto Lymphocyte # : 1.68 K/uL  Auto Monocyte # : 1.23 K/uL  Auto Eosinophil # : 0.01 K/uL  Auto Basophil # : 0.02 K/uL  Auto Neutrophil % : 76.6 %  Auto Lymphocyte % : 12.7 %  Auto Monocyte % : 9.3 %  Auto Eosinophil % : 0.1 %  Auto Basophil % : 0.2 %        139  |  106  |  21  ----------------------------<  138[H]  4.2   |  22  |  0.90    Ca    8.3[L]      26 Dec 2024 03:39  Phos  2.0       Mg     2.0         TPro  5.7[L]  /  Alb  3.5  /  TBili  0.6  /  DBili  x   /  AST  54[H]  /  ALT  7[L]  /  AlkPhos  63      PT/INR - ( 26 Dec 2024 03:39 )   PT: 12.7 sec;   INR: 1.11          PTT - ( 26 Dec 2024 03:39 )  PTT:35.1 sec  The current medications were reviewed   MEDICATIONS  (STANDING):  acetaminophen     Tablet .. 1000 milliGRAM(s) Oral every 6 hours  ascorbic acid 500 milliGRAM(s) Oral two times a day  aspirin enteric coated 81 milliGRAM(s) Oral daily  bisacodyl Suppository 10 milliGRAM(s) Rectal once  chlorhexidine 2% Cloths 1 Application(s) Topical daily  dextrose 5%. 1000 milliLiter(s) (100 mL/Hr) IV Continuous <Continuous>  dextrose 5%. 1000 milliLiter(s) (50 mL/Hr) IV Continuous <Continuous>  dextrose 50% Injectable 25 Gram(s) IV Push once  dextrose 50% Injectable 12.5 Gram(s) IV Push once  dextrose 50% Injectable 25 Gram(s) IV Push once  DOBUTamine Infusion 2.5 MICROgram(s)/kG/Min (3.38 mL/Hr) IV Continuous <Continuous>  glucagon  Injectable 1 milliGRAM(s) IntraMuscular once  heparin   Injectable 5000 Unit(s) SubCutaneous every 12 hours  insulin lispro (ADMELOG) corrective regimen sliding scale   SubCutaneous every 6 hours  latanoprost 0.005% Ophthalmic Solution 1 Drop(s) Both EYES at bedtime  milrinone Infusion 0.25 MICROgram(s)/kG/Min (3.38 mL/Hr) IV Continuous <Continuous>  mupirocin 2% Ointment 1 Application(s) Both Nostrils two times a day  niCARdipine Infusion 5 mG/Hr (25 mL/Hr) IV Continuous <Continuous>  pantoprazole    Tablet 40 milliGRAM(s) Oral before breakfast  polyethylene glycol 3350 17 Gram(s) Oral daily  senna 2 Tablet(s) Oral at bedtime  sodium chloride 0.9%. 1000 milliLiter(s) (10 mL/Hr) IV Continuous <Continuous>    MEDICATIONS  (PRN):  dextrose Oral Gel 15 Gram(s) Oral once PRN Blood Glucose LESS THAN 70 milliGRAM(s)/deciliter  ketorolac   Injectable 15 milliGRAM(s) IV Push every 8 hours PRN Moderate Pain (4 - 6)      Assessment/Plan:  59yr old female with PMH HTN, HLD, NSTEMI, pAfib on eliquis, HFpEF, admitted for surgical mgmt of AVR.     POD3 Ascending aorta replacement (26mm graft), AV replacement (23mm Bio; EF nl) with cryomaze and JIM occlusion (CBP 113min, XC 84min, [place],  24)  Cardiogenic shock on  and primacor-wean as tolerated  Serial CI/CO  Trend end organ perfusion markers  Cardene for Systolic goal <130  Stroke Neurology following  Likely MRI later  EEG negative  Acute post operative anemia due to acute blood loss-trend H/H  Thrombocytopenia-monitor  Hyperglycemia-insulin per protocol  Aspirin  Diet as tolerated  Replete lytes prn  Monitor CT output  GI/DVT PPX  Bowel Regimen  Pain control  OOB with PT  Titrate inotrope support to maintain CI >2.2/MVO2 >60  Close hemodynamic, ventilatory and drain monitoring and management per post op routine  Monitor for arrhythmias and monitor parameters for organ perfusion  Beta blockade not administered due to hemodynamic instability and bradycardia  Monitor neurologic status  Head of the bed should remain elevated to 45 deg   Chest PT and IS will be encouraged  Monitor adequacy of oxygenation and ventilation and attempt to wean oxygen  Antibiotic regimen will be tailored to the clinical, laboratory and microbiologic data  Nutritional goals will be met using po eventually, ensure adequate caloric intake and monitor the same  Stress ulcer and VTE prophylaxis will be achieved    Glycemic control is satisfactory  Electrolytes have been repleted as necessary and wound care has been carried out   Pain control has been achieved.   Aggressive physical therapy and early mobility and ambulation goals will be met   The family was updated about the course and plan.    CRITICAL CARE TIME personally provided by me  in evaluation and management, reassessments, review and interpretation of labs and x-rays, ventilator and hemodynamic management, formulating a plan and coordinating care: ___140____ MIN.  Time does not include procedural time.      CTICU ATTENDING     					  Parish Malave MD

## 2024-12-26 NOTE — PHYSICAL THERAPY INITIAL EVALUATION ADULT - IMPAIRMENTS CONTRIBUTING TO GAIT DEVIATIONS, PT EVAL
slightly unsteady with no ep of LOB, decreased endurance, wide base of support/impaired balance/pain/decreased strength

## 2024-12-26 NOTE — PHYSICAL THERAPY INITIAL EVALUATION ADULT - IMPAIRED TRANSFERS: SIT/STAND, REHAB EVAL
L sided weakness, decreased endurance/impaired balance/pain/impaired postural control/decreased strength

## 2024-12-26 NOTE — OCCUPATIONAL THERAPY INITIAL EVALUATION ADULT - MODIFIED CLINICAL TEST OF SENSORY INTEGRATION IN BALANCE TEST
pt. performed functional mob in room with Min Ax1 with BUE support on portable monitor, ~8 steps forwards x2, slightly unsteady

## 2024-12-26 NOTE — PHYSICAL THERAPY INITIAL EVALUATION ADULT - LEVEL OF INDEPENDENCE: SIT/STAND, REHAB EVAL
trial #1 @ modA, trial #2 at [place]/minimum assist (75% patients effort)/moderate assist (50% patients effort)

## 2024-12-26 NOTE — PHYSICAL THERAPY INITIAL EVALUATION ADULT - MANUAL MUSCLE TESTING RESULTS, REHAB EVAL
RUE @4/5 at all major pivot points, except shoulder flexio n@ 3-/5 d/t sternal precautions. LUE @ 3-/5 at all major pivot points except  strength @ 3/5 and shoulder flexion @ 3-/5 d/t sternal precautions. LLE: hip flex 4-/5, knee flex/ext 4/5, ankle pf/dc @ 4+/5, LLE: hip flexion @ 3+/5, knee flex/ext @ 4-/5, ankle pf/df @4+/5

## 2024-12-27 LAB
ALBUMIN SERPL ELPH-MCNC: 3.4 G/DL — SIGNIFICANT CHANGE UP (ref 3.3–5)
ALBUMIN SERPL ELPH-MCNC: 3.5 G/DL — SIGNIFICANT CHANGE UP (ref 3.3–5)
ALBUMIN SERPL ELPH-MCNC: 3.7 G/DL — SIGNIFICANT CHANGE UP (ref 3.3–5)
ALP SERPL-CCNC: 74 U/L — SIGNIFICANT CHANGE UP (ref 40–120)
ALP SERPL-CCNC: 79 U/L — SIGNIFICANT CHANGE UP (ref 40–120)
ALP SERPL-CCNC: 93 U/L — SIGNIFICANT CHANGE UP (ref 40–120)
ALT FLD-CCNC: 5 U/L — LOW (ref 10–45)
ALT FLD-CCNC: 6 U/L — LOW (ref 10–45)
ALT FLD-CCNC: 7 U/L — LOW (ref 10–45)
ANION GAP SERPL CALC-SCNC: 11 MMOL/L — SIGNIFICANT CHANGE UP (ref 5–17)
ANION GAP SERPL CALC-SCNC: 11 MMOL/L — SIGNIFICANT CHANGE UP (ref 5–17)
ANION GAP SERPL CALC-SCNC: 8 MMOL/L — SIGNIFICANT CHANGE UP (ref 5–17)
APTT BLD: 30 SEC — SIGNIFICANT CHANGE UP (ref 24.5–35.6)
APTT BLD: 30.1 SEC — SIGNIFICANT CHANGE UP (ref 24.5–35.6)
APTT BLD: 30.7 SEC — SIGNIFICANT CHANGE UP (ref 24.5–35.6)
AST SERPL-CCNC: 39 U/L — SIGNIFICANT CHANGE UP (ref 10–40)
AST SERPL-CCNC: 40 U/L — SIGNIFICANT CHANGE UP (ref 10–40)
AST SERPL-CCNC: 40 U/L — SIGNIFICANT CHANGE UP (ref 10–40)
BASE EXCESS BLDV CALC-SCNC: -0.4 MMOL/L — SIGNIFICANT CHANGE UP (ref -2–3)
BASE EXCESS BLDV CALC-SCNC: -1.8 MMOL/L — SIGNIFICANT CHANGE UP (ref -2–3)
BASE EXCESS BLDV CALC-SCNC: -5.7 MMOL/L — LOW (ref -2–3)
BASE EXCESS BLDV CALC-SCNC: 1.5 MMOL/L — SIGNIFICANT CHANGE UP (ref -2–3)
BASOPHILS # BLD AUTO: 0.01 K/UL — SIGNIFICANT CHANGE UP (ref 0–0.2)
BASOPHILS # BLD AUTO: 0.02 K/UL — SIGNIFICANT CHANGE UP (ref 0–0.2)
BASOPHILS NFR BLD AUTO: 0.1 % — SIGNIFICANT CHANGE UP (ref 0–2)
BASOPHILS NFR BLD AUTO: 0.2 % — SIGNIFICANT CHANGE UP (ref 0–2)
BILIRUB SERPL-MCNC: 0.5 MG/DL — SIGNIFICANT CHANGE UP (ref 0.2–1.2)
BUN SERPL-MCNC: 21 MG/DL — SIGNIFICANT CHANGE UP (ref 7–23)
BUN SERPL-MCNC: 24 MG/DL — HIGH (ref 7–23)
BUN SERPL-MCNC: 24 MG/DL — HIGH (ref 7–23)
CALCIUM SERPL-MCNC: 8.5 MG/DL — SIGNIFICANT CHANGE UP (ref 8.4–10.5)
CALCIUM SERPL-MCNC: 8.5 MG/DL — SIGNIFICANT CHANGE UP (ref 8.4–10.5)
CALCIUM SERPL-MCNC: 8.9 MG/DL — SIGNIFICANT CHANGE UP (ref 8.4–10.5)
CHLORIDE SERPL-SCNC: 103 MMOL/L — SIGNIFICANT CHANGE UP (ref 96–108)
CHLORIDE SERPL-SCNC: 96 MMOL/L — SIGNIFICANT CHANGE UP (ref 96–108)
CHLORIDE SERPL-SCNC: 98 MMOL/L — SIGNIFICANT CHANGE UP (ref 96–108)
CO2 BLDV-SCNC: 19 MMOL/L — LOW (ref 22–26)
CO2 BLDV-SCNC: 24 MMOL/L — SIGNIFICANT CHANGE UP (ref 22–26)
CO2 BLDV-SCNC: 26 MMOL/L — SIGNIFICANT CHANGE UP (ref 22–26)
CO2 BLDV-SCNC: 28 MMOL/L — HIGH (ref 22–26)
CO2 SERPL-SCNC: 20 MMOL/L — LOW (ref 22–31)
CO2 SERPL-SCNC: 23 MMOL/L — SIGNIFICANT CHANGE UP (ref 22–31)
CO2 SERPL-SCNC: 25 MMOL/L — SIGNIFICANT CHANGE UP (ref 22–31)
CREAT SERPL-MCNC: 0.74 MG/DL — SIGNIFICANT CHANGE UP (ref 0.5–1.3)
CREAT SERPL-MCNC: 0.81 MG/DL — SIGNIFICANT CHANGE UP (ref 0.5–1.3)
CREAT SERPL-MCNC: 0.92 MG/DL — SIGNIFICANT CHANGE UP (ref 0.5–1.3)
EGFR: 71 ML/MIN/1.73M2 — SIGNIFICANT CHANGE UP
EGFR: 83 ML/MIN/1.73M2 — SIGNIFICANT CHANGE UP
EGFR: 93 ML/MIN/1.73M2 — SIGNIFICANT CHANGE UP
EOSINOPHIL # BLD AUTO: 0.02 K/UL — SIGNIFICANT CHANGE UP (ref 0–0.5)
EOSINOPHIL # BLD AUTO: 0.21 K/UL — SIGNIFICANT CHANGE UP (ref 0–0.5)
EOSINOPHIL NFR BLD AUTO: 0.2 % — SIGNIFICANT CHANGE UP (ref 0–6)
EOSINOPHIL NFR BLD AUTO: 2.6 % — SIGNIFICANT CHANGE UP (ref 0–6)
GAS PNL BLDA: SIGNIFICANT CHANGE UP
GAS PNL BLDV: SIGNIFICANT CHANGE UP
GAS PNL BLDV: SIGNIFICANT CHANGE UP
GLUCOSE SERPL-MCNC: 109 MG/DL — HIGH (ref 70–99)
GLUCOSE SERPL-MCNC: 126 MG/DL — HIGH (ref 70–99)
GLUCOSE SERPL-MCNC: 159 MG/DL — HIGH (ref 70–99)
HCO3 BLDV-SCNC: 18 MMOL/L — LOW (ref 22–29)
HCO3 BLDV-SCNC: 23 MMOL/L — SIGNIFICANT CHANGE UP (ref 22–29)
HCO3 BLDV-SCNC: 25 MMOL/L — SIGNIFICANT CHANGE UP (ref 22–29)
HCO3 BLDV-SCNC: 27 MMOL/L — SIGNIFICANT CHANGE UP (ref 22–29)
HCT VFR BLD CALC: 28.2 % — LOW (ref 34.5–45)
HCT VFR BLD CALC: 28.2 % — LOW (ref 34.5–45)
HCT VFR BLD CALC: 29.4 % — LOW (ref 34.5–45)
HGB BLD-MCNC: 9.5 G/DL — LOW (ref 11.5–15.5)
HGB BLD-MCNC: 9.6 G/DL — LOW (ref 11.5–15.5)
HGB BLD-MCNC: 9.8 G/DL — LOW (ref 11.5–15.5)
IMM GRANULOCYTES NFR BLD AUTO: 0.8 % — SIGNIFICANT CHANGE UP (ref 0–0.9)
IMM GRANULOCYTES NFR BLD AUTO: 0.9 % — SIGNIFICANT CHANGE UP (ref 0–0.9)
INR BLD: 0.98 — SIGNIFICANT CHANGE UP (ref 0.85–1.16)
INR BLD: 0.98 — SIGNIFICANT CHANGE UP (ref 0.85–1.16)
INR BLD: 0.99 — SIGNIFICANT CHANGE UP (ref 0.85–1.16)
IRON SATN MFR SERPL: 27 % — SIGNIFICANT CHANGE UP (ref 14–50)
IRON SATN MFR SERPL: 47 UG/DL — SIGNIFICANT CHANGE UP (ref 30–160)
LACTATE SERPL-SCNC: 1 MMOL/L — SIGNIFICANT CHANGE UP (ref 0.5–2)
LACTATE SERPL-SCNC: 1.4 MMOL/L — SIGNIFICANT CHANGE UP (ref 0.5–2)
LACTATE SERPL-SCNC: 1.8 MMOL/L — SIGNIFICANT CHANGE UP (ref 0.5–2)
LACTATE SERPL-SCNC: 1.8 MMOL/L — SIGNIFICANT CHANGE UP (ref 0.5–2)
LYMPHOCYTES # BLD AUTO: 1.5 K/UL — SIGNIFICANT CHANGE UP (ref 1–3.3)
LYMPHOCYTES # BLD AUTO: 1.91 K/UL — SIGNIFICANT CHANGE UP (ref 1–3.3)
LYMPHOCYTES # BLD AUTO: 12.9 % — LOW (ref 13–44)
LYMPHOCYTES # BLD AUTO: 23.3 % — SIGNIFICANT CHANGE UP (ref 13–44)
MAGNESIUM SERPL-MCNC: 1.9 MG/DL — SIGNIFICANT CHANGE UP (ref 1.6–2.6)
MAGNESIUM SERPL-MCNC: 2.3 MG/DL — SIGNIFICANT CHANGE UP (ref 1.6–2.6)
MAGNESIUM SERPL-MCNC: 2.3 MG/DL — SIGNIFICANT CHANGE UP (ref 1.6–2.6)
MCHC RBC-ENTMCNC: 30.9 PG — SIGNIFICANT CHANGE UP (ref 27–34)
MCHC RBC-ENTMCNC: 31.3 PG — SIGNIFICANT CHANGE UP (ref 27–34)
MCHC RBC-ENTMCNC: 31.4 PG — SIGNIFICANT CHANGE UP (ref 27–34)
MCHC RBC-ENTMCNC: 33.3 G/DL — SIGNIFICANT CHANGE UP (ref 32–36)
MCHC RBC-ENTMCNC: 33.7 G/DL — SIGNIFICANT CHANGE UP (ref 32–36)
MCHC RBC-ENTMCNC: 34 G/DL — SIGNIFICANT CHANGE UP (ref 32–36)
MCV RBC AUTO: 92.2 FL — SIGNIFICANT CHANGE UP (ref 80–100)
MCV RBC AUTO: 92.7 FL — SIGNIFICANT CHANGE UP (ref 80–100)
MCV RBC AUTO: 92.8 FL — SIGNIFICANT CHANGE UP (ref 80–100)
MONOCYTES # BLD AUTO: 0.78 K/UL — SIGNIFICANT CHANGE UP (ref 0–0.9)
MONOCYTES # BLD AUTO: 0.95 K/UL — HIGH (ref 0–0.9)
MONOCYTES NFR BLD AUTO: 8.2 % — SIGNIFICANT CHANGE UP (ref 2–14)
MONOCYTES NFR BLD AUTO: 9.5 % — SIGNIFICANT CHANGE UP (ref 2–14)
NEUTROPHILS # BLD AUTO: 5.23 K/UL — SIGNIFICANT CHANGE UP (ref 1.8–7.4)
NEUTROPHILS # BLD AUTO: 9.01 K/UL — HIGH (ref 1.8–7.4)
NEUTROPHILS NFR BLD AUTO: 63.6 % — SIGNIFICANT CHANGE UP (ref 43–77)
NEUTROPHILS NFR BLD AUTO: 77.7 % — HIGH (ref 43–77)
NRBC # BLD: 0 /100 WBCS — SIGNIFICANT CHANGE UP (ref 0–0)
PCO2 BLDV: 31 MMHG — LOW (ref 39–42)
PCO2 BLDV: 39 MMHG — SIGNIFICANT CHANGE UP (ref 39–42)
PCO2 BLDV: 42 MMHG — SIGNIFICANT CHANGE UP (ref 39–42)
PCO2 BLDV: 43 MMHG — HIGH (ref 39–42)
PH BLDV: 7.38 — SIGNIFICANT CHANGE UP (ref 7.32–7.43)
PH BLDV: 7.4 — SIGNIFICANT CHANGE UP (ref 7.32–7.43)
PHOSPHATE SERPL-MCNC: 2.8 MG/DL — SIGNIFICANT CHANGE UP (ref 2.5–4.5)
PHOSPHATE SERPL-MCNC: 3.7 MG/DL — SIGNIFICANT CHANGE UP (ref 2.5–4.5)
PHOSPHATE SERPL-MCNC: 3.8 MG/DL — SIGNIFICANT CHANGE UP (ref 2.5–4.5)
PLATELET # BLD AUTO: 113 K/UL — LOW (ref 150–400)
PLATELET # BLD AUTO: 87 K/UL — LOW (ref 150–400)
PLATELET # BLD AUTO: 95 K/UL — LOW (ref 150–400)
PO2 BLDV: 36 MMHG — SIGNIFICANT CHANGE UP (ref 25–45)
PO2 BLDV: 36 MMHG — SIGNIFICANT CHANGE UP (ref 25–45)
PO2 BLDV: 40 MMHG — SIGNIFICANT CHANGE UP (ref 25–45)
PO2 BLDV: 42 MMHG — SIGNIFICANT CHANGE UP (ref 25–45)
POTASSIUM SERPL-MCNC: 3.8 MMOL/L — SIGNIFICANT CHANGE UP (ref 3.5–5.3)
POTASSIUM SERPL-MCNC: 4.3 MMOL/L — SIGNIFICANT CHANGE UP (ref 3.5–5.3)
POTASSIUM SERPL-MCNC: 4.8 MMOL/L — SIGNIFICANT CHANGE UP (ref 3.5–5.3)
POTASSIUM SERPL-SCNC: 3.8 MMOL/L — SIGNIFICANT CHANGE UP (ref 3.5–5.3)
POTASSIUM SERPL-SCNC: 4.3 MMOL/L — SIGNIFICANT CHANGE UP (ref 3.5–5.3)
POTASSIUM SERPL-SCNC: 4.8 MMOL/L — SIGNIFICANT CHANGE UP (ref 3.5–5.3)
PROT SERPL-MCNC: 6.1 G/DL — SIGNIFICANT CHANGE UP (ref 6–8.3)
PROT SERPL-MCNC: 6.2 G/DL — SIGNIFICANT CHANGE UP (ref 6–8.3)
PROT SERPL-MCNC: 6.4 G/DL — SIGNIFICANT CHANGE UP (ref 6–8.3)
PROTHROM AB SERPL-ACNC: 11.3 SEC — SIGNIFICANT CHANGE UP (ref 9.9–13.4)
PROTHROM AB SERPL-ACNC: 11.4 SEC — SIGNIFICANT CHANGE UP (ref 9.9–13.4)
PROTHROM AB SERPL-ACNC: 11.5 SEC — SIGNIFICANT CHANGE UP (ref 9.9–13.4)
RBC # BLD: 3.04 M/UL — LOW (ref 3.8–5.2)
RBC # BLD: 3.06 M/UL — LOW (ref 3.8–5.2)
RBC # BLD: 3.17 M/UL — LOW (ref 3.8–5.2)
RBC # FLD: 14.9 % — HIGH (ref 10.3–14.5)
RBC # FLD: 15.2 % — HIGH (ref 10.3–14.5)
RBC # FLD: 15.3 % — HIGH (ref 10.3–14.5)
SAO2 % BLDV: 59.1 % — LOW (ref 67–88)
SAO2 % BLDV: 62.3 % — LOW (ref 67–88)
SAO2 % BLDV: 66.4 % — LOW (ref 67–88)
SAO2 % BLDV: 71.5 % — SIGNIFICANT CHANGE UP (ref 67–88)
SODIUM SERPL-SCNC: 129 MMOL/L — LOW (ref 135–145)
SODIUM SERPL-SCNC: 132 MMOL/L — LOW (ref 135–145)
SODIUM SERPL-SCNC: 134 MMOL/L — LOW (ref 135–145)
TIBC SERPL-MCNC: 176 UG/DL — LOW (ref 220–430)
UIBC SERPL-MCNC: 129 UG/DL — SIGNIFICANT CHANGE UP (ref 110–370)
WBC # BLD: 11.59 K/UL — HIGH (ref 3.8–10.5)
WBC # BLD: 8.21 K/UL — SIGNIFICANT CHANGE UP (ref 3.8–10.5)
WBC # BLD: 8.78 K/UL — SIGNIFICANT CHANGE UP (ref 3.8–10.5)
WBC # FLD AUTO: 11.59 K/UL — HIGH (ref 3.8–10.5)
WBC # FLD AUTO: 8.21 K/UL — SIGNIFICANT CHANGE UP (ref 3.8–10.5)
WBC # FLD AUTO: 8.78 K/UL — SIGNIFICANT CHANGE UP (ref 3.8–10.5)

## 2024-12-27 PROCEDURE — 99292 CRITICAL CARE ADDL 30 MIN: CPT

## 2024-12-27 PROCEDURE — 71045 X-RAY EXAM CHEST 1 VIEW: CPT | Mod: 26

## 2024-12-27 PROCEDURE — 99291 CRITICAL CARE FIRST HOUR: CPT

## 2024-12-27 RX ORDER — POTASSIUM CHLORIDE 600 MG/1
10 TABLET, FILM COATED, EXTENDED RELEASE ORAL ONCE
Refills: 0 | Status: COMPLETED | OUTPATIENT
Start: 2024-12-27 | End: 2024-12-27

## 2024-12-27 RX ORDER — INSULIN LISPRO 100/ML
VIAL (ML) SUBCUTANEOUS
Refills: 0 | Status: DISCONTINUED | OUTPATIENT
Start: 2024-12-27 | End: 2024-12-30

## 2024-12-27 RX ORDER — KETOROLAC TROMETHAMINE 30 MG/ML
15 INJECTION INTRAMUSCULAR; INTRAVENOUS EVERY 8 HOURS
Refills: 0 | Status: DISCONTINUED | OUTPATIENT
Start: 2024-12-27 | End: 2024-12-28

## 2024-12-27 RX ORDER — MAGNESIUM SULFATE 500 MG/ML
2 INJECTION, SOLUTION INTRAMUSCULAR; INTRAVENOUS ONCE
Refills: 0 | Status: COMPLETED | OUTPATIENT
Start: 2024-12-27 | End: 2024-12-27

## 2024-12-27 RX ORDER — FUROSEMIDE 20 MG
20 TABLET ORAL EVERY 8 HOURS
Refills: 0 | Status: COMPLETED | OUTPATIENT
Start: 2024-12-27 | End: 2024-12-28

## 2024-12-27 RX ORDER — ACETAMINOPHEN 80 MG/.8ML
1000 SOLUTION/ DROPS ORAL EVERY 6 HOURS
Refills: 0 | Status: DISCONTINUED | OUTPATIENT
Start: 2024-12-27 | End: 2024-12-29

## 2024-12-27 RX ORDER — HEPARIN SODIUM 1000 [USP'U]/ML
2500 INJECTION, SOLUTION INTRAVENOUS; SUBCUTANEOUS EVERY 12 HOURS
Refills: 0 | Status: DISCONTINUED | OUTPATIENT
Start: 2024-12-27 | End: 2024-12-31

## 2024-12-27 RX ORDER — TORSEMIDE 10 MG/1
20 TABLET ORAL ONCE
Refills: 0 | Status: COMPLETED | OUTPATIENT
Start: 2024-12-27 | End: 2024-12-27

## 2024-12-27 RX ADMIN — Medication 20 MILLIGRAM(S): at 21:32

## 2024-12-27 RX ADMIN — KETOROLAC TROMETHAMINE 15 MILLIGRAM(S): 30 INJECTION INTRAMUSCULAR; INTRAVENOUS at 21:32

## 2024-12-27 RX ADMIN — CHLORHEXIDINE GLUCONATE 1 APPLICATION(S): 1.2 RINSE ORAL at 05:47

## 2024-12-27 RX ADMIN — KETOROLAC TROMETHAMINE 15 MILLIGRAM(S): 30 INJECTION INTRAMUSCULAR; INTRAVENOUS at 12:30

## 2024-12-27 RX ADMIN — PANTOPRAZOLE 40 MILLIGRAM(S): 40 TABLET, DELAYED RELEASE ORAL at 07:07

## 2024-12-27 RX ADMIN — Medication 1 APPLICATION(S): at 17:52

## 2024-12-27 RX ADMIN — LATANOPROST 1 DROP(S): 50 SOLUTION OPHTHALMIC at 21:47

## 2024-12-27 RX ADMIN — Medication 20 MILLIGRAM(S): at 15:18

## 2024-12-27 RX ADMIN — Medication 500 MILLIGRAM(S): at 05:46

## 2024-12-27 RX ADMIN — MAGNESIUM SULFATE 25 GRAM(S): 500 INJECTION, SOLUTION INTRAMUSCULAR; INTRAVENOUS at 18:29

## 2024-12-27 RX ADMIN — Medication 500 MILLIGRAM(S): at 17:52

## 2024-12-27 RX ADMIN — HEPARIN SODIUM 2500 UNIT(S): 1000 INJECTION, SOLUTION INTRAVENOUS; SUBCUTANEOUS at 17:52

## 2024-12-27 RX ADMIN — MILRINONE LACTATE 3.38 MICROGRAM(S)/KG/MIN: 1 INJECTION, SOLUTION INTRAVENOUS at 07:06

## 2024-12-27 RX ADMIN — POTASSIUM CHLORIDE 50 MILLIEQUIVALENT(S): 600 TABLET, FILM COATED, EXTENDED RELEASE ORAL at 18:25

## 2024-12-27 RX ADMIN — HEPARIN SODIUM 2500 UNIT(S): 1000 INJECTION, SOLUTION INTRAVENOUS; SUBCUTANEOUS at 05:47

## 2024-12-27 RX ADMIN — Medication 17 GRAM(S): at 11:34

## 2024-12-27 RX ADMIN — SENNOSIDES 2 TABLET(S): 8.6 TABLET, FILM COATED ORAL at 21:32

## 2024-12-27 RX ADMIN — NICARDIPINE HYDROCHLORIDE 25 MG/HR: 2.5 INJECTION INTRAVENOUS at 07:06

## 2024-12-27 RX ADMIN — Medication 81 MILLIGRAM(S): at 11:32

## 2024-12-27 RX ADMIN — NICARDIPINE HYDROCHLORIDE 25 MG/HR: 2.5 INJECTION INTRAVENOUS at 04:22

## 2024-12-27 RX ADMIN — KETOROLAC TROMETHAMINE 15 MILLIGRAM(S): 30 INJECTION INTRAMUSCULAR; INTRAVENOUS at 11:33

## 2024-12-27 RX ADMIN — POTASSIUM CHLORIDE 50 MILLIEQUIVALENT(S): 600 TABLET, FILM COATED, EXTENDED RELEASE ORAL at 18:31

## 2024-12-27 RX ADMIN — Medication 1 APPLICATION(S): at 05:46

## 2024-12-27 RX ADMIN — ACETAMINOPHEN 1000 MILLIGRAM(S): 80 SOLUTION/ DROPS ORAL at 05:46

## 2024-12-27 RX ADMIN — ACETAMINOPHEN 1000 MILLIGRAM(S): 80 SOLUTION/ DROPS ORAL at 06:46

## 2024-12-27 RX ADMIN — TORSEMIDE 20 MILLIGRAM(S): 10 TABLET ORAL at 01:10

## 2024-12-27 NOTE — PROGRESS NOTE ADULT - SUBJECTIVE AND OBJECTIVE BOX
INTERVAL COURSE  POD#4   AVR/ AA replacement / JIM Cryomaze   Off  since am/ Mil 0.25/ normal cardiac indices and lactate  Labs in good range and stable otherwise, still poor IS requiring HF  OOB/ Neuro intact     ICU Vital Signs Last 24 Hrs  T(C): 37.2 (27 Dec 2024 05:01), Max: 37.2 (27 Dec 2024 05:01)  T(F): 99 (27 Dec 2024 05:01), Max: 99 (27 Dec 2024 05:01)  HR: 73 (27 Dec 2024 09:00) (71 - 81)  ABP: 104/54 (27 Dec 2024 09:00) (99/48 - 123/57)  ABP(mean): 71 (27 Dec 2024 09:00) (64 - 83)  RR: 28 (27 Dec 2024 09:00) (17 - 33)  SpO2: 100% (27 Dec 2024 09:00) (94% - 100%)    O2 Parameters below as of 27 Dec 2024 09:00  Patient On (Oxygen Delivery Method): nasal cannula, high flow  O2 Flow (L/min): 30  O2 Concentration (%): 40    Adult Advanced Hemodynamics Last 24 Hrs  CVP(mm Hg): 14 (27 Dec 2024 09:00) (8 - 24)  CO: 4.4 (27 Dec 2024 05:00) (3.3 - 4.4)  CI: 2.3 (27 Dec 2024 09:00) (2.3 - 3.2)  PA: 36/13 (27 Dec 2024 09:00) (32/10 - 50/22)  PA(mean): 21 (27 Dec 2024 09:00) (19 - 33)  SVR: 1107 (27 Dec 2024 05:00) (1078 - 1382)  SVRI: 1980 (27 Dec 2024 09:00) (1487 - 1980)  ABG - ( 27 Dec 2024 09:43 )  pH, Arterial: 7.46  pH, Blood: x     /  pCO2: 29    /  pO2: 155   / HCO3: 21    / Base Excess: -2.1  /  SaO2: 99.9      Daily   I&O's Summary    26 Dec 2024 07:01  -  27 Dec 2024 07:00  --------------------------------------------------------  IN: 1640.1 mL / OUT: 2645 mL / NET: -1004.9 mL      PHYSICAL EXAM  General: A&Ox 3; NAD  Respiratory: Diminished basilar breath sounds   Cardiovascular: Regular rhythm/rate  Gastrointestinal: Soft; NTND   Extremities: WWP; Mild generalized edema   Neurological:  CNII-XII grossly intact; no obvious focal deficits    LABS/IMAGING/EKG/ETC  Reviewed.

## 2024-12-27 NOTE — PROGRESS NOTE ADULT - SUBJECTIVE AND OBJECTIVE BOX
CTICU  CRITICAL  CARE  attending     Hand off received 					   Pertinent clinical, laboratory, radiographic, hemodynamic, echocardiographic, respiratory data, microbiologic data and chart were reviewed and analyzed frequently throughout the course of the day  Patient seen and examined with CTS/ SH attending at bedside  Pt is a 59yr old female with PMH HTN, HLD, NSTEMI, pAfib on eliquis, HFpEF, admitted for surgical mgmt of AVR. Pt underwent Ascending aorta replacement (26mm graft), AV replacement (23mm Bio; EF nl) with cryomaze and JIM occlusion (CBP 113min, XC 84min) with Dr. Gray 24. Arrived intubated on  3, primacor .25, cardene 2.5.  increased to 5. Initial lactate 1.5. Did not tolerate CPAP overnight. : Weakness in L side. Stroke Code Called. CTH and CTA/H/N with no LVO or hemorrhage or acute infarct. CTCAP also done. EEG placed in CTICU. Lactate hernandez to 5.1 but cleared. TTE with nl biventricular function, severe biatrial enlargement, no pericardial effusion. Extubated overnight. Mental status improving. :  decreased to 2.5. Given 1 pRBC. :  weaned to 1.25. :  off. Off HFNC.       FAMILY HISTORY:  PAST MEDICAL & SURGICAL HISTORY:  HTN (hypertension  HLD (hyperlipidemia)  GERD (gastroesophageal reflux disease)  Anemia  MI (myocardial infarction)  Paroxysmal atrial fibrillation  Severe aortic regurgitation  Cyst of left eyelid  Cyst of the left eye removed in the past month, pt still currently taking opthalmic medications        Patient is a 59y old  Female who presents with a chief complaint of AVR.      14 system review was unremarkable    Vital signs, hemodynamic and respiratory parameters were reviewed from the bedside nursing flowsheet.  ICU Vital Signs Last 24 Hrs  T(C): 36.8 (27 Dec 2024 17:19), Max: 37.2 (27 Dec 2024 05:01)  T(F): 98.3 (27 Dec 2024 17:19), Max: 99 (27 Dec 2024 05:01)  HR: 69 (27 Dec 2024 20:00) (68 - 81)  BP: --  BP(mean): --  ABP: 134/62 (27 Dec 2024 20:00) (101/55 - 146/71)  ABP(mean): 87 (27 Dec 2024 20:00) (68 - 95)  RR: 29 (27 Dec 2024 20:00) (17 - 30)  SpO2: 97% (27 Dec 2024 20:00) (97% - 100%)    O2 Parameters below as of 27 Dec 2024 20:00  Patient On (Oxygen Delivery Method): nasal cannula w/ humidification  O2 Flow (L/min): 2        Adult Advanced Hemodynamics Last 24 Hrs  CVP(mm Hg): 65 (27 Dec 2024 20:00) (3 - 186)  CVP(cm H2O): --  CO: 4.4 (27 Dec 2024 05:00) (4 - 4.4)  CI: 2.3 (27 Dec 2024 09:00) (2.3 - 3.2)  PA: 38/14 (27 Dec 2024 10:00) (32/10 - 50/22)  PA(mean): 24 (27 Dec 2024 10:00) (19 - 33)  PCWP: --  SVR: 1107 (27 Dec 2024 05:00) (1078 - 1152)  SVRI:  (27 Dec 2024 09:00) (1487 - 1980)  PVR: --  PVRI: --, ABG - ( 27 Dec 2024 17:44 )  pH, Arterial: 7.48  pH, Blood: x     /  pCO2: 31    /  pO2: 109   / HCO3: 23    / Base Excess: 0.4   /  SaO2: 98.8                Intake and output was reviewed and the fluid balance was calculated  Daily     Daily   I&O's Summary    26 Dec 2024 07:01  -  27 Dec 2024 07:00  --------------------------------------------------------  IN: 1640.1 mL / OUT: 2645 mL / NET: -1004.9 mL    27 Dec 2024 07:01  -  27 Dec 2024 20:47  --------------------------------------------------------  IN: 694.2 mL / OUT: 1675 mL / NET: -980.8 mL        All lines and drain sites were assessed  [facility]      POCT Blood Glucose.: 149 mg/dL (27 Dec 2024 18:20)    Neuro: sitting up, following commands  HEENT: mmm  Heart: s1 s2  Lungs:   Abdomen: soft, nt nd  Extremities: wwp    Lines:  RIj Cordis   R radial arterial line     Tubes:  Med x 3      labs  CBC Full  -  ( 27 Dec 2024 14:31 )  WBC Count : 8.78 K/uL  RBC Count : 3.04 M/uL  Hemoglobin : 9.5 g/dL  Hematocrit : 28.2 %  Platelet Count - Automated : 95 K/uL  Mean Cell Volume : 92.8 fl  Mean Cell Hemoglobin : 31.3 pg  Mean Cell Hemoglobin Concentration : 33.7 g/dL  Auto Neutrophil # : x  Auto Lymphocyte # : x  Auto Monocyte # : x  Auto Eosinophil # : x  Auto Basophil # : x  Auto Neutrophil % : x  Auto Lymphocyte % : x  Auto Monocyte % : x  Auto Eosinophil % : x  Auto Basophil % : x        132[L]  |  98  |  21  ----------------------------<  109[H]  3.8   |  23  |  0.81    Ca    8.5      27 Dec 2024 14:31  Phos  3.8       Mg     1.9         TPro  6.1  /  Alb  3.4  /  TBili  0.5  /  DBili  x   /  AST  39  /  ALT  6[L]  /  AlkPhos  79      PT/INR - ( 27 Dec 2024 14:31 )   PT: 11.3 sec;   INR: 0.98          PTT - ( 27 Dec 2024 14:31 )  PTT:30.1 sec  The current medications were reviewed   MEDICATIONS  (STANDING):  ascorbic acid 500 milliGRAM(s) Oral two times a day  aspirin enteric coated 81 milliGRAM(s) Oral daily  bisacodyl Suppository 10 milliGRAM(s) Rectal once  chlorhexidine 2% Cloths 1 Application(s) Topical daily  dextrose 5%. 1000 milliLiter(s) (100 mL/Hr) IV Continuous <Continuous>  dextrose 5%. 1000 milliLiter(s) (50 mL/Hr) IV Continuous <Continuous>  dextrose 50% Injectable 25 Gram(s) IV Push once  dextrose 50% Injectable 12.5 Gram(s) IV Push once  dextrose 50% Injectable 25 Gram(s) IV Push once  furosemide   Injectable 20 milliGRAM(s) IV Push every 8 hours  glucagon  Injectable 1 milliGRAM(s) IntraMuscular once  heparin   Injectable 2500 Unit(s) SubCutaneous every 12 hours  insulin lispro (ADMELOG) corrective regimen sliding scale   SubCutaneous Before meals and at bedtime  latanoprost 0.005% Ophthalmic Solution 1 Drop(s) Both EYES at bedtime  milrinone Infusion 0.25 MICROgram(s)/kG/Min (3.38 mL/Hr) IV Continuous <Continuous>  mupirocin 2% Ointment 1 Application(s) Both Nostrils two times a day  niCARdipine Infusion 5 mG/Hr (25 mL/Hr) IV Continuous <Continuous>  pantoprazole    Tablet 40 milliGRAM(s) Oral before breakfast  polyethylene glycol 3350 17 Gram(s) Oral daily  senna 2 Tablet(s) Oral at bedtime  sodium chloride 0.9%. 1000 milliLiter(s) (10 mL/Hr) IV Continuous <Continuous>    MEDICATIONS  (PRN):  acetaminophen     Tablet .. 1000 milliGRAM(s) Oral every 6 hours PRN Moderate Pain (4 - 6)  dextrose Oral Gel 15 Gram(s) Oral once PRN Blood Glucose LESS THAN 70 milliGRAM(s)/deciliter  ketorolac   Injectable 15 milliGRAM(s) IV Push every 8 hours PRN Severe Pain (7 - 10)  melatonin 5 milliGRAM(s) Oral at bedtime PRN Sleep      Assessment/Plan:  59yr old female with PMH HTN, HLD, NSTEMI, pAfib on eliquis, HFpEF, admitted for surgical mgmt of AVR.     POD4 Ascending aorta replacement (26mm graft), AV replacement (23mm Bio; EF nl) with cryomaze and JIM occlusion (CBP 113min, XC 84min, [place],  24)  Cardiogenic shock on primacor-wean as tolerated  Trend end organ perfusion markers  Cardene for Systolic goal <130  Stroke Neurology following  Likely MRI later  EEG negative  Acute post operative anemia due to acute blood loss-trend H/H  Thrombocytopenia-monitor  Hyperglycemia-insulin per protocol  Aspirin  Diet as tolerated  Replete lytes prn  Monitor CT output  GI/DVT PPX  Bowel Regimen  Pain control  OOB with PT  Close hemodynamic, ventilatory and drain monitoring and management per post op routine  Monitor for arrhythmias and monitor parameters for organ perfusion  Monitor neurologic status  Head of the bed should remain elevated to 45 deg   Chest PT and IS will be encouraged  Monitor adequacy of oxygenation and ventilation and attempt to wean oxygen  Antibiotic regimen will be tailored to the clinical, laboratory and microbiologic data  Nutritional goals will be met using po eventually, ensure adequate caloric intake and monitor the same  Stress ulcer and VTE prophylaxis will be achieved    Glycemic control is satisfactory  Electrolytes have been repleted as necessary and wound care has been carried out   Pain control has been achieved.   Aggressive physical therapy and early mobility and ambulation goals will be met   The family was updated about the course and plan.    CRITICAL CARE TIME personally provided by me  in evaluation and management, reassessments, review and interpretation of labs and x-rays, ventilator and hemodynamic management, formulating a plan and coordinating care: __30_ MIN.  Time does not include procedural time.    CTICU ATTENDING     					  Parish Malave MD

## 2024-12-27 NOTE — PROGRESS NOTE ADULT - ASSESSMENT
59 F w/PMHx HTN, HLD (not on statin 2/2 elevated LFT's), NSTEMI 2011 (no PCI), ? TIA in  (no deficits), pAfib (on Eliquis), HFpEF (EF 55-60%) and aortic regurgitation admitted for preop testing for AVR w/[facility].   S/p aortic valve and ascending aorta replacement ( 23 mm Bio/ 26 mm Graft)/ JIM occlusion and cryomaze  Normal EF     Arrived intubated on dual inotrop  Post op course c/b left sided weakness requiring stroke eval and vEEG  A/p  neuorlogic deficit improved, stroke work up neg with unremarkable vEEG finding--continue to monitor  HDS/ on and off low dose cardene to maintain sBP < 120  mmhg given thin aorta   Off  with normal perfusion markers, CVP ~ 14 and PAPs in low 30s  Will start to wean off Mil in the next 24 hours closely monitoring perfusion labs, no need for advanced hemodynamics- to dc swan   NFNC down to few L NC/ Continue IS and Chest pt   POCUS with large right sided pleural effusion, moderate sized left -- will continue diuretics for neg IO/ serial sonos/ likely needs pigtail starting with right side   Good glycemic control/dash diet -- advance diet as tolerated   Continue ASA/ICU ppx with IS  OOB and mobilizing as tolerated     Requires ICU care as still on inotrop infusion/ further mobilization and post op recovery from cardiogenic shock     ATTENDING: I have personally and independently provided 105 min of critical care services. This excludes any time spent on separate procedures or teaching.

## 2024-12-28 LAB
ALBUMIN SERPL ELPH-MCNC: 3.4 G/DL — SIGNIFICANT CHANGE UP (ref 3.3–5)
ALBUMIN SERPL ELPH-MCNC: 3.4 G/DL — SIGNIFICANT CHANGE UP (ref 3.3–5)
ALBUMIN SERPL ELPH-MCNC: 3.6 G/DL — SIGNIFICANT CHANGE UP (ref 3.3–5)
ALP SERPL-CCNC: 104 U/L — SIGNIFICANT CHANGE UP (ref 40–120)
ALP SERPL-CCNC: 107 U/L — SIGNIFICANT CHANGE UP (ref 40–120)
ALP SERPL-CCNC: 95 U/L — SIGNIFICANT CHANGE UP (ref 40–120)
ALT FLD-CCNC: 7 U/L — LOW (ref 10–45)
ALT FLD-CCNC: 8 U/L — LOW (ref 10–45)
ALT FLD-CCNC: 8 U/L — LOW (ref 10–45)
ANION GAP SERPL CALC-SCNC: 10 MMOL/L — SIGNIFICANT CHANGE UP (ref 5–17)
ANION GAP SERPL CALC-SCNC: 9 MMOL/L — SIGNIFICANT CHANGE UP (ref 5–17)
ANION GAP SERPL CALC-SCNC: 9 MMOL/L — SIGNIFICANT CHANGE UP (ref 5–17)
APTT BLD: 31.3 SEC — SIGNIFICANT CHANGE UP (ref 24.5–35.6)
APTT BLD: 32.1 SEC — SIGNIFICANT CHANGE UP (ref 24.5–35.6)
APTT BLD: 33.8 SEC — SIGNIFICANT CHANGE UP (ref 24.5–35.6)
AST SERPL-CCNC: 32 U/L — SIGNIFICANT CHANGE UP (ref 10–40)
AST SERPL-CCNC: 34 U/L — SIGNIFICANT CHANGE UP (ref 10–40)
AST SERPL-CCNC: 35 U/L — SIGNIFICANT CHANGE UP (ref 10–40)
BASOPHILS # BLD AUTO: 0.02 K/UL — SIGNIFICANT CHANGE UP (ref 0–0.2)
BASOPHILS NFR BLD AUTO: 0.3 % — SIGNIFICANT CHANGE UP (ref 0–2)
BILIRUB SERPL-MCNC: 0.4 MG/DL — SIGNIFICANT CHANGE UP (ref 0.2–1.2)
BILIRUB SERPL-MCNC: 0.5 MG/DL — SIGNIFICANT CHANGE UP (ref 0.2–1.2)
BILIRUB SERPL-MCNC: 0.5 MG/DL — SIGNIFICANT CHANGE UP (ref 0.2–1.2)
BUN SERPL-MCNC: 20 MG/DL — SIGNIFICANT CHANGE UP (ref 7–23)
BUN SERPL-MCNC: 24 MG/DL — HIGH (ref 7–23)
BUN SERPL-MCNC: 24 MG/DL — HIGH (ref 7–23)
CALCIUM SERPL-MCNC: 8.3 MG/DL — LOW (ref 8.4–10.5)
CALCIUM SERPL-MCNC: 8.6 MG/DL — SIGNIFICANT CHANGE UP (ref 8.4–10.5)
CALCIUM SERPL-MCNC: 8.7 MG/DL — SIGNIFICANT CHANGE UP (ref 8.4–10.5)
CHLORIDE SERPL-SCNC: 93 MMOL/L — LOW (ref 96–108)
CHLORIDE SERPL-SCNC: 94 MMOL/L — LOW (ref 96–108)
CHLORIDE SERPL-SCNC: 98 MMOL/L — SIGNIFICANT CHANGE UP (ref 96–108)
CO2 SERPL-SCNC: 27 MMOL/L — SIGNIFICANT CHANGE UP (ref 22–31)
CO2 SERPL-SCNC: 27 MMOL/L — SIGNIFICANT CHANGE UP (ref 22–31)
CO2 SERPL-SCNC: 28 MMOL/L — SIGNIFICANT CHANGE UP (ref 22–31)
CREAT SERPL-MCNC: 0.64 MG/DL — SIGNIFICANT CHANGE UP (ref 0.5–1.3)
CREAT SERPL-MCNC: 0.71 MG/DL — SIGNIFICANT CHANGE UP (ref 0.5–1.3)
CREAT SERPL-MCNC: 0.83 MG/DL — SIGNIFICANT CHANGE UP (ref 0.5–1.3)
EGFR: 101 ML/MIN/1.73M2 — SIGNIFICANT CHANGE UP
EGFR: 81 ML/MIN/1.73M2 — SIGNIFICANT CHANGE UP
EGFR: 97 ML/MIN/1.73M2 — SIGNIFICANT CHANGE UP
EOSINOPHIL # BLD AUTO: 0.15 K/UL — SIGNIFICANT CHANGE UP (ref 0–0.5)
EOSINOPHIL # BLD AUTO: 0.19 K/UL — SIGNIFICANT CHANGE UP (ref 0–0.5)
EOSINOPHIL # BLD AUTO: 0.23 K/UL — SIGNIFICANT CHANGE UP (ref 0–0.5)
EOSINOPHIL NFR BLD AUTO: 2.1 % — SIGNIFICANT CHANGE UP (ref 0–6)
EOSINOPHIL NFR BLD AUTO: 2.8 % — SIGNIFICANT CHANGE UP (ref 0–6)
EOSINOPHIL NFR BLD AUTO: 3.6 % — SIGNIFICANT CHANGE UP (ref 0–6)
GAS PNL BLDA: SIGNIFICANT CHANGE UP
GAS PNL BLDA: SIGNIFICANT CHANGE UP
GAS PNL BLDV: SIGNIFICANT CHANGE UP
GAS PNL BLDV: SIGNIFICANT CHANGE UP
GLUCOSE SERPL-MCNC: 105 MG/DL — HIGH (ref 70–99)
GLUCOSE SERPL-MCNC: 128 MG/DL — HIGH (ref 70–99)
GLUCOSE SERPL-MCNC: 168 MG/DL — HIGH (ref 70–99)
HCT VFR BLD CALC: 26.5 % — LOW (ref 34.5–45)
HCT VFR BLD CALC: 27.6 % — LOW (ref 34.5–45)
HCT VFR BLD CALC: 28.3 % — LOW (ref 34.5–45)
HGB BLD-MCNC: 8.9 G/DL — LOW (ref 11.5–15.5)
HGB BLD-MCNC: 9.4 G/DL — LOW (ref 11.5–15.5)
HGB BLD-MCNC: 9.5 G/DL — LOW (ref 11.5–15.5)
IMM GRANULOCYTES NFR BLD AUTO: 0.8 % — SIGNIFICANT CHANGE UP (ref 0–0.9)
IMM GRANULOCYTES NFR BLD AUTO: 1.2 % — HIGH (ref 0–0.9)
IMM GRANULOCYTES NFR BLD AUTO: 1.4 % — HIGH (ref 0–0.9)
INR BLD: 0.97 — SIGNIFICANT CHANGE UP (ref 0.85–1.16)
INR BLD: 1 — SIGNIFICANT CHANGE UP (ref 0.85–1.16)
INR BLD: 1.02 — SIGNIFICANT CHANGE UP (ref 0.85–1.16)
LACTATE SERPL-SCNC: 1.1 MMOL/L — SIGNIFICANT CHANGE UP (ref 0.5–2)
LACTATE SERPL-SCNC: 1.7 MMOL/L — SIGNIFICANT CHANGE UP (ref 0.5–2)
LYMPHOCYTES # BLD AUTO: 1.33 K/UL — SIGNIFICANT CHANGE UP (ref 1–3.3)
LYMPHOCYTES # BLD AUTO: 1.46 K/UL — SIGNIFICANT CHANGE UP (ref 1–3.3)
LYMPHOCYTES # BLD AUTO: 1.83 K/UL — SIGNIFICANT CHANGE UP (ref 1–3.3)
LYMPHOCYTES # BLD AUTO: 19.9 % — SIGNIFICANT CHANGE UP (ref 13–44)
LYMPHOCYTES # BLD AUTO: 20.2 % — SIGNIFICANT CHANGE UP (ref 13–44)
LYMPHOCYTES # BLD AUTO: 28.7 % — SIGNIFICANT CHANGE UP (ref 13–44)
MAGNESIUM SERPL-MCNC: 1.8 MG/DL — SIGNIFICANT CHANGE UP (ref 1.6–2.6)
MAGNESIUM SERPL-MCNC: 2.4 MG/DL — SIGNIFICANT CHANGE UP (ref 1.6–2.6)
MAGNESIUM SERPL-MCNC: 2.4 MG/DL — SIGNIFICANT CHANGE UP (ref 1.6–2.6)
MCHC RBC-ENTMCNC: 30.9 PG — SIGNIFICANT CHANGE UP (ref 27–34)
MCHC RBC-ENTMCNC: 31 PG — SIGNIFICANT CHANGE UP (ref 27–34)
MCHC RBC-ENTMCNC: 32 PG — SIGNIFICANT CHANGE UP (ref 27–34)
MCHC RBC-ENTMCNC: 33.2 G/DL — SIGNIFICANT CHANGE UP (ref 32–36)
MCHC RBC-ENTMCNC: 33.6 G/DL — SIGNIFICANT CHANGE UP (ref 32–36)
MCHC RBC-ENTMCNC: 34.4 G/DL — SIGNIFICANT CHANGE UP (ref 32–36)
MCV RBC AUTO: 92.3 FL — SIGNIFICANT CHANGE UP (ref 80–100)
MCV RBC AUTO: 92.9 FL — SIGNIFICANT CHANGE UP (ref 80–100)
MCV RBC AUTO: 93.1 FL — SIGNIFICANT CHANGE UP (ref 80–100)
MONOCYTES # BLD AUTO: 0.75 K/UL — SIGNIFICANT CHANGE UP (ref 0–0.9)
MONOCYTES # BLD AUTO: 0.79 K/UL — SIGNIFICANT CHANGE UP (ref 0–0.9)
MONOCYTES # BLD AUTO: 0.89 K/UL — SIGNIFICANT CHANGE UP (ref 0–0.9)
MONOCYTES NFR BLD AUTO: 11.8 % — SIGNIFICANT CHANGE UP (ref 2–14)
MONOCYTES NFR BLD AUTO: 11.8 % — SIGNIFICANT CHANGE UP (ref 2–14)
MONOCYTES NFR BLD AUTO: 12.3 % — SIGNIFICANT CHANGE UP (ref 2–14)
NEUTROPHILS # BLD AUTO: 3.49 K/UL — SIGNIFICANT CHANGE UP (ref 1.8–7.4)
NEUTROPHILS # BLD AUTO: 4.28 K/UL — SIGNIFICANT CHANGE UP (ref 1.8–7.4)
NEUTROPHILS # BLD AUTO: 4.61 K/UL — SIGNIFICANT CHANGE UP (ref 1.8–7.4)
NEUTROPHILS NFR BLD AUTO: 54.8 % — SIGNIFICANT CHANGE UP (ref 43–77)
NEUTROPHILS NFR BLD AUTO: 63.7 % — SIGNIFICANT CHANGE UP (ref 43–77)
NEUTROPHILS NFR BLD AUTO: 64 % — SIGNIFICANT CHANGE UP (ref 43–77)
NRBC # BLD: 0 /100 WBCS — SIGNIFICANT CHANGE UP (ref 0–0)
PHOSPHATE SERPL-MCNC: 3.5 MG/DL — SIGNIFICANT CHANGE UP (ref 2.5–4.5)
PHOSPHATE SERPL-MCNC: 3.9 MG/DL — SIGNIFICANT CHANGE UP (ref 2.5–4.5)
PHOSPHATE SERPL-MCNC: 4.1 MG/DL — SIGNIFICANT CHANGE UP (ref 2.5–4.5)
PLATELET # BLD AUTO: 111 K/UL — LOW (ref 150–400)
PLATELET # BLD AUTO: 122 K/UL — LOW (ref 150–400)
PLATELET # BLD AUTO: 151 K/UL — SIGNIFICANT CHANGE UP (ref 150–400)
POTASSIUM SERPL-MCNC: 3.6 MMOL/L — SIGNIFICANT CHANGE UP (ref 3.5–5.3)
POTASSIUM SERPL-MCNC: 3.7 MMOL/L — SIGNIFICANT CHANGE UP (ref 3.5–5.3)
POTASSIUM SERPL-MCNC: 4.7 MMOL/L — SIGNIFICANT CHANGE UP (ref 3.5–5.3)
POTASSIUM SERPL-SCNC: 3.6 MMOL/L — SIGNIFICANT CHANGE UP (ref 3.5–5.3)
POTASSIUM SERPL-SCNC: 3.7 MMOL/L — SIGNIFICANT CHANGE UP (ref 3.5–5.3)
POTASSIUM SERPL-SCNC: 4.7 MMOL/L — SIGNIFICANT CHANGE UP (ref 3.5–5.3)
PROT SERPL-MCNC: 6.1 G/DL — SIGNIFICANT CHANGE UP (ref 6–8.3)
PROT SERPL-MCNC: 6.3 G/DL — SIGNIFICANT CHANGE UP (ref 6–8.3)
PROT SERPL-MCNC: 6.4 G/DL — SIGNIFICANT CHANGE UP (ref 6–8.3)
PROTHROM AB SERPL-ACNC: 11.3 SEC — SIGNIFICANT CHANGE UP (ref 9.9–13.4)
PROTHROM AB SERPL-ACNC: 11.5 SEC — SIGNIFICANT CHANGE UP (ref 9.9–13.4)
PROTHROM AB SERPL-ACNC: 11.9 SEC — SIGNIFICANT CHANGE UP (ref 9.9–13.4)
RBC # BLD: 2.87 M/UL — LOW (ref 3.8–5.2)
RBC # BLD: 2.97 M/UL — LOW (ref 3.8–5.2)
RBC # BLD: 3.04 M/UL — LOW (ref 3.8–5.2)
RBC # FLD: 14.6 % — HIGH (ref 10.3–14.5)
RBC # FLD: 14.7 % — HIGH (ref 10.3–14.5)
RBC # FLD: 14.7 % — HIGH (ref 10.3–14.5)
SODIUM SERPL-SCNC: 130 MMOL/L — LOW (ref 135–145)
SODIUM SERPL-SCNC: 131 MMOL/L — LOW (ref 135–145)
SODIUM SERPL-SCNC: 134 MMOL/L — LOW (ref 135–145)
WBC # BLD: 6.37 K/UL — SIGNIFICANT CHANGE UP (ref 3.8–10.5)
WBC # BLD: 6.69 K/UL — SIGNIFICANT CHANGE UP (ref 3.8–10.5)
WBC # BLD: 7.23 K/UL — SIGNIFICANT CHANGE UP (ref 3.8–10.5)
WBC # FLD AUTO: 6.37 K/UL — SIGNIFICANT CHANGE UP (ref 3.8–10.5)
WBC # FLD AUTO: 6.69 K/UL — SIGNIFICANT CHANGE UP (ref 3.8–10.5)
WBC # FLD AUTO: 7.23 K/UL — SIGNIFICANT CHANGE UP (ref 3.8–10.5)

## 2024-12-28 PROCEDURE — 99291 CRITICAL CARE FIRST HOUR: CPT

## 2024-12-28 PROCEDURE — 99292 CRITICAL CARE ADDL 30 MIN: CPT

## 2024-12-28 PROCEDURE — 71045 X-RAY EXAM CHEST 1 VIEW: CPT | Mod: 26

## 2024-12-28 RX ORDER — MILRINONE LACTATE 1 MG/ML
0.12 INJECTION, SOLUTION INTRAVENOUS
Qty: 20 | Refills: 0 | Status: DISCONTINUED | OUTPATIENT
Start: 2024-12-28 | End: 2024-12-29

## 2024-12-28 RX ORDER — MILRINONE LACTATE 1 MG/ML
0.12 INJECTION, SOLUTION INTRAVENOUS
Qty: 20 | Refills: 0 | Status: DISCONTINUED | OUTPATIENT
Start: 2024-12-28 | End: 2024-12-28

## 2024-12-28 RX ORDER — POTASSIUM CHLORIDE 600 MG/1
40 TABLET, FILM COATED, EXTENDED RELEASE ORAL ONCE
Refills: 0 | Status: COMPLETED | OUTPATIENT
Start: 2024-12-28 | End: 2024-12-28

## 2024-12-28 RX ORDER — FUROSEMIDE 20 MG
20 TABLET ORAL ONCE
Refills: 0 | Status: COMPLETED | OUTPATIENT
Start: 2024-12-28 | End: 2024-12-28

## 2024-12-28 RX ORDER — HYDRALAZINE HYDROCHLORIDE 10 MG/1
10 TABLET ORAL THREE TIMES A DAY
Refills: 0 | Status: DISCONTINUED | OUTPATIENT
Start: 2024-12-28 | End: 2024-12-30

## 2024-12-28 RX ORDER — MAGNESIUM SULFATE 500 MG/ML
2 INJECTION, SOLUTION INTRAMUSCULAR; INTRAVENOUS ONCE
Refills: 0 | Status: COMPLETED | OUTPATIENT
Start: 2024-12-28 | End: 2024-12-28

## 2024-12-28 RX ADMIN — POTASSIUM CHLORIDE 40 MILLIEQUIVALENT(S): 600 TABLET, FILM COATED, EXTENDED RELEASE ORAL at 12:06

## 2024-12-28 RX ADMIN — Medication 1 APPLICATION(S): at 06:16

## 2024-12-28 RX ADMIN — Medication 5 MILLIGRAM(S): at 23:25

## 2024-12-28 RX ADMIN — ACETAMINOPHEN 1000 MILLIGRAM(S): 80 SOLUTION/ DROPS ORAL at 18:09

## 2024-12-28 RX ADMIN — Medication 20 MILLIGRAM(S): at 06:16

## 2024-12-28 RX ADMIN — HYDRALAZINE HYDROCHLORIDE 10 MILLIGRAM(S): 10 TABLET ORAL at 21:15

## 2024-12-28 RX ADMIN — HEPARIN SODIUM 2500 UNIT(S): 1000 INJECTION, SOLUTION INTRAVENOUS; SUBCUTANEOUS at 17:10

## 2024-12-28 RX ADMIN — PANTOPRAZOLE 40 MILLIGRAM(S): 40 TABLET, DELAYED RELEASE ORAL at 06:18

## 2024-12-28 RX ADMIN — Medication 1 APPLICATION(S): at 17:11

## 2024-12-28 RX ADMIN — Medication 500 MILLIGRAM(S): at 17:10

## 2024-12-28 RX ADMIN — Medication 81 MILLIGRAM(S): at 11:14

## 2024-12-28 RX ADMIN — CHLORHEXIDINE GLUCONATE 1 APPLICATION(S): 1.2 RINSE ORAL at 06:17

## 2024-12-28 RX ADMIN — HYDRALAZINE HYDROCHLORIDE 10 MILLIGRAM(S): 10 TABLET ORAL at 13:20

## 2024-12-28 RX ADMIN — HEPARIN SODIUM 2500 UNIT(S): 1000 INJECTION, SOLUTION INTRAVENOUS; SUBCUTANEOUS at 06:15

## 2024-12-28 RX ADMIN — KETOROLAC TROMETHAMINE 15 MILLIGRAM(S): 30 INJECTION INTRAMUSCULAR; INTRAVENOUS at 10:30

## 2024-12-28 RX ADMIN — MAGNESIUM SULFATE 25 GRAM(S): 500 INJECTION, SOLUTION INTRAMUSCULAR; INTRAVENOUS at 12:04

## 2024-12-28 RX ADMIN — Medication 20 MILLIGRAM(S): at 18:55

## 2024-12-28 RX ADMIN — Medication 17 GRAM(S): at 11:14

## 2024-12-28 RX ADMIN — LATANOPROST 1 DROP(S): 50 SOLUTION OPHTHALMIC at 21:19

## 2024-12-28 RX ADMIN — KETOROLAC TROMETHAMINE 15 MILLIGRAM(S): 30 INJECTION INTRAMUSCULAR; INTRAVENOUS at 09:17

## 2024-12-28 RX ADMIN — Medication 2: at 12:02

## 2024-12-28 RX ADMIN — POTASSIUM CHLORIDE 40 MILLIEQUIVALENT(S): 600 TABLET, FILM COATED, EXTENDED RELEASE ORAL at 06:16

## 2024-12-28 RX ADMIN — ACETAMINOPHEN 1000 MILLIGRAM(S): 80 SOLUTION/ DROPS ORAL at 17:12

## 2024-12-28 RX ADMIN — Medication 500 MILLIGRAM(S): at 06:16

## 2024-12-28 NOTE — PROGRESS NOTE ADULT - SUBJECTIVE AND OBJECTIVE BOX
CTICU  CRITICAL  CARE  attending     Hand off received 					   Pertinent clinical, laboratory, radiographic, hemodynamic, echocardiographic, respiratory data, microbiologic data and chart were reviewed and analyzed frequently throughout the course of the day and night            58 year old Yi speaking female with history of HTN, HLD, H/O statin intolerance (not on statin 2/2 elevated LFT's), Anemia, NSTEMI 2011 (no PCI), ? TIA in 2011 (no deficits), paroxysmal Afib (on Eliquis), HFpEF (EF 55-60%), gallstones.  She was evaluated for SOB.  ECHO: Severe AI  LHC: Non obstructive CAD.    S/P AVR, [facility] [place], LAAO with Dr. Gray on 12/23        FAMILY HISTORY:  PAST MEDICAL & SURGICAL HISTORY:  HTN (hypertension)  HLD (hyperlipidemia)  GERD (gastroesophageal reflux disease)  Anemia  MI (myocardial infarction)  Paroxysmal atrial fibrillation  Severe aortic regurgitation  Cyst of left eyelid  Cyst of the left eye removed in the past month, pt still currently taking opthalmic medications        14 system review was unremarkable    Vital signs, hemodynamic and respiratory parameters were reviewed from the bedside nursing flow sheet.  ICU Vital Signs Last 24 Hrs  T(C): 36.3 (28 Dec 2024 22:08), Max: 36.7 (28 Dec 2024 09:50)  T(F): 97.4 (28 Dec 2024 22:08), Max: 98.1 (28 Dec 2024 17:31)  HR: 90 (28 Dec 2024 22:00) (62 - 102)  BP: 193/107 (28 Dec 2024 21:00) (193/107 - 193/107)  BP(mean): 139 (28 Dec 2024 21:00) (139 - 139)  ABP: 162/87 (28 Dec 2024 22:00) (101/57 - 176/101)  ABP(mean): 112 (28 Dec 2024 22:00) (72 - 125)  RR: 28 (28 Dec 2024 22:00) (13 - 29)  SpO2: 100% (28 Dec 2024 22:00) (95% - 100%)    O2 Parameters below as of 28 Dec 2024 22:00  Patient On (Oxygen Delivery Method): nasal cannula w/ humidification  O2 Flow (L/min): 2        Adult Advanced Hemodynamics Last 24 Hrs  CVP(mm Hg): 18 (28 Dec 2024 18:00) (6 - 214)  CVP(cm H2O): --  CO: --  CI: --  PA: --  PA(mean): --  PCWP: --  SVR: --  SVRI: --  PVR: --  PVRI: --, ABG - ( 28 Dec 2024 18:06 )  pH, Arterial: 7.47  pH, Blood: x     /  pCO2: 27    /  pO2: 96    / HCO3: 20    / Base Excess: -2.6  /  SaO2: 98.9                Intake and output was reviewed and the fluid balance was calculated  Daily     Daily   I&O's Summary    27 Dec 2024 07:01  -  28 Dec 2024 07:00  --------------------------------------------------------  IN: 983.2 mL / OUT: 3475 mL / NET: -2491.8 mL    28 Dec 2024 07:01  -  28 Dec 2024 23:01  --------------------------------------------------------  IN: 926.1 mL / OUT: 1815 mL / NET: -888.9 mL            Neuro: No change in the mental status from the baseline.   Neck: No JVD.  CVS: S1, S2, No S3.  Lungs: Diminished air entry at the bases bilaterally.  Abd: Soft. No tenderness. + Bowel sounds.  Vascular: + DP/PT.  Extremities: No edema.  Lymphatic: Normal.  Skin: No abnormalities.      labs  CBC Full  -  ( 28 Dec 2024 17:07 )  WBC Count : 7.23 K/uL  RBC Count : 3.04 M/uL  Hemoglobin : 9.4 g/dL  Hematocrit : 28.3 %  Platelet Count - Automated : 151 K/uL  Mean Cell Volume : 93.1 fl  Mean Cell Hemoglobin : 30.9 pg  Mean Cell Hemoglobin Concentration : 33.2 g/dL  Auto Neutrophil # : 4.61 K/uL  Auto Lymphocyte # : 1.46 K/uL  Auto Monocyte # : 0.89 K/uL  Auto Eosinophil # : 0.15 K/uL  Auto Basophil # : 0.02 K/uL  Auto Neutrophil % : 63.7 %  Auto Lymphocyte % : 20.2 %  Auto Monocyte % : 12.3 %  Auto Eosinophil % : 2.1 %  Auto Basophil % : 0.3 %    12-28    130[L]  |  93[L]  |  24[H]  ----------------------------<  128[H]  4.7   |  27  |  0.83    Ca    8.3[L]      28 Dec 2024 17:07  Phos  3.9     12-28  Mg     2.4     12-28    TPro  6.4  /  Alb  3.6  /  TBili  0.4  /  DBili  x   /  AST  35  /  ALT  8[L]  /  AlkPhos  107  12-28    PT/INR - ( 28 Dec 2024 17:07 )   PT: 11.3 sec;   INR: 0.97          PTT - ( 28 Dec 2024 17:07 )  PTT:31.3 sec  The current medications were reviewed   MEDICATIONS  (STANDING):  aspirin enteric coated 81 milliGRAM(s) Oral daily  bisacodyl Suppository 10 milliGRAM(s) Rectal once  chlorhexidine 2% Cloths 1 Application(s) Topical daily  dextrose 5%. 1000 milliLiter(s) (100 mL/Hr) IV Continuous <Continuous>  dextrose 5%. 1000 milliLiter(s) (50 mL/Hr) IV Continuous <Continuous>  dextrose 50% Injectable 25 Gram(s) IV Push once  dextrose 50% Injectable 12.5 Gram(s) IV Push once  dextrose 50% Injectable 25 Gram(s) IV Push once  glucagon  Injectable 1 milliGRAM(s) IntraMuscular once  heparin   Injectable 2500 Unit(s) SubCutaneous every 12 hours  hydrALAZINE 10 milliGRAM(s) Oral three times a day  insulin lispro (ADMELOG) corrective regimen sliding scale   SubCutaneous Before meals and at bedtime  latanoprost 0.005% Ophthalmic Solution 1 Drop(s) Both EYES at bedtime  milrinone Infusion 0.125 MICROgram(s)/kG/Min (1.69 mL/Hr) IV Continuous <Continuous>  pantoprazole    Tablet 40 milliGRAM(s) Oral before breakfast  polyethylene glycol 3350 17 Gram(s) Oral daily  senna 2 Tablet(s) Oral at bedtime  sodium chloride 0.9%. 1000 milliLiter(s) (10 mL/Hr) IV Continuous <Continuous>    MEDICATIONS  (PRN):  acetaminophen     Tablet .. 1000 milliGRAM(s) Oral every 6 hours PRN Moderate Pain (4 - 6)  dextrose Oral Gel 15 Gram(s) Oral once PRN Blood Glucose LESS THAN 70 milliGRAM(s)/deciliter  melatonin 5 milliGRAM(s) Oral at bedtime PRN Sleep              59 year old  Female admitted with AI  S/P AVR in the setting of compromised LV function.  S/P Replacement of ascending aorta  S/P cryomaze and JIM occlusion  Postoperative course complicated by hypokalemia, hypophosphatemia, left sided weakness, mild encephalopathy.   CT HEAD:  No acute intracranial hemorrhage, mass effect or large demarcated territorial infarction  CT PERFUSION: Normal CT perfusion.  CTA NECK: No evidence of significant stenosis or occlusion.  CTA HEAD: No large  vessel occlusion or significant stenosis. 2 mm right MCA bifurcation aneurysm.  PA catheter has been removed.  IV dobutamine has been turned off.   Hemodynamically stable.  Good oxygenation.  Fair urine out put.        My plan includes :  D/C milrinone  Afterload reduction with hydralazine  Resume Entresto  Statin Rx  Close hemodynamic monitoring.  Monitor for arrhythmias and monitor parameters for organ perfusion  Monitor neurologic status  Monitor renal function.  Head of the bed should remain elevated to 45 deg .   Chest PT and IS will be encouraged  Monitor adequacy of oxygenation   Nutritional goals will be met using po eventually , ensure adequate caloric intake and monitor the same  Stress ulcer and VTE prophylaxis will be achieved    Glycemic control is satisfactory  Electrolytes have been repleted as necessary and wound care has been carried out. Pain control has been achieved.   Aggressive physical therapy and early mobility and ambulation goals will be met   The family was updated about the course and plan  CRITICAL CARE TIME SPENT in evaluation and management, reassessments, review and interpretation of labs and x-rays, hemodynamic management, formulating a plan and coordinating care: ___30____ MIN.  Time does not include procedural time.  CTICU ATTENDING     					    Alexander Noble MD

## 2024-12-28 NOTE — PROGRESS NOTE ADULT - SUBJECTIVE AND OBJECTIVE BOX
CTICU  CRITICAL  CARE  attending     Hand off received 	    CHIEF COMPLAINT :    cardiogenic shock  s/p open heart surgery  				   Pertinent clinical, laboratory, radiographic, hemodynamic, echocardiographic, respiratory data, microbiologic data and chart were reviewed and analyzed frequently throughout the course of the day and night  Patient seen and examined with CTS/ SH attending at bedside    INTERVAL HISTORY    received on primacor at 0.125 mcg/k/m      Pt is a 59y , Female, HEALTH ISSUES - PROBLEM Dx:      , FAMILY HISTORY:  PAST MEDICAL & SURGICAL HISTORY:  HTN (hypertension)      HLD (hyperlipidemia)      GERD (gastroesophageal reflux disease)      Anemia      MI (myocardial infarction)      Paroxysmal atrial fibrillation      Severe aortic regurgitation      Cyst of left eyelid  Cyst of the left eye removed in the past month, pt still currently taking opthalmic medications        Patient is a 59y old  Female who presents with a chief complaint of AVR (27 Dec 2024 20:45)      14 system review was unremarkable    Vital signs, hemodynamic and respiratory parameters were reviewed from the bedside nursing flowsheet.  ICU Vital Signs Last 24 Hrs  T(C): 36.7 (28 Dec 2024 14:25), Max: 36.8 (27 Dec 2024 17:19)  T(F): 98 (28 Dec 2024 14:25), Max: 98.3 (27 Dec 2024 17:19)  HR: 96 (28 Dec 2024 15:20) (62 - 98)  BP: --  BP(mean): --  ABP: 143/76 (28 Dec 2024 15:20) (101/57 - 154/79)  ABP(mean): 97 (28 Dec 2024 15:20) (68 - 107)  RR: 27 (28 Dec 2024 15:00) (13 - 29)  SpO2: 95% (28 Dec 2024 15:20) (95% - 100%)    O2 Parameters below as of 28 Dec 2024 15:20  Patient On (Oxygen Delivery Method): room air          Adult Advanced Hemodynamics Last 24 Hrs  CVP(mm Hg): 214 (28 Dec 2024 15:20) (6 - 214)  CVP(cm H2O): --  CO: --  CI: --  PA: --  PA(mean): --  PCWP: --  SVR: --  SVRI: --  PVR: --  PVRI: --, ABG - ( 28 Dec 2024 10:08 )  pH, Arterial: 7.49  pH, Blood: x     /  pCO2: 37    /  pO2: 129   / HCO3: 28    / Base Excess: 4.7   /  SaO2: 99.3                Intake and output was reviewed and the fluid balance was calculated  Daily     Daily   I&O's Summary    27 Dec 2024 07:01  -  28 Dec 2024 07:00  --------------------------------------------------------  IN: 983.2 mL / OUT: 3475 mL / NET: -2491.8 mL    28 Dec 2024 07:01  -  28 Dec 2024 15:41  --------------------------------------------------------  IN: 329.3 mL / OUT: 1200 mL / NET: -870.7 mL        All lines and drain sites were assessed  [facility]      POCT Blood Glucose.: 166 mg/dL (28 Dec 2024 11:16)    No acute change in mental status  Auscultation of the chest reveals equal bs  Abdomen is soft  Extremities are warm and well perfused  Wounds appear clean and unremarkable  Antibiotics are periop    labs  CBC Full  -  ( 28 Dec 2024 09:55 )  WBC Count : 6.69 K/uL  RBC Count : 2.97 M/uL  Hemoglobin : 9.5 g/dL  Hematocrit : 27.6 %  Platelet Count - Automated : 122 K/uL  Mean Cell Volume : 92.9 fl  Mean Cell Hemoglobin : 32.0 pg  Mean Cell Hemoglobin Concentration : 34.4 g/dL  Auto Neutrophil # : 4.28 K/uL  Auto Lymphocyte # : 1.33 K/uL  Auto Monocyte # : 0.79 K/uL  Auto Eosinophil # : 0.19 K/uL  Auto Basophil # : 0.02 K/uL  Auto Neutrophil % : 64.0 %  Auto Lymphocyte % : 19.9 %  Auto Monocyte % : 11.8 %  Auto Eosinophil % : 2.8 %  Auto Basophil % : 0.3 %    12-28    131[L]  |  94[L]  |  20  ----------------------------<  168[H]  3.6   |  28  |  0.64    Ca    8.6      28 Dec 2024 09:55  Phos  3.5     12-28  Mg     1.8     12-28    TPro  6.3  /  Alb  3.4  /  TBili  0.5  /  DBili  x   /  AST  34  /  ALT  8[L]  /  AlkPhos  104  12-28    PT/INR - ( 28 Dec 2024 09:55 )   PT: 11.9 sec;   INR: 1.02          PTT - ( 28 Dec 2024 09:55 )  PTT:33.8 sec  The current medications were reviewed   MEDICATIONS  (STANDING):  ascorbic acid 500 milliGRAM(s) Oral two times a day  aspirin enteric coated 81 milliGRAM(s) Oral daily  bisacodyl Suppository 10 milliGRAM(s) Rectal once  chlorhexidine 2% Cloths 1 Application(s) Topical daily  dextrose 5%. 1000 milliLiter(s) (50 mL/Hr) IV Continuous <Continuous>  dextrose 5%. 1000 milliLiter(s) (100 mL/Hr) IV Continuous <Continuous>  dextrose 50% Injectable 25 Gram(s) IV Push once  dextrose 50% Injectable 12.5 Gram(s) IV Push once  dextrose 50% Injectable 25 Gram(s) IV Push once  glucagon  Injectable 1 milliGRAM(s) IntraMuscular once  heparin   Injectable 2500 Unit(s) SubCutaneous every 12 hours  hydrALAZINE 10 milliGRAM(s) Oral three times a day  insulin lispro (ADMELOG) corrective regimen sliding scale   SubCutaneous Before meals and at bedtime  latanoprost 0.005% Ophthalmic Solution 1 Drop(s) Both EYES at bedtime  mupirocin 2% Ointment 1 Application(s) Both Nostrils two times a day  niCARdipine Infusion 5 mG/Hr (25 mL/Hr) IV Continuous <Continuous>  pantoprazole    Tablet 40 milliGRAM(s) Oral before breakfast  polyethylene glycol 3350 17 Gram(s) Oral daily  senna 2 Tablet(s) Oral at bedtime  sodium chloride 0.9%. 1000 milliLiter(s) (10 mL/Hr) IV Continuous <Continuous>    MEDICATIONS  (PRN):  acetaminophen     Tablet .. 1000 milliGRAM(s) Oral every 6 hours PRN Moderate Pain (4 - 6)  dextrose Oral Gel 15 Gram(s) Oral once PRN Blood Glucose LESS THAN 70 milliGRAM(s)/deciliter  melatonin 5 milliGRAM(s) Oral at bedtime PRN Sleep       PROBLEM LIST/ ASSESSMENT:  HEALTH ISSUES - PROBLEM Dx:      ,   Patient is a 59y old  Female who presents with a chief complaint of AVR (27 Dec 2024 20:45)     s/p cardiac surgery      Cardiogenic shock on ionotropy    Acute blood loss anemia with relative hypotension treated with > 1 unit PC    Acute post operative pulmonary insufficiency ruled in due to hypoxemia, O2 sats < 91% on RA treated with HFNC            My plan includes :    wean off primacor    start bb if tolerates staying off primacor   close hemodynamic, ventilatory and drain monitoring and management per post op routine    Monitor for arrhythmias and monitor parameters for organ perfusion  beta blockade not administered due to hemodynamic instability and bradycardia  monitor neurologic status  Head of the bed should remain elevated to 45 deg .   chest PT and IS will be encouraged  monitor adequacy of oxygenation and ventilation and attempt to wean oxygen  antibiotic regimen will be tailored to the clinical, laboratory and microbiologic data  Nutritional goals will be met using po eventually , ensure adequate caloric intake and montior the same  Stress ulcer and VTE prophylaxis will be achieved    Glycemic control is satisfactory  Electrolytes have been repleted as necessary and wound care has been carried out. Pain control has been achieved.   agressive physical therapy and early mobility and ambulation goals will be met   The family was updated about the course and plan  CRITICAL CARE TIME Upon my evaluation, this patient had a high probability of imminent or life-threatening deterioration due to the above problems which required my direct attention, intervention, and personal management.  I have personally provided 110 minutes of critical care time exclusive of time spent on separately billable procedures. Time included review of laboratory data, radiology results, discussion with consultants, and monitoring for potential decompensation. Interventions were performed as documented abovepersonally provided by me  in evaluation and management, reassessments, review and interpretation of labs and x-rays, ventilator and hemodynamic management, formulating a plan and coordinating care. Time spent was non routine post-operarive caRE and included multiple and repeated evaluations at the bedside  Time does not include procedural time.    CTICU ATTENDING     					    Zia Morgan MD

## 2024-12-29 LAB
ALBUMIN SERPL ELPH-MCNC: 3.3 G/DL — SIGNIFICANT CHANGE UP (ref 3.3–5)
ALBUMIN SERPL ELPH-MCNC: 3.4 G/DL — SIGNIFICANT CHANGE UP (ref 3.3–5)
ALP SERPL-CCNC: 102 U/L — SIGNIFICANT CHANGE UP (ref 40–120)
ALP SERPL-CCNC: 112 U/L — SIGNIFICANT CHANGE UP (ref 40–120)
ALT FLD-CCNC: 10 U/L — SIGNIFICANT CHANGE UP (ref 10–45)
ALT FLD-CCNC: 8 U/L — LOW (ref 10–45)
ANION GAP SERPL CALC-SCNC: 11 MMOL/L — SIGNIFICANT CHANGE UP (ref 5–17)
ANION GAP SERPL CALC-SCNC: 7 MMOL/L — SIGNIFICANT CHANGE UP (ref 5–17)
APTT BLD: 33.3 SEC — SIGNIFICANT CHANGE UP (ref 24.5–35.6)
APTT BLD: 35.1 SEC — SIGNIFICANT CHANGE UP (ref 24.5–35.6)
AST SERPL-CCNC: 31 U/L — SIGNIFICANT CHANGE UP (ref 10–40)
AST SERPL-CCNC: 32 U/L — SIGNIFICANT CHANGE UP (ref 10–40)
BASOPHILS # BLD AUTO: 0 K/UL — SIGNIFICANT CHANGE UP (ref 0–0.2)
BASOPHILS # BLD AUTO: 0 K/UL — SIGNIFICANT CHANGE UP (ref 0–0.2)
BASOPHILS NFR BLD AUTO: 0 % — SIGNIFICANT CHANGE UP (ref 0–2)
BASOPHILS NFR BLD AUTO: 0 % — SIGNIFICANT CHANGE UP (ref 0–2)
BILIRUB SERPL-MCNC: 0.4 MG/DL — SIGNIFICANT CHANGE UP (ref 0.2–1.2)
BILIRUB SERPL-MCNC: 0.5 MG/DL — SIGNIFICANT CHANGE UP (ref 0.2–1.2)
BLD GP AB SCN SERPL QL: NEGATIVE — SIGNIFICANT CHANGE UP
BUN SERPL-MCNC: 24 MG/DL — HIGH (ref 7–23)
BUN SERPL-MCNC: 27 MG/DL — HIGH (ref 7–23)
CALCIUM SERPL-MCNC: 8.3 MG/DL — LOW (ref 8.4–10.5)
CALCIUM SERPL-MCNC: 8.8 MG/DL — SIGNIFICANT CHANGE UP (ref 8.4–10.5)
CHLORIDE SERPL-SCNC: 92 MMOL/L — LOW (ref 96–108)
CHLORIDE SERPL-SCNC: 94 MMOL/L — LOW (ref 96–108)
CO2 SERPL-SCNC: 27 MMOL/L — SIGNIFICANT CHANGE UP (ref 22–31)
CO2 SERPL-SCNC: 27 MMOL/L — SIGNIFICANT CHANGE UP (ref 22–31)
CREAT SERPL-MCNC: 0.75 MG/DL — SIGNIFICANT CHANGE UP (ref 0.5–1.3)
CREAT SERPL-MCNC: 0.84 MG/DL — SIGNIFICANT CHANGE UP (ref 0.5–1.3)
EGFR: 80 ML/MIN/1.73M2 — SIGNIFICANT CHANGE UP
EGFR: 91 ML/MIN/1.73M2 — SIGNIFICANT CHANGE UP
EOSINOPHIL # BLD AUTO: 0.11 K/UL — SIGNIFICANT CHANGE UP (ref 0–0.5)
EOSINOPHIL # BLD AUTO: 0.16 K/UL — SIGNIFICANT CHANGE UP (ref 0–0.5)
EOSINOPHIL NFR BLD AUTO: 1.8 % — SIGNIFICANT CHANGE UP (ref 0–6)
EOSINOPHIL NFR BLD AUTO: 2.6 % — SIGNIFICANT CHANGE UP (ref 0–6)
GAS PNL BLDA: SIGNIFICANT CHANGE UP
GLUCOSE SERPL-MCNC: 139 MG/DL — HIGH (ref 70–99)
GLUCOSE SERPL-MCNC: 140 MG/DL — HIGH (ref 70–99)
HCT VFR BLD CALC: 25.7 % — LOW (ref 34.5–45)
HCT VFR BLD CALC: 30.1 % — LOW (ref 34.5–45)
HGB BLD-MCNC: 8.7 G/DL — LOW (ref 11.5–15.5)
HGB BLD-MCNC: 9.9 G/DL — LOW (ref 11.5–15.5)
INR BLD: 1.01 — SIGNIFICANT CHANGE UP (ref 0.85–1.16)
INR BLD: 1.03 — SIGNIFICANT CHANGE UP (ref 0.85–1.16)
LACTATE SERPL-SCNC: 1.2 MMOL/L — SIGNIFICANT CHANGE UP (ref 0.5–2)
LACTATE SERPL-SCNC: 2.6 MMOL/L — HIGH (ref 0.5–2)
LYMPHOCYTES # BLD AUTO: 1.21 K/UL — SIGNIFICANT CHANGE UP (ref 1–3.3)
LYMPHOCYTES # BLD AUTO: 1.78 K/UL — SIGNIFICANT CHANGE UP (ref 1–3.3)
LYMPHOCYTES # BLD AUTO: 19.1 % — SIGNIFICANT CHANGE UP (ref 13–44)
LYMPHOCYTES # BLD AUTO: 28.7 % — SIGNIFICANT CHANGE UP (ref 13–44)
MAGNESIUM SERPL-MCNC: 2.2 MG/DL — SIGNIFICANT CHANGE UP (ref 1.6–2.6)
MAGNESIUM SERPL-MCNC: 2.2 MG/DL — SIGNIFICANT CHANGE UP (ref 1.6–2.6)
MCHC RBC-ENTMCNC: 31.1 PG — SIGNIFICANT CHANGE UP (ref 27–34)
MCHC RBC-ENTMCNC: 31.3 PG — SIGNIFICANT CHANGE UP (ref 27–34)
MCHC RBC-ENTMCNC: 32.9 G/DL — SIGNIFICANT CHANGE UP (ref 32–36)
MCHC RBC-ENTMCNC: 33.9 G/DL — SIGNIFICANT CHANGE UP (ref 32–36)
MCV RBC AUTO: 92.4 FL — SIGNIFICANT CHANGE UP (ref 80–100)
MCV RBC AUTO: 94.7 FL — SIGNIFICANT CHANGE UP (ref 80–100)
MONOCYTES # BLD AUTO: 0.48 K/UL — SIGNIFICANT CHANGE UP (ref 0–0.9)
MONOCYTES # BLD AUTO: 0.55 K/UL — SIGNIFICANT CHANGE UP (ref 0–0.9)
MONOCYTES NFR BLD AUTO: 7.8 % — SIGNIFICANT CHANGE UP (ref 2–14)
MONOCYTES NFR BLD AUTO: 8.7 % — SIGNIFICANT CHANGE UP (ref 2–14)
NEUTROPHILS # BLD AUTO: 3.78 K/UL — SIGNIFICANT CHANGE UP (ref 1.8–7.4)
NEUTROPHILS # BLD AUTO: 4.23 K/UL — SIGNIFICANT CHANGE UP (ref 1.8–7.4)
NEUTROPHILS NFR BLD AUTO: 60.9 % — SIGNIFICANT CHANGE UP (ref 43–77)
NEUTROPHILS NFR BLD AUTO: 67 % — SIGNIFICANT CHANGE UP (ref 43–77)
NRBC # BLD: SIGNIFICANT CHANGE UP /100 WBCS (ref 0–0)
PHOSPHATE SERPL-MCNC: 3.4 MG/DL — SIGNIFICANT CHANGE UP (ref 2.5–4.5)
PHOSPHATE SERPL-MCNC: 4 MG/DL — SIGNIFICANT CHANGE UP (ref 2.5–4.5)
PLATELET # BLD AUTO: 144 K/UL — LOW (ref 150–400)
PLATELET # BLD AUTO: 175 K/UL — SIGNIFICANT CHANGE UP (ref 150–400)
POTASSIUM SERPL-MCNC: 4 MMOL/L — SIGNIFICANT CHANGE UP (ref 3.5–5.3)
POTASSIUM SERPL-MCNC: 4.5 MMOL/L — SIGNIFICANT CHANGE UP (ref 3.5–5.3)
POTASSIUM SERPL-SCNC: 4 MMOL/L — SIGNIFICANT CHANGE UP (ref 3.5–5.3)
POTASSIUM SERPL-SCNC: 4.5 MMOL/L — SIGNIFICANT CHANGE UP (ref 3.5–5.3)
PROT SERPL-MCNC: 6 G/DL — SIGNIFICANT CHANGE UP (ref 6–8.3)
PROT SERPL-MCNC: 6.7 G/DL — SIGNIFICANT CHANGE UP (ref 6–8.3)
PROTHROM AB SERPL-ACNC: 11.8 SEC — SIGNIFICANT CHANGE UP (ref 9.9–13.4)
PROTHROM AB SERPL-ACNC: 11.8 SEC — SIGNIFICANT CHANGE UP (ref 9.9–13.4)
RBC # BLD: 2.78 M/UL — LOW (ref 3.8–5.2)
RBC # BLD: 3.18 M/UL — LOW (ref 3.8–5.2)
RBC # FLD: 14.2 % — SIGNIFICANT CHANGE UP (ref 10.3–14.5)
RBC # FLD: 14.3 % — SIGNIFICANT CHANGE UP (ref 10.3–14.5)
RH IG SCN BLD-IMP: POSITIVE — SIGNIFICANT CHANGE UP
SODIUM SERPL-SCNC: 126 MMOL/L — LOW (ref 135–145)
SODIUM SERPL-SCNC: 132 MMOL/L — LOW (ref 135–145)
WBC # BLD: 6.21 K/UL — SIGNIFICANT CHANGE UP (ref 3.8–10.5)
WBC # BLD: 6.31 K/UL — SIGNIFICANT CHANGE UP (ref 3.8–10.5)
WBC # FLD AUTO: 6.21 K/UL — SIGNIFICANT CHANGE UP (ref 3.8–10.5)
WBC # FLD AUTO: 6.31 K/UL — SIGNIFICANT CHANGE UP (ref 3.8–10.5)

## 2024-12-29 PROCEDURE — 71045 X-RAY EXAM CHEST 1 VIEW: CPT | Mod: 26,77

## 2024-12-29 PROCEDURE — 99291 CRITICAL CARE FIRST HOUR: CPT | Mod: 25

## 2024-12-29 PROCEDURE — 76604 US EXAM CHEST: CPT | Mod: 26

## 2024-12-29 PROCEDURE — 32557 INSERT CATH PLEURA W/ IMAGE: CPT | Mod: RT

## 2024-12-29 PROCEDURE — 99292 CRITICAL CARE ADDL 30 MIN: CPT | Mod: 25

## 2024-12-29 PROCEDURE — 71045 X-RAY EXAM CHEST 1 VIEW: CPT | Mod: 26

## 2024-12-29 RX ORDER — KETOROLAC TROMETHAMINE 30 MG/ML
15 INJECTION INTRAMUSCULAR; INTRAVENOUS ONCE
Refills: 0 | Status: DISCONTINUED | OUTPATIENT
Start: 2024-12-29 | End: 2024-12-29

## 2024-12-29 RX ORDER — ACETAMINOPHEN 80 MG/.8ML
675 SOLUTION/ DROPS ORAL ONCE
Refills: 0 | Status: COMPLETED | OUTPATIENT
Start: 2024-12-29 | End: 2024-12-29

## 2024-12-29 RX ORDER — HYDRALAZINE HYDROCHLORIDE 10 MG/1
5 TABLET ORAL ONCE
Refills: 0 | Status: COMPLETED | OUTPATIENT
Start: 2024-12-29 | End: 2024-12-29

## 2024-12-29 RX ORDER — FENTANYL 75 UG/H
25 PATCH, EXTENDED RELEASE TRANSDERMAL ONCE
Refills: 0 | Status: DISCONTINUED | OUTPATIENT
Start: 2024-12-29 | End: 2024-12-29

## 2024-12-29 RX ORDER — LIDOCAINE HYDROCHLORIDE 10 MG/ML
10 INJECTION INFILTRATION; PERINEURAL ONCE
Refills: 0 | Status: COMPLETED | OUTPATIENT
Start: 2024-12-29 | End: 2024-12-29

## 2024-12-29 RX ORDER — TORSEMIDE 10 MG/1
20 TABLET ORAL DAILY
Refills: 0 | Status: DISCONTINUED | OUTPATIENT
Start: 2024-12-29 | End: 2024-12-30

## 2024-12-29 RX ORDER — METOPROLOL TARTRATE 50 MG
12.5 TABLET ORAL
Refills: 0 | Status: DISCONTINUED | OUTPATIENT
Start: 2024-12-29 | End: 2024-12-30

## 2024-12-29 RX ORDER — ACETAMINOPHEN 80 MG/.8ML
675 SOLUTION/ DROPS ORAL ONCE
Refills: 0 | Status: DISCONTINUED | OUTPATIENT
Start: 2024-12-29 | End: 2024-12-29

## 2024-12-29 RX ORDER — ACETAMINOPHEN 80 MG/.8ML
500 SOLUTION/ DROPS ORAL EVERY 6 HOURS
Refills: 0 | Status: COMPLETED | OUTPATIENT
Start: 2024-12-29 | End: 2024-12-29

## 2024-12-29 RX ORDER — SODIUM CHLORIDE 5 % 5 %
1000 INTRAVENOUS SOLUTION INTRAVENOUS
Refills: 0 | Status: DISCONTINUED | OUTPATIENT
Start: 2024-12-29 | End: 2025-01-03

## 2024-12-29 RX ADMIN — KETOROLAC TROMETHAMINE 15 MILLIGRAM(S): 30 INJECTION INTRAMUSCULAR; INTRAVENOUS at 14:30

## 2024-12-29 RX ADMIN — TORSEMIDE 20 MILLIGRAM(S): 10 TABLET ORAL at 13:32

## 2024-12-29 RX ADMIN — ACETAMINOPHEN 500 MILLIGRAM(S): 80 SOLUTION/ DROPS ORAL at 01:00

## 2024-12-29 RX ADMIN — Medication 81 MILLIGRAM(S): at 11:29

## 2024-12-29 RX ADMIN — HYDRALAZINE HYDROCHLORIDE 5 MILLIGRAM(S): 10 TABLET ORAL at 08:10

## 2024-12-29 RX ADMIN — ACETAMINOPHEN 200 MILLIGRAM(S): 80 SOLUTION/ DROPS ORAL at 00:49

## 2024-12-29 RX ADMIN — HYDRALAZINE HYDROCHLORIDE 10 MILLIGRAM(S): 10 TABLET ORAL at 21:17

## 2024-12-29 RX ADMIN — LATANOPROST 1 DROP(S): 50 SOLUTION OPHTHALMIC at 21:49

## 2024-12-29 RX ADMIN — PANTOPRAZOLE 40 MILLIGRAM(S): 40 TABLET, DELAYED RELEASE ORAL at 06:50

## 2024-12-29 RX ADMIN — HYDRALAZINE HYDROCHLORIDE 10 MILLIGRAM(S): 10 TABLET ORAL at 06:50

## 2024-12-29 RX ADMIN — FENTANYL 25 MICROGRAM(S): 75 PATCH, EXTENDED RELEASE TRANSDERMAL at 21:30

## 2024-12-29 RX ADMIN — Medication 12.5 MILLIGRAM(S): at 17:05

## 2024-12-29 RX ADMIN — LIDOCAINE HYDROCHLORIDE 10 MILLILITER(S): 10 INJECTION INFILTRATION; PERINEURAL at 21:00

## 2024-12-29 RX ADMIN — CHLORHEXIDINE GLUCONATE 1 APPLICATION(S): 1.2 RINSE ORAL at 06:35

## 2024-12-29 RX ADMIN — KETOROLAC TROMETHAMINE 15 MILLIGRAM(S): 30 INJECTION INTRAMUSCULAR; INTRAVENOUS at 13:33

## 2024-12-29 RX ADMIN — HYDRALAZINE HYDROCHLORIDE 10 MILLIGRAM(S): 10 TABLET ORAL at 13:34

## 2024-12-29 RX ADMIN — HEPARIN SODIUM 2500 UNIT(S): 1000 INJECTION, SOLUTION INTRAVENOUS; SUBCUTANEOUS at 17:05

## 2024-12-29 RX ADMIN — Medication 17 GRAM(S): at 11:29

## 2024-12-29 RX ADMIN — Medication 5 MILLIGRAM(S): at 21:17

## 2024-12-29 RX ADMIN — ACETAMINOPHEN 200 MILLIGRAM(S): 80 SOLUTION/ DROPS ORAL at 06:35

## 2024-12-29 RX ADMIN — FENTANYL 25 MICROGRAM(S): 75 PATCH, EXTENDED RELEASE TRANSDERMAL at 22:25

## 2024-12-29 RX ADMIN — ACETAMINOPHEN 270 MILLIGRAM(S): 80 SOLUTION/ DROPS ORAL at 13:33

## 2024-12-29 RX ADMIN — ACETAMINOPHEN 500 MILLIGRAM(S): 80 SOLUTION/ DROPS ORAL at 06:55

## 2024-12-29 RX ADMIN — ACETAMINOPHEN 675 MILLIGRAM(S): 80 SOLUTION/ DROPS ORAL at 14:30

## 2024-12-29 RX ADMIN — HEPARIN SODIUM 2500 UNIT(S): 1000 INJECTION, SOLUTION INTRAVENOUS; SUBCUTANEOUS at 06:50

## 2024-12-29 NOTE — PROGRESS NOTE ADULT - SUBJECTIVE AND OBJECTIVE BOX
CTICU  CRITICAL  CARE  attending     Hand off received 					   Pertinent clinical, laboratory, radiographic, hemodynamic, echocardiographic, respiratory data, microbiologic data and chart were reviewed and analyzed frequently throughout the course of the day and night  Patient seen and examined with CTS/ SH attending at bedside    Pt is a 59y , Female, HEALTH ISSUES - PROBLEM Dx:      , FAMILY HISTORY:  PAST MEDICAL & SURGICAL HISTORY:  HTN (hypertension)      HLD (hyperlipidemia)      GERD (gastroesophageal reflux disease)      Anemia      MI (myocardial infarction)      Paroxysmal atrial fibrillation      Severe aortic regurgitation      Cyst of left eyelid  Cyst of the left eye removed in the past month, pt still currently taking opthalmic medications        Patient is a 59y old  Female who presents with a chief complaint of AVR (28 Dec 2024 23:01)      14 system review was unremarkable  acute changes include acute respiratory failure  Vital signs, hemodynamic and respiratory parameters were reviewed from the bedside nursing flowsheet.  ICU Vital Signs Last 24 Hrs  T(C): 36.6 (29 Dec 2024 17:13), Max: 36.7 (28 Dec 2024 17:31)  T(F): 97.8 (29 Dec 2024 17:13), Max: 98.1 (28 Dec 2024 17:31)  HR: 71 (29 Dec 2024 17:00) (70 - 102)  BP: 117/75 (29 Dec 2024 16:00) (104/71 - 193/107)  BP(mean): 90 (29 Dec 2024 16:00) (82 - 139)  ABP: 114/70 (29 Dec 2024 05:00) (114/70 - 176/101)  ABP(mean): 85 (29 Dec 2024 05:00) (85 - 125)  RR: 25 (29 Dec 2024 17:00) (13 - 28)  SpO2: 100% (29 Dec 2024 17:00) (96% - 100%)    O2 Parameters below as of 29 Dec 2024 17:00  Patient On (Oxygen Delivery Method): room air          Adult Advanced Hemodynamics Last 24 Hrs  CVP(mm Hg): 0 (28 Dec 2024 23:00) (0 - 18)  CVP(cm H2O): --  CO: --  CI: --  PA: --  PA(mean): --  PCWP: --  SVR: --  SVRI: --  PVR: --  PVRI: --, ABG - ( 29 Dec 2024 02:44 )  pH, Arterial: 7.43  pH, Blood: x     /  pCO2: 39    /  pO2: 182   / HCO3: 26    / Base Excess: 1.5   /  SaO2: 100.0               Intake and output was reviewed and the fluid balance was calculated  Daily     Daily   I&O's Summary    28 Dec 2024 07:01  -  29 Dec 2024 07:00  --------------------------------------------------------  IN: 1028 mL / OUT: 2145 mL / NET: -1117 mL    29 Dec 2024 07:01  -  29 Dec 2024 17:22  --------------------------------------------------------  IN: 10 mL / OUT: 1850 mL / NET: -1840 mL        All lines and drain sites were assessed  [facility]      POCT Blood Glucose.: 110 mg/dL (29 Dec 2024 16:51)    No acute change in mental status  Auscultation of the chest reveals equal bs  Abdomen is soft  Extremities are warm and well perfused  Wounds appear clean and unremarkable  Antibiotics are periop    labs  CBC Full  -  ( 29 Dec 2024 09:15 )  WBC Count : 6.31 K/uL  RBC Count : 3.18 M/uL  Hemoglobin : 9.9 g/dL  Hematocrit : 30.1 %  Platelet Count - Automated : 175 K/uL  Mean Cell Volume : 94.7 fl  Mean Cell Hemoglobin : 31.1 pg  Mean Cell Hemoglobin Concentration : 32.9 g/dL  Auto Neutrophil # : 4.23 K/uL  Auto Lymphocyte # : 1.21 K/uL  Auto Monocyte # : 0.55 K/uL  Auto Eosinophil # : 0.11 K/uL  Auto Basophil # : 0.00 K/uL  Auto Neutrophil % : 67.0 %  Auto Lymphocyte % : 19.1 %  Auto Monocyte % : 8.7 %  Auto Eosinophil % : 1.8 %  Auto Basophil % : 0.0 %    12-29    132[L]  |  94[L]  |  24[H]  ----------------------------<  139[H]  4.0   |  27  |  0.75    Ca    8.8      29 Dec 2024 09:15  Phos  3.4     12-29  Mg     2.2     12-29    TPro  6.7  /  Alb  3.4  /  TBili  0.5  /  DBili  x   /  AST  31  /  ALT  10  /  AlkPhos  112  12-29    PT/INR - ( 29 Dec 2024 09:15 )   PT: 11.8 sec;   INR: 1.03          PTT - ( 29 Dec 2024 09:15 )  PTT:35.1 sec  The current medications were reviewed   MEDICATIONS  (STANDING):  aspirin enteric coated 81 milliGRAM(s) Oral daily  bisacodyl Suppository 10 milliGRAM(s) Rectal once  chlorhexidine 2% Cloths 1 Application(s) Topical daily  dextrose 5%. 1000 milliLiter(s) (100 mL/Hr) IV Continuous <Continuous>  dextrose 5%. 1000 milliLiter(s) (50 mL/Hr) IV Continuous <Continuous>  dextrose 50% Injectable 25 Gram(s) IV Push once  dextrose 50% Injectable 12.5 Gram(s) IV Push once  dextrose 50% Injectable 25 Gram(s) IV Push once  glucagon  Injectable 1 milliGRAM(s) IntraMuscular once  heparin   Injectable 2500 Unit(s) SubCutaneous every 12 hours  hydrALAZINE 10 milliGRAM(s) Oral three times a day  insulin lispro (ADMELOG) corrective regimen sliding scale   SubCutaneous Before meals and at bedtime  latanoprost 0.005% Ophthalmic Solution 1 Drop(s) Both EYES at bedtime  metoprolol tartrate 12.5 milliGRAM(s) Oral two times a day  pantoprazole    Tablet 40 milliGRAM(s) Oral before breakfast  polyethylene glycol 3350 17 Gram(s) Oral daily  senna 2 Tablet(s) Oral at bedtime  sodium chloride 0.9%. 1000 milliLiter(s) (10 mL/Hr) IV Continuous <Continuous>  sodium chloride 2% . 1000 milliLiter(s) (25 mL/Hr) IV Continuous <Continuous>  torsemide 20 milliGRAM(s) Oral daily    MEDICATIONS  (PRN):  dextrose Oral Gel 15 Gram(s) Oral once PRN Blood Glucose LESS THAN 70 milliGRAM(s)/deciliter  melatonin 5 milliGRAM(s) Oral at bedtime PRN Sleep       PROBLEM LIST/ ASSESSMENT:  HEALTH ISSUES - PROBLEM Dx:      ,   Patient is a 59y old  Female who presents with a chief complaint of AVR (28 Dec 2024 23:01)     s/p   acute changes include acute respiratory failure    My plan includes :  close hemodynamic, ventilatory and drain monitoring and management per post op routine    Monitor for arrhythmias and monitor parameters for organ perfusion  monitor neurologic status  Head of the bed should remain elevated to 45 deg .   chest PT and IS will be encouraged  monitor adequacy of oxygenation and ventilation and attempt to wean oxygen  Nutritional goals will be met using po eventually , ensure adequate caloric intake and montior the same  Stress ulcer and VTE prophylaxis will be achieved    Glycemic control is satisfactory  Electrolytes have been repleted as necessary and wound care has been carried out. Pain control has been achieved.   agressive physical therapy and early mobility and ambulation goals will be met   The family was updated about the course and plan  CRITICAL CARE TIME SPENT in evaluation and management, reassessments, review and interpretation of labs and x-rays, ventilator and hemodynamic management, formulating a plan and coordinating care: ___90____ MIN.  Time does not include procedural time.  CTICU ATTENDING     					    Zia Morgan MD                        	 CTICU  CRITICAL  CARE  attending     Hand off received 					   Pertinent clinical, laboratory, radiographic, hemodynamic, echocardiographic, respiratory data, microbiologic data and chart were reviewed and analyzed frequently throughout the course of the day and night  Patient seen and examined with CTS/ SH attending at bedside    Pt is a 59y , Female, post op day # 6 s/p AVR; replacement of ascending Aorta; Cryomaze; JIM occlusion    post op:    Inotrope/pressor support    currently:    weaned off primacor @ early am today  bilateral pleural effusions  POCUS: apx 400 R/200-250/L  no risk free window for tap      , FAMILY HISTORY:  PAST MEDICAL & SURGICAL HISTORY:  HTN (hypertension)      HLD (hyperlipidemia)      GERD (gastroesophageal reflux disease)      Anemia      MI (myocardial infarction)      Paroxysmal atrial fibrillation      Severe aortic regurgitation      Cyst of left eyelid  Cyst of the left eye removed in the past month, pt still currently taking opthalmic medications        Patient is a 59y old  Female who presents with a chief complaint of severe AI (28 Dec 2024 23:01)      14 system review limited 2/2 post op morbidity    Vital signs, hemodynamic and respiratory parameters were reviewed from the bedside nursing flowsheet.  ICU Vital Signs Last 24 Hrs  T(C): 36.6 (29 Dec 2024 17:13), Max: 36.7 (28 Dec 2024 17:31)  T(F): 97.8 (29 Dec 2024 17:13), Max: 98.1 (28 Dec 2024 17:31)  HR: 71 (29 Dec 2024 17:00) (70 - 102)  BP: 117/75 (29 Dec 2024 16:00) (104/71 - 193/107)  BP(mean): 90 (29 Dec 2024 16:00) (82 - 139)  ABP: 114/70 (29 Dec 2024 05:00) (114/70 - 176/101)  ABP(mean): 85 (29 Dec 2024 05:00) (85 - 125)  RR: 25 (29 Dec 2024 17:00) (13 - 28)  SpO2: 100% (29 Dec 2024 17:00) (96% - 100%)    O2 Parameters below as of 29 Dec 2024 17:00  Patient On (Oxygen Delivery Method): room air          Adult Advanced Hemodynamics Last 24 Hrs  CVP(mm Hg): 0 (28 Dec 2024 23:00) (0 - 18)  CVP(cm H2O): --  CO: --  CI: --  PA: --  PA(mean): --  PCWP: --  SVR: --  SVRI: --  PVR: --  PVRI: --, ABG - ( 29 Dec 2024 02:44 )  pH, Arterial: 7.43  pH, Blood: x     /  pCO2: 39    /  pO2: 182   / HCO3: 26    / Base Excess: 1.5   /  SaO2: 100.0               Intake and output was reviewed and the fluid balance was calculated  Daily     Daily   I&O's Summary    28 Dec 2024 07:01  -  29 Dec 2024 07:00  --------------------------------------------------------  IN: 1028 mL / OUT: 2145 mL / NET: -1117 mL    29 Dec 2024 07:01  -  29 Dec 2024 17:22  --------------------------------------------------------  IN: 10 mL / OUT: 1850 mL / NET: -1840 mL        All lines and drain sites were assessed  [facility]      POCT Blood Glucose.: 110 mg/dL (29 Dec 2024 16:51)    No acute change in mental status  Auscultation of the chest reveals equal bs  Abdomen is soft  Extremities are warm and well perfused  Wounds appear clean and unremarkable  Antibiotics are periop    labs  CBC Full  -  ( 29 Dec 2024 09:15 )  WBC Count : 6.31 K/uL  RBC Count : 3.18 M/uL  Hemoglobin : 9.9 g/dL  Hematocrit : 30.1 %  Platelet Count - Automated : 175 K/uL  Mean Cell Volume : 94.7 fl  Mean Cell Hemoglobin : 31.1 pg  Mean Cell Hemoglobin Concentration : 32.9 g/dL  Auto Neutrophil # : 4.23 K/uL  Auto Lymphocyte # : 1.21 K/uL  Auto Monocyte # : 0.55 K/uL  Auto Eosinophil # : 0.11 K/uL  Auto Basophil # : 0.00 K/uL  Auto Neutrophil % : 67.0 %  Auto Lymphocyte % : 19.1 %  Auto Monocyte % : 8.7 %  Auto Eosinophil % : 1.8 %  Auto Basophil % : 0.0 %    12-29    132[L]  |  94[L]  |  24[H]  ----------------------------<  139[H]  4.0   |  27  |  0.75    Ca    8.8      29 Dec 2024 09:15  Phos  3.4     12-29  Mg     2.2     12-29    TPro  6.7  /  Alb  3.4  /  TBili  0.5  /  DBili  x   /  AST  31  /  ALT  10  /  AlkPhos  112  12-29    PT/INR - ( 29 Dec 2024 09:15 )   PT: 11.8 sec;   INR: 1.03          PTT - ( 29 Dec 2024 09:15 )  PTT:35.1 sec  The current medications were reviewed   MEDICATIONS  (STANDING):  aspirin enteric coated 81 milliGRAM(s) Oral daily  bisacodyl Suppository 10 milliGRAM(s) Rectal once  chlorhexidine 2% Cloths 1 Application(s) Topical daily  dextrose 5%. 1000 milliLiter(s) (100 mL/Hr) IV Continuous <Continuous>  dextrose 5%. 1000 milliLiter(s) (50 mL/Hr) IV Continuous <Continuous>  dextrose 50% Injectable 25 Gram(s) IV Push once  dextrose 50% Injectable 12.5 Gram(s) IV Push once  dextrose 50% Injectable 25 Gram(s) IV Push once  glucagon  Injectable 1 milliGRAM(s) IntraMuscular once  heparin   Injectable 2500 Unit(s) SubCutaneous every 12 hours  hydrALAZINE 10 milliGRAM(s) Oral three times a day  insulin lispro (ADMELOG) corrective regimen sliding scale   SubCutaneous Before meals and at bedtime  latanoprost 0.005% Ophthalmic Solution 1 Drop(s) Both EYES at bedtime  metoprolol tartrate 12.5 milliGRAM(s) Oral two times a day  pantoprazole    Tablet 40 milliGRAM(s) Oral before breakfast  polyethylene glycol 3350 17 Gram(s) Oral daily  senna 2 Tablet(s) Oral at bedtime  sodium chloride 0.9%. 1000 milliLiter(s) (10 mL/Hr) IV Continuous <Continuous>  sodium chloride 2% . 1000 milliLiter(s) (25 mL/Hr) IV Continuous <Continuous>  torsemide 20 milliGRAM(s) Oral daily    MEDICATIONS  (PRN):  dextrose Oral Gel 15 Gram(s) Oral once PRN Blood Glucose LESS THAN 70 milliGRAM(s)/deciliter  melatonin 5 milliGRAM(s) Oral at bedtime PRN Sleep       PROBLEM LIST/ ASSESSMENT:  HEALTH ISSUES - PROBLEM Dx:      ,   Patient is a 59y old  Female who presents with a chief complaint of severe AI(28 Dec 2024 23:01)     s/p AVR; replacement of ascending Aorta; Cryomaze; JIM occlusion      My plan includes :    Trend lactate levels and other perfusion markers off inotrope support  Maintain MAP >65-70  Re-Sono both lung fields; consider tapping the R side before starting Eliquis for Afib  Strict sugar control    close hemodynamic, ventilatory and drain monitoring and management per post op routine    Monitor for arrhythmias and monitor parameters for organ perfusion  monitor neurologic status  Head of the bed should remain elevated to 45 deg .   chest PT and IS will be encouraged  monitor adequacy of oxygenation and ventilation and attempt to wean oxygen  Nutritional goals will be met using po eventually , ensure adequate caloric intake and montior the same  Stress ulcer and VTE prophylaxis will be achieved    Glycemic control is satisfactory  Electrolytes have been repleted as necessary and wound care has been carried out. Pain control has been achieved.   agressive physical therapy and early mobility and ambulation goals will be met   The family was updated about the course and plan  CRITICAL CARE TIME SPENT in evaluation and management, reassessments, review and interpretation of labs and x-rays, ventilator and hemodynamic management, formulating a plan and coordinating care: ___110___ MIN.  Time does not include procedural time.  CTICU ATTENDING     					    Stewart Lara MD

## 2024-12-30 LAB
ALBUMIN SERPL ELPH-MCNC: 3.5 G/DL — SIGNIFICANT CHANGE UP (ref 3.3–5)
ALBUMIN SERPL ELPH-MCNC: 3.8 G/DL — SIGNIFICANT CHANGE UP (ref 3.3–5)
ALP SERPL-CCNC: 164 U/L — HIGH (ref 40–120)
ALP SERPL-CCNC: 169 U/L — HIGH (ref 40–120)
ALT FLD-CCNC: 12 U/L — SIGNIFICANT CHANGE UP (ref 10–45)
ALT FLD-CCNC: 13 U/L — SIGNIFICANT CHANGE UP (ref 10–45)
ANION GAP SERPL CALC-SCNC: 11 MMOL/L — SIGNIFICANT CHANGE UP (ref 5–17)
ANION GAP SERPL CALC-SCNC: 11 MMOL/L — SIGNIFICANT CHANGE UP (ref 5–17)
APTT BLD: 32.8 SEC — SIGNIFICANT CHANGE UP (ref 24.5–35.6)
APTT BLD: 35.1 SEC — SIGNIFICANT CHANGE UP (ref 24.5–35.6)
AST SERPL-CCNC: 44 U/L — HIGH (ref 10–40)
AST SERPL-CCNC: 45 U/L — HIGH (ref 10–40)
BASOPHILS # BLD AUTO: 0.02 K/UL — SIGNIFICANT CHANGE UP (ref 0–0.2)
BASOPHILS # BLD AUTO: 0.02 K/UL — SIGNIFICANT CHANGE UP (ref 0–0.2)
BASOPHILS NFR BLD AUTO: 0.2 % — SIGNIFICANT CHANGE UP (ref 0–2)
BASOPHILS NFR BLD AUTO: 0.2 % — SIGNIFICANT CHANGE UP (ref 0–2)
BILIRUB SERPL-MCNC: 0.3 MG/DL — SIGNIFICANT CHANGE UP (ref 0.2–1.2)
BILIRUB SERPL-MCNC: 0.4 MG/DL — SIGNIFICANT CHANGE UP (ref 0.2–1.2)
BUN SERPL-MCNC: 27 MG/DL — HIGH (ref 7–23)
BUN SERPL-MCNC: 30 MG/DL — HIGH (ref 7–23)
CALCIUM SERPL-MCNC: 8.5 MG/DL — SIGNIFICANT CHANGE UP (ref 8.4–10.5)
CALCIUM SERPL-MCNC: 9 MG/DL — SIGNIFICANT CHANGE UP (ref 8.4–10.5)
CHLORIDE SERPL-SCNC: 91 MMOL/L — LOW (ref 96–108)
CHLORIDE SERPL-SCNC: 91 MMOL/L — LOW (ref 96–108)
CO2 SERPL-SCNC: 26 MMOL/L — SIGNIFICANT CHANGE UP (ref 22–31)
CO2 SERPL-SCNC: 30 MMOL/L — SIGNIFICANT CHANGE UP (ref 22–31)
CREAT SERPL-MCNC: 0.75 MG/DL — SIGNIFICANT CHANGE UP (ref 0.5–1.3)
CREAT SERPL-MCNC: 0.79 MG/DL — SIGNIFICANT CHANGE UP (ref 0.5–1.3)
EGFR: 86 ML/MIN/1.73M2 — SIGNIFICANT CHANGE UP
EGFR: 91 ML/MIN/1.73M2 — SIGNIFICANT CHANGE UP
EOSINOPHIL # BLD AUTO: 0.13 K/UL — SIGNIFICANT CHANGE UP (ref 0–0.5)
EOSINOPHIL # BLD AUTO: 0.15 K/UL — SIGNIFICANT CHANGE UP (ref 0–0.5)
EOSINOPHIL NFR BLD AUTO: 1.5 % — SIGNIFICANT CHANGE UP (ref 0–6)
EOSINOPHIL NFR BLD AUTO: 1.8 % — SIGNIFICANT CHANGE UP (ref 0–6)
GLUCOSE SERPL-MCNC: 110 MG/DL — HIGH (ref 70–99)
GLUCOSE SERPL-MCNC: 125 MG/DL — HIGH (ref 70–99)
HCT VFR BLD CALC: 26.9 % — LOW (ref 34.5–45)
HCT VFR BLD CALC: 28.4 % — LOW (ref 34.5–45)
HGB BLD-MCNC: 8.9 G/DL — LOW (ref 11.5–15.5)
HGB BLD-MCNC: 9.2 G/DL — LOW (ref 11.5–15.5)
IMM GRANULOCYTES NFR BLD AUTO: 1.7 % — HIGH (ref 0–0.9)
IMM GRANULOCYTES NFR BLD AUTO: 2 % — HIGH (ref 0–0.9)
INR BLD: 1.02 — SIGNIFICANT CHANGE UP (ref 0.85–1.16)
INR BLD: 1.03 — SIGNIFICANT CHANGE UP (ref 0.85–1.16)
LYMPHOCYTES # BLD AUTO: 1.23 K/UL — SIGNIFICANT CHANGE UP (ref 1–3.3)
LYMPHOCYTES # BLD AUTO: 1.43 K/UL — SIGNIFICANT CHANGE UP (ref 1–3.3)
LYMPHOCYTES # BLD AUTO: 14.6 % — SIGNIFICANT CHANGE UP (ref 13–44)
LYMPHOCYTES # BLD AUTO: 17.3 % — SIGNIFICANT CHANGE UP (ref 13–44)
MAGNESIUM SERPL-MCNC: 1.7 MG/DL — SIGNIFICANT CHANGE UP (ref 1.6–2.6)
MAGNESIUM SERPL-MCNC: 1.8 MG/DL — SIGNIFICANT CHANGE UP (ref 1.6–2.6)
MCHC RBC-ENTMCNC: 30.7 PG — SIGNIFICANT CHANGE UP (ref 27–34)
MCHC RBC-ENTMCNC: 31 PG — SIGNIFICANT CHANGE UP (ref 27–34)
MCHC RBC-ENTMCNC: 32.4 G/DL — SIGNIFICANT CHANGE UP (ref 32–36)
MCHC RBC-ENTMCNC: 33.1 G/DL — SIGNIFICANT CHANGE UP (ref 32–36)
MCV RBC AUTO: 92.8 FL — SIGNIFICANT CHANGE UP (ref 80–100)
MCV RBC AUTO: 95.6 FL — SIGNIFICANT CHANGE UP (ref 80–100)
MONOCYTES # BLD AUTO: 0.93 K/UL — HIGH (ref 0–0.9)
MONOCYTES # BLD AUTO: 1.08 K/UL — HIGH (ref 0–0.9)
MONOCYTES NFR BLD AUTO: 11 % — SIGNIFICANT CHANGE UP (ref 2–14)
MONOCYTES NFR BLD AUTO: 13 % — SIGNIFICANT CHANGE UP (ref 2–14)
NEUTROPHILS # BLD AUTO: 5.46 K/UL — SIGNIFICANT CHANGE UP (ref 1.8–7.4)
NEUTROPHILS # BLD AUTO: 5.94 K/UL — SIGNIFICANT CHANGE UP (ref 1.8–7.4)
NEUTROPHILS NFR BLD AUTO: 66 % — SIGNIFICANT CHANGE UP (ref 43–77)
NEUTROPHILS NFR BLD AUTO: 70.7 % — SIGNIFICANT CHANGE UP (ref 43–77)
NRBC # BLD: 0 /100 WBCS — SIGNIFICANT CHANGE UP (ref 0–0)
NRBC # BLD: 0 /100 WBCS — SIGNIFICANT CHANGE UP (ref 0–0)
PHOSPHATE SERPL-MCNC: 4 MG/DL — SIGNIFICANT CHANGE UP (ref 2.5–4.5)
PHOSPHATE SERPL-MCNC: 4.2 MG/DL — SIGNIFICANT CHANGE UP (ref 2.5–4.5)
PLATELET # BLD AUTO: 196 K/UL — SIGNIFICANT CHANGE UP (ref 150–400)
PLATELET # BLD AUTO: 225 K/UL — SIGNIFICANT CHANGE UP (ref 150–400)
POTASSIUM SERPL-MCNC: 4.5 MMOL/L — SIGNIFICANT CHANGE UP (ref 3.5–5.3)
POTASSIUM SERPL-MCNC: 4.5 MMOL/L — SIGNIFICANT CHANGE UP (ref 3.5–5.3)
POTASSIUM SERPL-SCNC: 4.5 MMOL/L — SIGNIFICANT CHANGE UP (ref 3.5–5.3)
POTASSIUM SERPL-SCNC: 4.5 MMOL/L — SIGNIFICANT CHANGE UP (ref 3.5–5.3)
PROT SERPL-MCNC: 6.3 G/DL — SIGNIFICANT CHANGE UP (ref 6–8.3)
PROT SERPL-MCNC: 6.6 G/DL — SIGNIFICANT CHANGE UP (ref 6–8.3)
PROTHROM AB SERPL-ACNC: 11.7 SEC — SIGNIFICANT CHANGE UP (ref 9.9–13.4)
PROTHROM AB SERPL-ACNC: 12.1 SEC — SIGNIFICANT CHANGE UP (ref 9.9–13.4)
RBC # BLD: 2.9 M/UL — LOW (ref 3.8–5.2)
RBC # BLD: 2.97 M/UL — LOW (ref 3.8–5.2)
RBC # FLD: 13.9 % — SIGNIFICANT CHANGE UP (ref 10.3–14.5)
RBC # FLD: 14.2 % — SIGNIFICANT CHANGE UP (ref 10.3–14.5)
SODIUM SERPL-SCNC: 128 MMOL/L — LOW (ref 135–145)
SODIUM SERPL-SCNC: 132 MMOL/L — LOW (ref 135–145)
WBC # BLD: 8.28 K/UL — SIGNIFICANT CHANGE UP (ref 3.8–10.5)
WBC # BLD: 8.42 K/UL — SIGNIFICANT CHANGE UP (ref 3.8–10.5)
WBC # FLD AUTO: 8.28 K/UL — SIGNIFICANT CHANGE UP (ref 3.8–10.5)
WBC # FLD AUTO: 8.42 K/UL — SIGNIFICANT CHANGE UP (ref 3.8–10.5)

## 2024-12-30 PROCEDURE — 71045 X-RAY EXAM CHEST 1 VIEW: CPT | Mod: 26,77

## 2024-12-30 PROCEDURE — 71045 X-RAY EXAM CHEST 1 VIEW: CPT | Mod: 26

## 2024-12-30 PROCEDURE — 70450 CT HEAD/BRAIN W/O DYE: CPT | Mod: 26

## 2024-12-30 PROCEDURE — 99223 1ST HOSP IP/OBS HIGH 75: CPT

## 2024-12-30 RX ORDER — METOPROLOL TARTRATE 50 MG
12.5 TABLET ORAL ONCE
Refills: 0 | Status: COMPLETED | OUTPATIENT
Start: 2024-12-30 | End: 2024-12-30

## 2024-12-30 RX ORDER — SACUBITRIL AND VALSARTAN 24; 26 MG/1; MG/1
1 TABLET, FILM COATED ORAL EVERY 12 HOURS
Refills: 0 | Status: DISCONTINUED | OUTPATIENT
Start: 2024-12-30 | End: 2024-12-31

## 2024-12-30 RX ORDER — METOPROLOL TARTRATE 50 MG
12.5 TABLET ORAL EVERY 6 HOURS
Refills: 0 | Status: DISCONTINUED | OUTPATIENT
Start: 2024-12-30 | End: 2024-12-30

## 2024-12-30 RX ORDER — ACETAMINOPHEN 80 MG/.8ML
1000 SOLUTION/ DROPS ORAL EVERY 6 HOURS
Refills: 0 | Status: DISCONTINUED | OUTPATIENT
Start: 2024-12-30 | End: 2024-12-31

## 2024-12-30 RX ORDER — SODIUM CHLORIDE 9 MG/ML
3 INJECTION, SOLUTION INTRAMUSCULAR; INTRAVENOUS; SUBCUTANEOUS EVERY 8 HOURS
Refills: 0 | Status: DISCONTINUED | OUTPATIENT
Start: 2024-12-30 | End: 2025-01-09

## 2024-12-30 RX ORDER — FUROSEMIDE 20 MG
20 TABLET ORAL ONCE
Refills: 0 | Status: COMPLETED | OUTPATIENT
Start: 2024-12-30 | End: 2024-12-30

## 2024-12-30 RX ORDER — CARVEDILOL 25 MG/1
3.12 TABLET, FILM COATED ORAL EVERY 12 HOURS
Refills: 0 | Status: DISCONTINUED | OUTPATIENT
Start: 2024-12-30 | End: 2025-01-04

## 2024-12-30 RX ORDER — OXYCODONE HCL 15 MG
5 TABLET ORAL EVERY 6 HOURS
Refills: 0 | Status: DISCONTINUED | OUTPATIENT
Start: 2024-12-30 | End: 2025-01-04

## 2024-12-30 RX ADMIN — ACETAMINOPHEN 1000 MILLIGRAM(S): 80 SOLUTION/ DROPS ORAL at 02:20

## 2024-12-30 RX ADMIN — Medication 800 MILLIGRAM(S): at 14:02

## 2024-12-30 RX ADMIN — PANTOPRAZOLE 40 MILLIGRAM(S): 40 TABLET, DELAYED RELEASE ORAL at 06:06

## 2024-12-30 RX ADMIN — ACETAMINOPHEN 1000 MILLIGRAM(S): 80 SOLUTION/ DROPS ORAL at 17:40

## 2024-12-30 RX ADMIN — CARVEDILOL 3.12 MILLIGRAM(S): 25 TABLET, FILM COATED ORAL at 19:31

## 2024-12-30 RX ADMIN — SODIUM CHLORIDE 3 MILLILITER(S): 9 INJECTION, SOLUTION INTRAMUSCULAR; INTRAVENOUS; SUBCUTANEOUS at 22:37

## 2024-12-30 RX ADMIN — Medication 5 MILLIGRAM(S): at 02:03

## 2024-12-30 RX ADMIN — Medication 5 MILLIGRAM(S): at 09:10

## 2024-12-30 RX ADMIN — LATANOPROST 1 DROP(S): 50 SOLUTION OPHTHALMIC at 22:36

## 2024-12-30 RX ADMIN — HEPARIN SODIUM 2500 UNIT(S): 1000 INJECTION, SOLUTION INTRAVENOUS; SUBCUTANEOUS at 06:06

## 2024-12-30 RX ADMIN — ACETAMINOPHEN 1000 MILLIGRAM(S): 80 SOLUTION/ DROPS ORAL at 03:04

## 2024-12-30 RX ADMIN — ACETAMINOPHEN 1000 MILLIGRAM(S): 80 SOLUTION/ DROPS ORAL at 19:36

## 2024-12-30 RX ADMIN — SACUBITRIL AND VALSARTAN 1 TABLET(S): 24; 26 TABLET, FILM COATED ORAL at 17:35

## 2024-12-30 RX ADMIN — Medication 800 MILLIGRAM(S): at 06:06

## 2024-12-30 RX ADMIN — Medication 5 MILLIGRAM(S): at 09:40

## 2024-12-30 RX ADMIN — Medication 5 MILLIGRAM(S): at 22:33

## 2024-12-30 RX ADMIN — HEPARIN SODIUM 2500 UNIT(S): 1000 INJECTION, SOLUTION INTRAVENOUS; SUBCUTANEOUS at 17:36

## 2024-12-30 RX ADMIN — Medication 5 MILLIGRAM(S): at 23:30

## 2024-12-30 RX ADMIN — Medication 12.5 MILLIGRAM(S): at 02:28

## 2024-12-30 RX ADMIN — HYDRALAZINE HYDROCHLORIDE 10 MILLIGRAM(S): 10 TABLET ORAL at 06:06

## 2024-12-30 RX ADMIN — Medication 5 MILLIGRAM(S): at 01:05

## 2024-12-30 RX ADMIN — CHLORHEXIDINE GLUCONATE 1 APPLICATION(S): 1.2 RINSE ORAL at 06:49

## 2024-12-30 RX ADMIN — Medication 81 MILLIGRAM(S): at 11:42

## 2024-12-30 RX ADMIN — TORSEMIDE 20 MILLIGRAM(S): 10 TABLET ORAL at 06:06

## 2024-12-30 RX ADMIN — Medication 17 GRAM(S): at 11:43

## 2024-12-30 RX ADMIN — Medication 20 MILLIGRAM(S): at 15:43

## 2024-12-30 NOTE — CONSULT NOTE ADULT - ASSESSMENT
59 y.o German speaking female with history of HTN, HLD (not on statin 2/2 elevated LFT's), Anemia, NSTEMI 2011 (no PCI), TIA in 2011 (no deficits), pAfib (on Eliquis), HFpEF (EF 55-60%), gallstones presented on 12/21 for severe symptomatic AI, now  s/p AVR; replacement of ascending Aorta; Cryomaze; JIM occlusion on 12/23. Course c/b stroke code on 12/24 with negative workup. Further c/b Pleural effusion s/p Chest tube on 12/29.       Imaging   TTE 12/24: Normal left and right ventricular size and systolic function. EF 63%. Mild symmetric left ventricular hypertrophy. Severe biatrial enlargement. Bioprosthetic valve is seen in the aortic position with normal function, without evidence of prosthetic regurgitation.    RHC/LHC on 6/11/24:  Coronary angiography demonstrates minor luminal irregularities. Normal HD, Severe aortic regurgitaton and aortic root dilation, Normal LV EF 55%. LVEDP 17 mmHg.     ECG/Tele:       #Severe Symptomatic AI   Patient with Hx of symptomatic AI, now s/p AVR on 12/23. Feeling much better. Suspect valvular abnormalities largely driving symptoms prior to arrival, now with improvement s/p surgery.   Home medications: Entresto 24/26, Coreg 3.125 and Lasix 20mg.   Plan:  - Reasonable to resume Home medications as tolerated with holding parameters  - Rest per Primary team  59 y.o Uzbek speaking female with history of HTN, HLD (not on statin 2/2 elevated LFT's), Anemia, NSTEMI 2011 (no PCI), TIA in 2011 (no deficits), pAfib (on Eliquis), HFpEF (EF 55-60%), gallstones presented on 12/21 for severe symptomatic AI, now  s/p AVR; replacement of ascending Aorta; Cryomaze; JIM occlusion on 12/23. Course c/b stroke code on 12/24 with negative workup. Further c/b Pleural effusion s/p Chest tube on 12/29.       Imaging   TTE 12/24: Normal left and right ventricular size and systolic function. EF 63%. Mild symmetric left ventricular hypertrophy. Severe biatrial enlargement. Bioprosthetic valve is seen in the aortic position with normal function, without evidence of prosthetic regurgitation.    RHC/LHC on 6/11/24:  Coronary angiography demonstrates minor luminal irregularities. Normal HD, Severe aortic regurgitaton and aortic root dilation, Normal LV EF 55%. LVEDP 17 mmHg.     ECG/Tele:       #Severe Symptomatic AI   Patient with Hx of symptomatic AI, now s/p AVR on 12/23. Feeling much better. Suspect valvular abnormalities largely driving symptoms prior to arrival, now with improvement s/p surgery.   Home medications: Entresto 24/26, Coreg 3.125 and Lasix 20mg.   Plan:  - Reasonable to resume Home medications as tolerated with holding parameters  - Rest per Primary team     HF will sign off at this time. Re-consult as needed 59 y.o Belarusian speaking female with history of HTN, HLD (not on statin 2/2 elevated LFT's), Anemia, NSTEMI 2011 (no PCI), TIA in 2011 (no deficits), pAfib (on Eliquis), HFpEF (EF 55-60%), gallstones presented on 12/21 for severe symptomatic AI, now  s/p AVR; replacement of ascending Aorta; Cryomaze; JIM occlusion on 12/23. Course c/b stroke code on 12/24 with negative workup. Further c/b Pleural effusion s/p Chest tube on 12/29.       Imaging   TTE 12/24: Normal left and right ventricular size and systolic function. EF 63%. Mild symmetric left ventricular hypertrophy. Severe biatrial enlargement. Bioprosthetic valve is seen in the aortic position with normal function, without evidence of prosthetic regurgitation.    RHC/LHC on 6/11/24:  Coronary angiography demonstrates minor luminal irregularities. Normal HD, Severe aortic regurgitaton and aortic root dilation, Normal LV EF 55%. LVEDP 17 mmHg.     ECG/Tele:       #Severe Symptomatic AI   Patient with Hx of symptomatic AI, now s/p AVR on 12/23. Feeling much better. Suspect valvular abnormalities largely driving symptoms prior to arrival, now with improvement s/p surgery.   Home medications: Entresto 24/26, Coreg 3.125 and Lasix 20mg.   Plan:  - Reasonable to resume Home medications as tolerated with holding parameters  - Stop Losartan/Hydral when medications resumed  - Rest per Primary team     HF will sign off at this time. Re-consult as needed

## 2024-12-30 NOTE — CONSULT NOTE ADULT - SUBJECTIVE AND OBJECTIVE BOX
HPI:  58 year old Sinhala speaking female with history of HTN, HLD (not on statin 2/2 elevated LFT's), Anemia, NSTEMI 2011 (no PCI), ? TIA in 2011 (no deficits), pAfib (on Eliquis), HFpEF (EF 55-60%), gallstones, presents today as a pre op admission for AVR, MYESHA Phan with Dr. Gray on 12/23. Pt denies CP, SOB, cough, fever, NVD on arrival to hospital.    (22 Dec 2024 14:25)    INTERVAL EVENTS:  Patient seen and evaluated at bedside. She is feeling well today, denies any pain or SOB at this time. Sitting in chair and not in distress.       PMHx:   HTN (hypertension)    HLD (hyperlipidemia)    GERD (gastroesophageal reflux disease)    Anemia    MI (myocardial infarction)    Paroxysmal atrial fibrillation    Severe aortic regurgitation        PSHx:   No significant past surgical history    Cyst of left eyelid        Allergies:  No Known Allergies      Home Meds:    Current Medications:   acetaminophen     Tablet .. 1000 milliGRAM(s) Oral every 6 hours PRN  aspirin enteric coated 81 milliGRAM(s) Oral daily  bisacodyl Suppository 10 milliGRAM(s) Rectal once  chlorhexidine 2% Cloths 1 Application(s) Topical daily  dextrose 5%. 1000 milliLiter(s) IV Continuous <Continuous>  dextrose 5%. 1000 milliLiter(s) IV Continuous <Continuous>  dextrose 50% Injectable 25 Gram(s) IV Push once  dextrose 50% Injectable 12.5 Gram(s) IV Push once  dextrose 50% Injectable 25 Gram(s) IV Push once  dextrose Oral Gel 15 Gram(s) Oral once PRN  glucagon  Injectable 1 milliGRAM(s) IntraMuscular once  heparin   Injectable 2500 Unit(s) SubCutaneous every 12 hours  hydrALAZINE 10 milliGRAM(s) Oral three times a day  insulin lispro (ADMELOG) corrective regimen sliding scale   SubCutaneous Before meals and at bedtime  latanoprost 0.005% Ophthalmic Solution 1 Drop(s) Both EYES at bedtime  melatonin 5 milliGRAM(s) Oral at bedtime PRN  metoprolol tartrate 12.5 milliGRAM(s) Oral every 6 hours  oxyCODONE    IR 5 milliGRAM(s) Oral every 6 hours PRN  pantoprazole    Tablet 40 milliGRAM(s) Oral before breakfast  polyethylene glycol 3350 17 Gram(s) Oral daily  senna 2 Tablet(s) Oral at bedtime  sodium chloride 0.9%. 1000 milliLiter(s) IV Continuous <Continuous>  sodium chloride 2% . 1000 milliLiter(s) IV Continuous <Continuous>      FAMILY HISTORY:      Social History:  Smoking History:  Alcohol Use:  Drug Use:    REVIEW OF SYSTEMS:  CONSTITUTIONAL: No fever, chills, night sweats, or fatigue  EYES: No eye pain, visual disturbances, or discharge  ENMT:  No difficulty hearing, tinnitus, vertigo; No sinus or throat pain  NECK: No pain or stiffness  BREASTS: No pain, masses, or nipple discharge  RESPIRATORY: No cough, wheezing, or hemoptysis; No shortness of breath  CARDIOVASCULAR: No chest pain, palpitations, dizziness, or leg swelling  GASTROINTESTINAL: No abdominal or epigastric pain. No nausea, vomiting, or hematemesis; No diarrhea or constipation. No melena or hematochezia.  GENITOURINARY: No dysuria, frequency, hematuria, or incontinence  NEUROLOGICAL: No headaches, memory loss, loss of strength, numbness, or tremors  SKIN: No itching, burning, rashes, or lesions   LYMPH NODES: No enlarged glands  ENDOCRINE: No heat or cold intolerance; No hair loss  MUSCULOSKELETAL: No joint pain or swelling; No muscle, back, or extremity pain  PSYCHIATRIC: No depression, anxiety, mood swings, or difficulty sleeping  HEME/LYMPH: No easy bruising, or bleeding gums  ALLERGY AND IMMUNOLOGIC: No hives or eczema      Physical Exam:  T(F): 97.8 (12-30), Max: 97.8 (12-29)  HR: 71 (12-30) (57 - 96)  BP: 134/79 (12-30) (117/75 - 176/92)  RR: 18 (12-30)  SpO2: 95% (12-30)  GENERAL: No acute distress, well-developed  HEAD:  Atraumatic, Normocephalic  ENT: EOMI, PERRLA, conjunctiva and sclera clear, Neck supple, No JVD, moist mucosa  CHEST/LUNG: Clear to auscultation bilaterally; No wheeze, equal breath sounds bilaterally   BACK: No spinal tenderness  HEART: Regular rate and rhythm; No murmurs, rubs, or gallops  ABDOMEN: Soft, Nontender, Nondistended; Bowel sounds present  EXTREMITIES:  No clubbing, cyanosis, or edema  PSYCH: Nl behavior, nl affect  NEUROLOGY: AAOx3, non-focal, cranial nerves intact  SKIN: Normal color, No rashes or lesions  LINES:    Cardiovascular Diagnostic Testing:    ECG: Personally reviewed:    Echo: Personally reviewed:    Stress Testing:    Cath:    Imaging:    CXR: Personally reviewed    Labs: Personally reviewed                        8.9    8.42  )-----------( 196      ( 30 Dec 2024 03:08 )             26.9     12-30    128[L]  |  91[L]  |  30[H]  ----------------------------<  125[H]  4.5   |  26  |  0.79    Ca    8.5      30 Dec 2024 03:08  Phos  4.2     12-30  Mg     1.8     12-30    TPro  6.3  /  Alb  3.5  /  TBili  0.3  /  DBili  x   /  AST  45[H]  /  ALT  12  /  AlkPhos  164[H]  12-30    PT/INR - ( 30 Dec 2024 03:08 )   PT: 11.7 sec;   INR: 1.02          PTT - ( 30 Dec 2024 03:08 )  PTT:32.8 sec

## 2024-12-30 NOTE — PROGRESS NOTE ADULT - ASSESSMENT
59 YO Croatian-speaking Female with PMHx of HTN, HLD (not on statin 2/2 elevated LFT's), Anemia, NSTEMI 2011 (no PCI), ?TIA in 2011 (no deficits), pAfib (on Eliquis), HFpEF (EF 55-60%), gallstones who presented to [facility] for pre-op optimization. On 12/23/24 patient underwent sternotomy for AVR and ascending aorta replacement, cryo maze and JIM appendge closure (35mm).    Plan:    Neurovascular:   -Hx of possible TIA (2011, no deficits)  -Pain well controlled with current regimen. PRN's: Tylenol and Oxycodone    Cardiovascular:   -POD7 s/p sternotomy for AVR and ascending aorta replacement, cryo maze and JIM appendage closure (35mm), EF normal on 12/23/24  -Cont ASA  -Post-op TTE done 12/24/24  -HFpEF  -HF team following, recs appreciated  -Cont Coreg, Entresto and Lasix   -Hx of pAFib  -Plan to restart Eliquis tomorrow   -Hx of HTN  -Cont BB  -Hx of HLD  -Held 2/2 elevated LFTs  -Hemodynamically stable.   -Monitor: BP, HR, tele    Respiratory:   -Oxygenating well on room air  -Encourage continued use of IS 10x/hr and frequent ambulation  -CXR: L pleural effusion    GI:  -GI PPX: Protonix  -PO Diet  -Bowel Regimen: senna and miralax     Renal / :  -Continue to monitor renal function: BUN/Cr: 27/0.75  -Monitor I/O's daily     Endocrine:    -No hx of DM or thyroid dx  -A1c: 6.2  -TSH: 3.47    Hematologic:  -Hx of anemia  -CBC: H/H- 9.2/28  -Coagulation Panel.    ID:  -Temperature: Afebrile  -CBC: WBC- 8.2    Prophylaxis:  -DVT prophylaxis with 5000 SubQ Heparin q8h.  -Continue with SCD's b/l while patient is at rest     Disposition:  -Discharge home once patient is medically ready   59 YO Croatian-speaking Female with PMHx of HTN, HLD (not on statin 2/2 elevated LFT's), Anemia, NSTEMI 2011 (no PCI), ?TIA in  (no deficits), pAfib (on Eliquis), HFpEF (EF 55-60%), gallstones who presented to [facility] for pre-op optimization. On 24 patient underwent sternotomy for AVR and ascending aorta replacement, cryo maze and JIM appendage closure (35mm). POD1 Stroke code called for lethargy, L sided weakness w/u negative. EEG for arm jerking- negative. On  and primacor. POD2 Extubated, HFNC for positive pressure.  weaned. POD3 Remained on low dose  and primacor. POD4  off, swan removed. Meds x 2 removed. weaned to NC. POD5 diuresed. Primacor weaned. POD6 diuresed. BB started. Conversion pause so pacing wires kept in. Pigtail placed for R pleural effusion. POD7 pigtail and wires removed. POCUS revealed small L pleural effusion with poor window. Transferred to [facility].     Plan:    Neurovascular:   -Left sided weakness   -Neuro recs appreciated  -CT negative for acute infarct; pending repeat CT head  -Hx of possible TIA (, no deficits)  -Pain well controlled with current regimen. PRN's: Tylenol and Oxycodone    Cardiovascular:   -POD7 s/p sternotomy for AVR and ascending aorta replacement, cryo maze and JIM appendage closure (35mm), EF normal on 24  -Cont ASA  -Post-op TTE done 24  -HFpEF  -HF team following, recs appreciated  -Cont Coreg, Entresto and Lasix   -Hx of pAFib  -Plan to restart Eliquis tomorrow   -Hx of HTN  -Cont BB  -Hx of HLD  -Held 2/2 elevated LFTs  -Hemodynamically stable.   -Monitor: BP, HR, tele    Respiratory:   -Oxygenating well on room air  -Encourage continued use of IS 10x/hr and frequent ambulation  -CXR: L pleural effusion    GI:  -GI PPX: Protonix  -PO Diet  -Bowel Regimen: senna and miralax     Renal / :  -Continue to monitor renal function: BUN/Cr: 27/0.75  -Monitor I/O's daily     Endocrine:    -No hx of DM or thyroid dx  -A1c: 6.2  -TSH: 3.47    Hematologic:  -Hx of anemia  -CBC: H/H- 9.  -Coagulation Panel.    ID:  -Temperature: Afebrile  -CBC: WBC- 8.2    Prophylaxis:  -DVT prophylaxis with 5000 SubQ Heparin q8h.  -Continue with SCD's b/l while patient is at rest     Disposition:  -Discharge home once patient is medically ready   57 YO Yoruba-speaking Female with PMHx of HTN, HLD (not on statin 2/2 elevated LFT's), Anemia, NSTEMI 2011 (no PCI), ?TIA in  (no deficits), pAfib (on Eliquis), HFpEF (EF 55-60%), gallstones who presented to [facility] for pre-op optimization. On 24 patient underwent sternotomy for AVR and ascending aorta replacement, cryo maze and JIM appendage closure (35mm). POD1 Stroke code called for lethargy, L sided weakness w/u negative. EEG for arm jerking- negative. On  and primacor. POD2 Extubated, HFNC for positive pressure.  weaned. POD3 Remained on low dose  and primacor. POD4  off, swan removed. Meds x 2 removed. weaned to NC. POD5 diuresed. Primacor weaned. POD6 diuresed. BB started. Conversion pause so pacing wires kept in. Pigtail placed for R pleural effusion. POD7 pigtail and wires removed. POCUS revealed small L pleural effusion with poor window. Transferred to [facility].     Plan:    Neurovascular:   -Left sided weakness   -Neuro recs appreciated  -CT negative for acute infarct; pending repeat CT head  -Hx of possible TIA (, no deficits)  -Pain well controlled with current regimen. PRN's: Tylenol and Oxycodone    Cardiovascular:   -POD7 s/p sternotomy for AVR and ascending aorta replacement, cryo maze and JIM appendage closure (35mm), EF normal on 24  -Cont ASA  -Post-op TTE done 24  -Remaining drains and wires removed today  -HFpEF  -HF team following, recs appreciated  -Cont Coreg, Entresto and Lasix   -Hx of pAFib  -Cont to hold Eliquis  -Hx of HTN  -Cont BB  -Hx of HLD  -Held 2/2 elevated LFTs  -Hemodynamically stable.   -Monitor: BP, HR, tele    Respiratory:   -Oxygenating well on room air  -Encourage continued use of IS 10x/hr and frequent ambulation  -CXR: L pleural effusion    GI:  -GI PPX: Protonix  -PO Diet  -Bowel Regimen: senna and miralax     Renal / :  -Continue to monitor renal function: BUN/Cr: 27/0.75  -Monitor I/O's daily     Endocrine:    -No hx of DM or thyroid dx  -A1c: 6.2  -TSH: 3.47    Hematologic:  -Hx of anemia  -CBC: H/H- 9.2/28  -Coagulation Panel.    ID:  -Temperature: Afebrile  -CBC: WBC- 8.2    Prophylaxis:  -DVT prophylaxis with 5000 SubQ Heparin q8h.  -Continue with SCD's b/l while patient is at rest     Disposition:  -Discharge home once patient is medically ready

## 2024-12-30 NOTE — PROGRESS NOTE ADULT - SUBJECTIVE AND OBJECTIVE BOX
Patient discussed on morning rounds with Dr. Gray    Operation / Date: s/p sternotomy for AVR and ascending aorta replacement, cryo maze and JIM appendge closure (35mm) on 12/23/24    SUBJECTIVE ASSESSMENT: Patient seen and examined at bedside.     Vital Signs Last 24 Hrs  T(C): 36.6 (30 Dec 2024 09:00), Max: 36.6 (29 Dec 2024 17:13)  T(F): 97.8 (30 Dec 2024 09:00), Max: 97.8 (29 Dec 2024 17:13)  HR: 69 (30 Dec 2024 14:00) (57 - 90)  BP: 117/86 (30 Dec 2024 14:00) (115/82 - 176/92)  BP(mean): 98 (30 Dec 2024 14:00) (87 - 128)  RR: 18 (30 Dec 2024 14:00) (16 - 33)  SpO2: 99% (30 Dec 2024 14:00) (92% - 100%)    Parameters below as of 30 Dec 2024 14:00  Patient On (Oxygen Delivery Method): room air    I&O's Detail    29 Dec 2024 07:01  -  30 Dec 2024 07:00  --------------------------------------------------------  IN:    Oral Fluid: 540 mL    sodium chloride 0.9%: 10 mL  Total IN: 550 mL    OUT:    Bulb (mL): 40 mL    Chest Tube (mL): 475 mL    Voided (mL): 2750 mL  Total OUT: 3265 mL    Total NET: -2715 mL    30 Dec 2024 07:01  -  30 Dec 2024 14:56  --------------------------------------------------------  IN:    Oral Fluid: 360 mL  Total IN: 360 mL    OUT:    Bulb (mL): 20 mL    Chest Tube (mL): 25 mL    Voided (mL): 1675 mL  Total OUT: 1720 mL    Total NET: -1360 mL    CHEST TUBE:    MINDA DRAIN:    EPICARDIAL WIRES:   TIE DOWNS:   CHELSEA:     PHYSICAL EXAM:  GENERAL: NAD, lying in bed comfortably  HEAD:  Atraumatic, Normocephalic  EYES: EOMI, PERRLA, conjunctiva and sclera clear  ENT: Moist mucous membranes  NECK: Supple, No JVD  CHEST/LUNG: CTAB; MSI well approximated  HEART: RRR  ABDOMEN: Soft, Nontender, Nondistended. No hepatomegally  EXTREMITIES:  2+ Peripheral Pulses, brisk capillary refill. No clubbing, cyanosis, or edema  NERVOUS SYSTEM:  Alert & Oriented X3, speech clear. No deficits     LABS:                        9.2    8.28  )-----------( 225      ( 30 Dec 2024 11:46 )             28.4     PT/INR - ( 30 Dec 2024 11:46 )   PT: 12.1 sec;   INR: 1.03        PTT - ( 30 Dec 2024 11:46 )  PTT:35.1 sec    12-30    132[L]  |  91[L]  |  27[H]  ----------------------------<  110[H]  4.5   |  30  |  0.75    Ca    9.0      30 Dec 2024 11:46  Phos  4.0     12-30  Mg     1.7     12-30    TPro  6.6  /  Alb  3.8  /  TBili  0.4  /  DBili  x   /  AST  44[H]  /  ALT  13  /  AlkPhos  169[H]  12-30    Urinalysis Basic - ( 30 Dec 2024 11:46 )    Color: x / Appearance: x / SG: x / pH: x  Gluc: 110 mg/dL / Ketone: x  / Bili: x / Urobili: x   Blood: x / Protein: x / Nitrite: x   Leuk Esterase: x / RBC: x / WBC x   Sq Epi: x / Non Sq Epi: x / Bacteria: x    MEDICATIONS  (STANDING):  aspirin enteric coated 81 milliGRAM(s) Oral daily  bisacodyl Suppository 10 milliGRAM(s) Rectal once  carvedilol 3.125 milliGRAM(s) Oral every 12 hours  chlorhexidine 2% Cloths 1 Application(s) Topical daily  dextrose 5%. 1000 milliLiter(s) (100 mL/Hr) IV Continuous <Continuous>  dextrose 5%. 1000 milliLiter(s) (50 mL/Hr) IV Continuous <Continuous>  dextrose 50% Injectable 25 Gram(s) IV Push once  dextrose 50% Injectable 12.5 Gram(s) IV Push once  dextrose 50% Injectable 25 Gram(s) IV Push once  furosemide   Injectable 20 milliGRAM(s) IV Push once  glucagon  Injectable 1 milliGRAM(s) IntraMuscular once  heparin   Injectable 2500 Unit(s) SubCutaneous every 12 hours  insulin lispro (ADMELOG) corrective regimen sliding scale   SubCutaneous Before meals and at bedtime  latanoprost 0.005% Ophthalmic Solution 1 Drop(s) Both EYES at bedtime  magnesium oxide 400 milliGRAM(s) Oral once  pantoprazole    Tablet 40 milliGRAM(s) Oral before breakfast  polyethylene glycol 3350 17 Gram(s) Oral daily  sacubitril 24 mG/valsartan 26 mG 1 Tablet(s) Oral every 12 hours  senna 2 Tablet(s) Oral at bedtime  sodium chloride 0.9%. 1000 milliLiter(s) (10 mL/Hr) IV Continuous <Continuous>  sodium chloride 2% . 1000 milliLiter(s) (25 mL/Hr) IV Continuous <Continuous>    MEDICATIONS  (PRN):  acetaminophen     Tablet .. 1000 milliGRAM(s) Oral every 6 hours PRN Temp greater or equal to 38C (100.4F), Mild Pain (1 - 3)  dextrose Oral Gel 15 Gram(s) Oral once PRN Blood Glucose LESS THAN 70 milliGRAM(s)/deciliter  melatonin 5 milliGRAM(s) Oral at bedtime PRN Sleep  oxyCODONE    IR 5 milliGRAM(s) Oral every 6 hours PRN Severe Pain (7 - 10)    RADIOLOGY & ADDITIONAL TESTS:  < from: Xray Chest 1 View-PORTABLE IMMEDIATE (Xray Chest 1 View-PORTABLE IMMEDIATE .) (12.29.24 @ 22:41) >  IMPRESSION:  Interval placement of right sided pigtail catheter which may be kinked,   correlate with output. No pneumothorax.  Near resolution of right effusion.         Patient discussed on morning rounds with Dr. Gray    Operation / Date: s/p sternotomy for AVR and ascending aorta replacement, cryo maze and JIM appendge closure (35mm) on 12/23/24    SUBJECTIVE ASSESSMENT: Patient seen and examined at bedside. Utilized [facility]  ID#[id number]. Patient c/o feeling generally weak and some pain at her incisions. Venkatesh drain removed without incident.     Vital Signs Last 24 Hrs  T(C): 36.6 (30 Dec 2024 09:00), Max: 36.6 (29 Dec 2024 17:13)  T(F): 97.8 (30 Dec 2024 09:00), Max: 97.8 (29 Dec 2024 17:13)  HR: 69 (30 Dec 2024 14:00) (57 - 90)  BP: 117/86 (30 Dec 2024 14:00) (115/82 - 176/92)  BP(mean): 98 (30 Dec 2024 14:00) (87 - 128)  RR: 18 (30 Dec 2024 14:00) (16 - 33)  SpO2: 99% (30 Dec 2024 14:00) (92% - 100%)    Parameters below as of 30 Dec 2024 14:00  Patient On (Oxygen Delivery Method): room air    I&O's Detail    29 Dec 2024 07:01  -  30 Dec 2024 07:00  --------------------------------------------------------  IN:    Oral Fluid: 540 mL    sodium chloride 0.9%: 10 mL  Total IN: 550 mL    OUT:    Bulb (mL): 40 mL    Chest Tube (mL): 475 mL    Voided (mL): 2750 mL  Total OUT: 3265 mL    Total NET: -2715 mL    30 Dec 2024 07:01  -  30 Dec 2024 14:56  --------------------------------------------------------  IN:    Oral Fluid: 360 mL  Total IN: 360 mL    OUT:    Bulb (mL): 20 mL    Chest Tube (mL): 25 mL    Voided (mL): 1675 mL  Total OUT: 1720 mL    Total NET: -1360 mL    CHEST TUBE:  None  VENKATESH DRAIN:  None  EPICARDIAL WIRES: None  TIE DOWNS: None  TRAN: None    PHYSICAL EXAM:  GENERAL: NAD, lying in bed comfortably  HEAD:  Atraumatic, Normocephalic  EYES: EOMI, PERRLA, conjunctiva and sclera clear  ENT: Moist mucous membranes  NECK: Supple, No JVD  CHEST/LUNG: CTAB; MSI well approximated with Prineo; c/d/i dressing placed over previous drain site  HEART: RRR  ABDOMEN: Soft, Nontender, Nondistended. No hepatomegally  EXTREMITIES:  2+ Peripheral Pulses, brisk capillary refill. No clubbing, cyanosis, or edema  NERVOUS SYSTEM:  Alert & Oriented X3, speech clear. No deficits     LABS:                        9.2    8.28  )-----------( 225      ( 30 Dec 2024 11:46 )             28.4     PT/INR - ( 30 Dec 2024 11:46 )   PT: 12.1 sec;   INR: 1.03        PTT - ( 30 Dec 2024 11:46 )  PTT:35.1 sec    12-30    132[L]  |  91[L]  |  27[H]  ----------------------------<  110[H]  4.5   |  30  |  0.75    Ca    9.0      30 Dec 2024 11:46  Phos  4.0     12-30  Mg     1.7     12-30    TPro  6.6  /  Alb  3.8  /  TBili  0.4  /  DBili  x   /  AST  44[H]  /  ALT  13  /  AlkPhos  169[H]  12-30    Urinalysis Basic - ( 30 Dec 2024 11:46 )    Color: x / Appearance: x / SG: x / pH: x  Gluc: 110 mg/dL / Ketone: x  / Bili: x / Urobili: x   Blood: x / Protein: x / Nitrite: x   Leuk Esterase: x / RBC: x / WBC x   Sq Epi: x / Non Sq Epi: x / Bacteria: x    MEDICATIONS  (STANDING):  aspirin enteric coated 81 milliGRAM(s) Oral daily  bisacodyl Suppository 10 milliGRAM(s) Rectal once  carvedilol 3.125 milliGRAM(s) Oral every 12 hours  chlorhexidine 2% Cloths 1 Application(s) Topical daily  dextrose 5%. 1000 milliLiter(s) (100 mL/Hr) IV Continuous <Continuous>  dextrose 5%. 1000 milliLiter(s) (50 mL/Hr) IV Continuous <Continuous>  dextrose 50% Injectable 25 Gram(s) IV Push once  dextrose 50% Injectable 12.5 Gram(s) IV Push once  dextrose 50% Injectable 25 Gram(s) IV Push once  furosemide   Injectable 20 milliGRAM(s) IV Push once  glucagon  Injectable 1 milliGRAM(s) IntraMuscular once  heparin   Injectable 2500 Unit(s) SubCutaneous every 12 hours  insulin lispro (ADMELOG) corrective regimen sliding scale   SubCutaneous Before meals and at bedtime  latanoprost 0.005% Ophthalmic Solution 1 Drop(s) Both EYES at bedtime  magnesium oxide 400 milliGRAM(s) Oral once  pantoprazole    Tablet 40 milliGRAM(s) Oral before breakfast  polyethylene glycol 3350 17 Gram(s) Oral daily  sacubitril 24 mG/valsartan 26 mG 1 Tablet(s) Oral every 12 hours  senna 2 Tablet(s) Oral at bedtime  sodium chloride 0.9%. 1000 milliLiter(s) (10 mL/Hr) IV Continuous <Continuous>  sodium chloride 2% . 1000 milliLiter(s) (25 mL/Hr) IV Continuous <Continuous>    MEDICATIONS  (PRN):  acetaminophen     Tablet .. 1000 milliGRAM(s) Oral every 6 hours PRN Temp greater or equal to 38C (100.4F), Mild Pain (1 - 3)  dextrose Oral Gel 15 Gram(s) Oral once PRN Blood Glucose LESS THAN 70 milliGRAM(s)/deciliter  melatonin 5 milliGRAM(s) Oral at bedtime PRN Sleep  oxyCODONE    IR 5 milliGRAM(s) Oral every 6 hours PRN Severe Pain (7 - 10)    RADIOLOGY & ADDITIONAL TESTS:  < from: Xray Chest 1 View-PORTABLE IMMEDIATE (Xray Chest 1 View-PORTABLE IMMEDIATE .) (12.29.24 @ 22:41) >  IMPRESSION:  Interval placement of right sided pigtail catheter which may be kinked,   correlate with output. No pneumothorax.  Near resolution of right effusion.

## 2024-12-31 LAB
ADD ON TEST-SPECIMEN IN LAB: SIGNIFICANT CHANGE UP
ANION GAP SERPL CALC-SCNC: 10 MMOL/L — SIGNIFICANT CHANGE UP (ref 5–17)
BUN SERPL-MCNC: 20 MG/DL — SIGNIFICANT CHANGE UP (ref 7–23)
CALCIUM SERPL-MCNC: 8.6 MG/DL — SIGNIFICANT CHANGE UP (ref 8.4–10.5)
CHLORIDE SERPL-SCNC: 94 MMOL/L — LOW (ref 96–108)
CO2 SERPL-SCNC: 26 MMOL/L — SIGNIFICANT CHANGE UP (ref 22–31)
CREAT SERPL-MCNC: 0.68 MG/DL — SIGNIFICANT CHANGE UP (ref 0.5–1.3)
EGFR: 100 ML/MIN/1.73M2 — SIGNIFICANT CHANGE UP
GLUCOSE SERPL-MCNC: 103 MG/DL — HIGH (ref 70–99)
HCT VFR BLD CALC: 28.2 % — LOW (ref 34.5–45)
HGB BLD-MCNC: 9.2 G/DL — LOW (ref 11.5–15.5)
MAGNESIUM SERPL-MCNC: 1.9 MG/DL — SIGNIFICANT CHANGE UP (ref 1.6–2.6)
MCHC RBC-ENTMCNC: 30.9 PG — SIGNIFICANT CHANGE UP (ref 27–34)
MCHC RBC-ENTMCNC: 32.6 G/DL — SIGNIFICANT CHANGE UP (ref 32–36)
MCV RBC AUTO: 94.6 FL — SIGNIFICANT CHANGE UP (ref 80–100)
NRBC # BLD: 0 /100 WBCS — SIGNIFICANT CHANGE UP (ref 0–0)
PLATELET # BLD AUTO: 260 K/UL — SIGNIFICANT CHANGE UP (ref 150–400)
POTASSIUM SERPL-MCNC: 4.3 MMOL/L — SIGNIFICANT CHANGE UP (ref 3.5–5.3)
POTASSIUM SERPL-SCNC: 4.3 MMOL/L — SIGNIFICANT CHANGE UP (ref 3.5–5.3)
RBC # BLD: 2.98 M/UL — LOW (ref 3.8–5.2)
RBC # FLD: 14.2 % — SIGNIFICANT CHANGE UP (ref 10.3–14.5)
SODIUM SERPL-SCNC: 130 MMOL/L — LOW (ref 135–145)
WBC # BLD: 8.36 K/UL — SIGNIFICANT CHANGE UP (ref 3.8–10.5)
WBC # FLD AUTO: 8.36 K/UL — SIGNIFICANT CHANGE UP (ref 3.8–10.5)

## 2024-12-31 PROCEDURE — 71045 X-RAY EXAM CHEST 1 VIEW: CPT | Mod: 26

## 2024-12-31 RX ORDER — ATORVASTATIN CALCIUM 40 MG/1
40 TABLET, FILM COATED ORAL AT BEDTIME
Refills: 0 | Status: DISCONTINUED | OUTPATIENT
Start: 2024-12-31 | End: 2025-01-09

## 2024-12-31 RX ORDER — ACETAMINOPHEN 80 MG/.8ML
650 SOLUTION/ DROPS ORAL EVERY 6 HOURS
Refills: 0 | Status: DISCONTINUED | OUTPATIENT
Start: 2024-12-31 | End: 2025-01-09

## 2024-12-31 RX ORDER — APIXABAN 5 MG/1
5 TABLET, FILM COATED ORAL EVERY 12 HOURS
Refills: 0 | Status: DISCONTINUED | OUTPATIENT
Start: 2024-12-31 | End: 2025-01-01

## 2024-12-31 RX ORDER — BISACODYL 5 MG
5 TABLET, DELAYED RELEASE (ENTERIC COATED) ORAL AT BEDTIME
Refills: 0 | Status: DISCONTINUED | OUTPATIENT
Start: 2024-12-31 | End: 2025-01-09

## 2024-12-31 RX ORDER — SACUBITRIL AND VALSARTAN 24; 26 MG/1; MG/1
1 TABLET, FILM COATED ORAL ONCE
Refills: 0 | Status: COMPLETED | OUTPATIENT
Start: 2024-12-31 | End: 2024-12-31

## 2024-12-31 RX ORDER — SACUBITRIL AND VALSARTAN 24; 26 MG/1; MG/1
1 TABLET, FILM COATED ORAL EVERY 12 HOURS
Refills: 0 | Status: DISCONTINUED | OUTPATIENT
Start: 2024-12-31 | End: 2025-01-09

## 2024-12-31 RX ADMIN — SODIUM CHLORIDE 3 MILLILITER(S): 9 INJECTION, SOLUTION INTRAMUSCULAR; INTRAVENOUS; SUBCUTANEOUS at 06:28

## 2024-12-31 RX ADMIN — CARVEDILOL 3.12 MILLIGRAM(S): 25 TABLET, FILM COATED ORAL at 17:27

## 2024-12-31 RX ADMIN — Medication 5 MILLIGRAM(S): at 13:00

## 2024-12-31 RX ADMIN — ACETAMINOPHEN 650 MILLIGRAM(S): 80 SOLUTION/ DROPS ORAL at 21:30

## 2024-12-31 RX ADMIN — Medication 81 MILLIGRAM(S): at 11:38

## 2024-12-31 RX ADMIN — CHLORHEXIDINE GLUCONATE 1 APPLICATION(S): 1.2 RINSE ORAL at 06:27

## 2024-12-31 RX ADMIN — Medication 5 MILLIGRAM(S): at 19:44

## 2024-12-31 RX ADMIN — SODIUM CHLORIDE 3 MILLILITER(S): 9 INJECTION, SOLUTION INTRAMUSCULAR; INTRAVENOUS; SUBCUTANEOUS at 13:05

## 2024-12-31 RX ADMIN — APIXABAN 5 MILLIGRAM(S): 5 TABLET, FILM COATED ORAL at 17:25

## 2024-12-31 RX ADMIN — ACETAMINOPHEN 1000 MILLIGRAM(S): 80 SOLUTION/ DROPS ORAL at 06:05

## 2024-12-31 RX ADMIN — Medication 5 MILLIGRAM(S): at 18:31

## 2024-12-31 RX ADMIN — ACETAMINOPHEN 1000 MILLIGRAM(S): 80 SOLUTION/ DROPS ORAL at 07:05

## 2024-12-31 RX ADMIN — ATORVASTATIN CALCIUM 40 MILLIGRAM(S): 40 TABLET, FILM COATED ORAL at 21:09

## 2024-12-31 RX ADMIN — CARVEDILOL 3.12 MILLIGRAM(S): 25 TABLET, FILM COATED ORAL at 06:04

## 2024-12-31 RX ADMIN — PANTOPRAZOLE 40 MILLIGRAM(S): 40 TABLET, DELAYED RELEASE ORAL at 06:04

## 2024-12-31 RX ADMIN — SACUBITRIL AND VALSARTAN 1 TABLET(S): 24; 26 TABLET, FILM COATED ORAL at 06:04

## 2024-12-31 RX ADMIN — LATANOPROST 1 DROP(S): 50 SOLUTION OPHTHALMIC at 21:41

## 2024-12-31 RX ADMIN — SODIUM CHLORIDE 3 MILLILITER(S): 9 INJECTION, SOLUTION INTRAMUSCULAR; INTRAVENOUS; SUBCUTANEOUS at 21:07

## 2024-12-31 RX ADMIN — SACUBITRIL AND VALSARTAN 1 TABLET(S): 24; 26 TABLET, FILM COATED ORAL at 17:25

## 2024-12-31 RX ADMIN — Medication 17 GRAM(S): at 11:39

## 2024-12-31 RX ADMIN — SENNOSIDES 2 TABLET(S): 8.6 TABLET, FILM COATED ORAL at 21:10

## 2024-12-31 RX ADMIN — Medication 800 MILLIGRAM(S): at 08:52

## 2024-12-31 RX ADMIN — Medication 5 MILLIGRAM(S): at 19:48

## 2024-12-31 RX ADMIN — ACETAMINOPHEN 650 MILLIGRAM(S): 80 SOLUTION/ DROPS ORAL at 21:09

## 2024-12-31 RX ADMIN — HEPARIN SODIUM 2500 UNIT(S): 1000 INJECTION, SOLUTION INTRAVENOUS; SUBCUTANEOUS at 06:05

## 2024-12-31 RX ADMIN — SACUBITRIL AND VALSARTAN 1 TABLET(S): 24; 26 TABLET, FILM COATED ORAL at 11:38

## 2024-12-31 RX ADMIN — APIXABAN 5 MILLIGRAM(S): 5 TABLET, FILM COATED ORAL at 08:52

## 2024-12-31 RX ADMIN — Medication 5 MILLIGRAM(S): at 11:39

## 2024-12-31 NOTE — PROGRESS NOTE ADULT - SUBJECTIVE AND OBJECTIVE BOX
Patient discussed on morning rounds with Dr. Gray    OPERATION & DATE: 12/23/24 s/p sternotomy for AVR and ascending aorta replacement, cryo maze and JIM appendge closure (35mm)    SUBJECTIVE ASSESSMENT:    Patient reports to be feeling well, has pain over surgical incision site that improves with pain medication regimen. Pulling 900 cc on IS. Denies chest pain, headaches, weakness, numbness, or N/V.    VITAL SIGNS:  Vital Signs Last 24 Hrs  T(C): 36.2 (31 Dec 2024 08:59), Max: 37.1 (30 Dec 2024 17:18)  T(F): 97.2 (31 Dec 2024 08:59), Max: 98.8 (30 Dec 2024 17:18)  HR: 62 (31 Dec 2024 08:50) (62 - 86)  BP: 164/63 (31 Dec 2024 08:50) (112/65 - 175/83)  BP(mean): 91 (31 Dec 2024 08:50) (84 - 119)  RR: 16 (31 Dec 2024 08:50) (14 - 20)  SpO2: 93% (31 Dec 2024 08:50) (93% - 99%)    Parameters below as of 31 Dec 2024 08:50  Patient On (Oxygen Delivery Method): room air      I&O's Detail    30 Dec 2024 07:01  -  31 Dec 2024 07:00  --------------------------------------------------------  IN:    Oral Fluid: 600 mL  Total IN: 600 mL    OUT:    Bulb (mL): 20 mL    Chest Tube (mL): 25 mL    Voided (mL): 3975 mL  Total OUT: 4020 mL    Total NET: -3420 mL        CHEST TUBE: No  MINDA DRAIN: No  EPICARDIAL WIRES: no  STITCHES: no      PHYSICAL EXAM:  General: Sitting in bed comfortably in NAD  Neuro: A&Ox3, no focal deficits   HEENT: NCAT, EOMI   Cardiac: Regular rate and rhythm, normal S1 and S2. No m/r/g   Pulm: Breathing comfortably on room air. No signs of respiratory distress. Lungs are CTA b/l without wheezes, rales, or rhonchi   Abdomen: Soft, non-distended, non-tender. + bowel sounds   Extremities: Warm and well perfused, no peripheral edema, distal pulses 2+. No calf tenderness. SCDs and TEDs in place  MSK: Full AROM   Wound: MSI will approximated and without erythema. No sternal clicks. Chest tube sites are clean, dry, and open to air.       LABS:                        9.2    8.36  )-----------( 260      ( 31 Dec 2024 05:30 )             28.2     PT/INR - ( 30 Dec 2024 11:46 )   PT: 12.1 sec;   INR: 1.03          PTT - ( 30 Dec 2024 11:46 )  PTT:35.1 sec  12-31    130[L]  |  94[L]  |  20  ----------------------------<  103[H]  4.3   |  26  |  0.68    Ca    8.6      31 Dec 2024 05:30  Phos  4.0     12-30  Mg     1.9     12-31    TPro  6.4  /  Alb  3.4  /  TBili  0.4  /  DBili  0.2  /  AST  39  /  ALT  12  /  AlkPhos  147[H]  12-31    Urinalysis Basic - ( 31 Dec 2024 05:30 )    Color: x / Appearance: x / SG: x / pH: x  Gluc: 103 mg/dL / Ketone: x  / Bili: x / Urobili: x   Blood: x / Protein: x / Nitrite: x   Leuk Esterase: x / RBC: x / WBC x   Sq Epi: x / Non Sq Epi: x / Bacteria: x      MEDICATIONS  (STANDING):  apixaban 5 milliGRAM(s) Oral every 12 hours  aspirin enteric coated 81 milliGRAM(s) Oral daily  atorvastatin 40 milliGRAM(s) Oral at bedtime  bisacodyl Suppository 10 milliGRAM(s) Rectal once  carvedilol 3.125 milliGRAM(s) Oral every 12 hours  chlorhexidine 2% Cloths 1 Application(s) Topical daily  glucagon  Injectable 1 milliGRAM(s) IntraMuscular once  latanoprost 0.005% Ophthalmic Solution 1 Drop(s) Both EYES at bedtime  pantoprazole    Tablet 40 milliGRAM(s) Oral before breakfast  polyethylene glycol 3350 17 Gram(s) Oral daily  sacubitril 49 mG/valsartan 51 mG 1 Tablet(s) Oral every 12 hours  senna 2 Tablet(s) Oral at bedtime  sodium chloride 0.9% lock flush 3 milliLiter(s) IV Push every 8 hours  sodium chloride 0.9%. 1000 milliLiter(s) (10 mL/Hr) IV Continuous <Continuous>  sodium chloride 2% . 1000 milliLiter(s) (25 mL/Hr) IV Continuous <Continuous>    MEDICATIONS  (PRN):  acetaminophen     Tablet .. 650 milliGRAM(s) Oral every 6 hours PRN Moderate Pain (4 - 6)  melatonin 5 milliGRAM(s) Oral at bedtime PRN Sleep  oxyCODONE    IR 5 milliGRAM(s) Oral every 6 hours PRN Severe Pain (7 - 10)    RADIOLOGY & ADDITIONAL TESTS:

## 2024-12-31 NOTE — PROGRESS NOTE ADULT - ASSESSMENT
57 YO English-speaking Female with PMHx of HTN, HLD (not on statin 2/2 elevated LFT's), Anemia, NSTEMI  (no PCI), ?TIA in  (no deficits), pAfib (on Eliquis), HFpEF (EF 55-60%), gallstones who presented to [facility] for pre-op optimization. On 24 patient underwent sternotomy for AVR and ascending aorta replacement, cryo maze and JIM appendage closure (35mm). POD1 Stroke code called for lethargy, L sided weakness w/u negative. EEG for arm jerking- negative. On  and primacor. POD2 Extubated, HFNC for positive pressure.  weaned. POD3 Remained on low dose  and primacor. POD4  off, swan removed. Meds x 2 removed. weaned to NC. POD5 diuresed. Primacor weaned. POD6 diuresed. BB started. Conversion pause so pacing wires kept in. Pigtail placed for R pleural effusion. POD7 pigtail and wires removed. POCUS revealed small L pleural effusion with poor window. Transferred to [facility]. POD 8 Increased entresto to 49/51. Post-op TTE done with normal function of bioprosthetic valve. Statin re-started. Per PT/OT, patient okay to go home with home PT/OT.     Plan:    Neurovascular:   Left sided weakness    -Neuro recs appreciated   - repeat head CT negative for acute infarct  Hx of possible TIA (, no deficits)   -Pain well controlled with current regimen. PRN's: Tylenol and Oxycodone    Cardiovascular:   -POD 8 s/p sternotomy for AVR and ascending aorta replacement, cryo maze and JIM appendage closure (35mm), EF normal   -Cont ASA. Started on 5 mg Eliquis BID   -Post-op TTE done 24  HFpEF   -Cont Coreg, Entresto and Lasix. Increased entresto, track BP   Hx of pAFib   - Started on home Eliquis  Hx of HTN   -Cont BB  Hx of HLD: started 40 mg lipitor   Hemodynamically stable.       Respiratory:   Oxygenating well on room air  Encourage continued use of IS 10x/hr and frequent ambulation. Pulling 900 cc  CXR: L pleural effusion, stable and unchanging.     GI:  -GI PPX: Protonix  -PO Diet  -Bowel Regimen: senna and miralax     Renal / :  -Continue to monitor renal function: BUN/Cr: 20/0.68  -Monitor I/O's daily     Endocrine:    -A1c: 6.2  -TSH: 3.47    Hematologic:  -Hx of anemia  -CBC: H/H- .  -Coagulation Panel.    ID:  Afebrile, continue to monitor fever curve   WBC: 8    Prophylaxis:  -DVT prophylaxis not needed, on Eliquis   -Continue with SCD's b/l while patient is at rest     Disposition:  -Discharge home once patient is medically ready, PT/OT do not think patient needs rehab. Okay to go home with home PT/Ot and rollator.        59 YO Polish-speaking Female with PMHx of HTN, HLD (not on statin 2/2 elevated LFT's), Anemia, NSTEMI  (no PCI), ?TIA in  (no deficits), pAfib (on Eliquis), HFpEF (EF 55-60%), gallstones who presented to [facility] for pre-op optimization. On 24 patient underwent sternotomy for AVR and ascending aorta replacement, cryo maze and JIM appendage closure (35mm). POD1 Stroke code called for lethargy, L sided weakness w/u negative. EEG for arm jerking- negative. On  and primacor. POD2 Extubated, HFNC for positive pressure.  weaned. POD3 Remained on low dose  and primacor. POD4  off, swan removed. Meds x 2 removed. weaned to NC. POD5 diuresed. Primacor weaned. POD6 diuresed. BB started. Conversion pause so pacing wires kept in. Pigtail placed for R pleural effusion. POD7 pigtail and wires removed. POCUS revealed small L pleural effusion with poor window. Transferred to [facility]. POD 8 Increased entresto to 49/51. Post-op TTE done with normal function of bioprosthetic valve. Statin re-started. Per PT/OT, patient okay to go home with home PT/OT.     Plan:    Neurovascular:   Left sided weakness    -Neuro recs appreciated   - repeat head CT negative for acute infarct  Hx of possible TIA (, no deficits)   -Pain well controlled with current regimen. PRN's: Tylenol and Oxycodone    Cardiovascular:   -POD 8 s/p sternotomy for AVR and ascending aorta replacement, cryo maze and JIM appendage closure (35mm), EF normal   -Cont ASA. Started on 5 mg Eliquis BID   -Post-op TTE done 24  HFpEF   -Cont Coreg, Entresto and Lasix. Increased entresto, track BP   Hx of pAFib   - Started on home Eliquis  Hx of HTN   -Cont BB  Hx of HLD: started 40 mg lipitor   Hemodynamically stable.       Respiratory:   Oxygenating well on room air  Encourage continued use of IS 10x/hr and frequent ambulation. Pulling 900 cc  CXR: L pleural effusion, stable and unchanging.     GI:  -GI PPX: Protonix  -PO Diet  -Bowel Regimen: senna and miralax     Renal / :  -Continue to monitor renal function: BUN/Cr: 20/0.68  -Monitor I/O's daily     Endocrine:    -A1c: 6.2  -TSH: 3.47    Hematologic:  -Hx of anemia  -CBC: H/H- .  -Coagulation Panel.    ID:  Afebrile, continue to monitor fever curve   WBC: 8    Prophylaxis:  -DVT prophylaxis not needed, on Eliquis   -Continue with SCD's b/l while patient is at rest     Disposition:  -Discharge home once patient is medically ready, PT/OT do not think patient needs rehab. Okay to go home with home PT/OT and rollator.        57 YO Romanian-speaking Female with PMHx of HTN, HLD (not on statin 2/2 elevated LFT's), Anemia, NSTEMI  (no PCI), ?TIA in  (no deficits), pAfib (on Eliquis), HFpEF (EF 55-60%), gallstones who presented to [facility] for pre-op optimization. On 24 patient underwent sternotomy for AVR and ascending aorta replacement, cryo maze and JIM appendage closure (35mm). POD1 Stroke code called for lethargy, L sided weakness w/u negative. EEG for arm jerking- negative. On  and primacor. POD2 Extubated, HFNC for positive pressure.  weaned. POD3 Remained on low dose  and primacor. POD4  off, swan removed. Meds x 2 removed. weaned to NC. POD5 diuresed. Primacor weaned. POD6 diuresed. BB started. Conversion pause so pacing wires kept in. Pigtail placed for R pleural effusion. POD7 pigtail and wires removed. POCUS revealed small L pleural effusion with poor window. Transferred to [facility]. POD 8 Increased entresto to 49/51. Post-op TTE done with normal function of bioprosthetic valve. Statin re-started. Per PT/OT, patient should go to rehab. Starting authorization.     Plan:    Neurovascular:   Left sided weakness    -Neuro recs appreciated   - repeat head CT negative for acute infarct  Hx of possible TIA (, no deficits)   -Pain well controlled with current regimen. PRN's: Tylenol and Oxycodone    Cardiovascular:   -POD 8 s/p sternotomy for AVR and ascending aorta replacement, cryo maze and JIM appendage closure (35mm), EF normal   -Cont ASA. Started on 5 mg Eliquis BID   -Post-op TTE done 24  HFpEF   -Cont Coreg, Entresto and Lasix. Increased entresto, track BP   Hx of pAFib   - Started on home Eliquis  Hx of HTN   -Cont BB  Hx of HLD: started 40 mg lipitor   Hemodynamically stable.       Respiratory:   Oxygenating well on room air  Encourage continued use of IS 10x/hr and frequent ambulation. Pulling 900 cc  CXR: L pleural effusion, stable and unchanging.     GI:  -GI PPX: Protonix  -PO Diet  -Bowel Regimen: senna and miralax     Renal / :  -Continue to monitor renal function: BUN/Cr: 20/0.68  -Monitor I/O's daily     Endocrine:    -A1c: 6.2  -TSH: 3.47    Hematologic:  -Hx of anemia  -CBC: H/H-   -Coagulation Panel.    ID:  Afebrile, continue to monitor fever curve   WBC: 8    Prophylaxis:  -DVT prophylaxis not needed, on Eliquis   -Continue with SCD's b/l while patient is at rest     Disposition:  -Discharge home once patient is medically ready, auth rehab

## 2025-01-01 LAB
ALBUMIN SERPL ELPH-MCNC: 3.1 G/DL — LOW (ref 3.3–5)
ALP SERPL-CCNC: 133 U/L — HIGH (ref 40–120)
ALT FLD-CCNC: 9 U/L — LOW (ref 10–45)
ANION GAP SERPL CALC-SCNC: 7 MMOL/L — SIGNIFICANT CHANGE UP (ref 5–17)
AST SERPL-CCNC: 22 U/L — SIGNIFICANT CHANGE UP (ref 10–40)
BILIRUB SERPL-MCNC: 0.3 MG/DL — SIGNIFICANT CHANGE UP (ref 0.2–1.2)
BUN SERPL-MCNC: 15 MG/DL — SIGNIFICANT CHANGE UP (ref 7–23)
CALCIUM SERPL-MCNC: 8.9 MG/DL — SIGNIFICANT CHANGE UP (ref 8.4–10.5)
CHLORIDE SERPL-SCNC: 99 MMOL/L — SIGNIFICANT CHANGE UP (ref 96–108)
CO2 SERPL-SCNC: 27 MMOL/L — SIGNIFICANT CHANGE UP (ref 22–31)
CREAT SERPL-MCNC: 0.61 MG/DL — SIGNIFICANT CHANGE UP (ref 0.5–1.3)
EGFR: 102 ML/MIN/1.73M2 — SIGNIFICANT CHANGE UP
GLUCOSE SERPL-MCNC: 104 MG/DL — HIGH (ref 70–99)
HCT VFR BLD CALC: 26.9 % — LOW (ref 34.5–45)
HGB BLD-MCNC: 8.8 G/DL — LOW (ref 11.5–15.5)
MAGNESIUM SERPL-MCNC: 2 MG/DL — SIGNIFICANT CHANGE UP (ref 1.6–2.6)
MCHC RBC-ENTMCNC: 31.7 PG — SIGNIFICANT CHANGE UP (ref 27–34)
MCHC RBC-ENTMCNC: 32.7 G/DL — SIGNIFICANT CHANGE UP (ref 32–36)
MCV RBC AUTO: 96.8 FL — SIGNIFICANT CHANGE UP (ref 80–100)
NRBC # BLD: 0 /100 WBCS — SIGNIFICANT CHANGE UP (ref 0–0)
PHOSPHATE SERPL-MCNC: 3.2 MG/DL — SIGNIFICANT CHANGE UP (ref 2.5–4.5)
PLATELET # BLD AUTO: 305 K/UL — SIGNIFICANT CHANGE UP (ref 150–400)
POTASSIUM SERPL-MCNC: 4.6 MMOL/L — SIGNIFICANT CHANGE UP (ref 3.5–5.3)
POTASSIUM SERPL-SCNC: 4.6 MMOL/L — SIGNIFICANT CHANGE UP (ref 3.5–5.3)
PROT SERPL-MCNC: 6.1 G/DL — SIGNIFICANT CHANGE UP (ref 6–8.3)
RBC # BLD: 2.78 M/UL — LOW (ref 3.8–5.2)
RBC # FLD: 14.4 % — SIGNIFICANT CHANGE UP (ref 10.3–14.5)
SODIUM SERPL-SCNC: 133 MMOL/L — LOW (ref 135–145)
WBC # BLD: 7.72 K/UL — SIGNIFICANT CHANGE UP (ref 3.8–10.5)
WBC # FLD AUTO: 7.72 K/UL — SIGNIFICANT CHANGE UP (ref 3.8–10.5)

## 2025-01-01 PROCEDURE — 76604 US EXAM CHEST: CPT | Mod: 26

## 2025-01-01 PROCEDURE — 71045 X-RAY EXAM CHEST 1 VIEW: CPT | Mod: 26

## 2025-01-01 RX ORDER — POTASSIUM CHLORIDE 600 MG/1
10 TABLET, FILM COATED, EXTENDED RELEASE ORAL DAILY
Refills: 0 | Status: DISCONTINUED | OUTPATIENT
Start: 2025-01-02 | End: 2025-01-03

## 2025-01-01 RX ORDER — APIXABAN 5 MG/1
2.5 TABLET, FILM COATED ORAL EVERY 12 HOURS
Refills: 0 | Status: DISCONTINUED | OUTPATIENT
Start: 2025-01-01 | End: 2025-01-05

## 2025-01-01 RX ORDER — FUROSEMIDE 20 MG
20 TABLET ORAL DAILY
Refills: 0 | Status: DISCONTINUED | OUTPATIENT
Start: 2025-01-02 | End: 2025-01-03

## 2025-01-01 RX ORDER — IPRATROPIUM BROMIDE AND ALBUTEROL SULFATE .5; 2.5 MG/3ML; MG/3ML
3 SOLUTION RESPIRATORY (INHALATION) ONCE
Refills: 0 | Status: COMPLETED | OUTPATIENT
Start: 2025-01-01 | End: 2025-01-03

## 2025-01-01 RX ORDER — FUROSEMIDE 20 MG
20 TABLET ORAL ONCE
Refills: 0 | Status: COMPLETED | OUTPATIENT
Start: 2025-01-01 | End: 2025-01-01

## 2025-01-01 RX ADMIN — Medication 5 MILLIGRAM(S): at 12:11

## 2025-01-01 RX ADMIN — Medication 17 GRAM(S): at 11:14

## 2025-01-01 RX ADMIN — PANTOPRAZOLE 40 MILLIGRAM(S): 40 TABLET, DELAYED RELEASE ORAL at 07:17

## 2025-01-01 RX ADMIN — APIXABAN 2.5 MILLIGRAM(S): 5 TABLET, FILM COATED ORAL at 09:00

## 2025-01-01 RX ADMIN — Medication 20 MILLIGRAM(S): at 12:08

## 2025-01-01 RX ADMIN — SODIUM CHLORIDE 3 MILLILITER(S): 9 INJECTION, SOLUTION INTRAMUSCULAR; INTRAVENOUS; SUBCUTANEOUS at 13:35

## 2025-01-01 RX ADMIN — Medication 5 MILLIGRAM(S): at 13:35

## 2025-01-01 RX ADMIN — LATANOPROST 1 DROP(S): 50 SOLUTION OPHTHALMIC at 21:12

## 2025-01-01 RX ADMIN — ATORVASTATIN CALCIUM 40 MILLIGRAM(S): 40 TABLET, FILM COATED ORAL at 21:05

## 2025-01-01 RX ADMIN — Medication 81 MILLIGRAM(S): at 11:13

## 2025-01-01 RX ADMIN — SACUBITRIL AND VALSARTAN 1 TABLET(S): 24; 26 TABLET, FILM COATED ORAL at 17:19

## 2025-01-01 RX ADMIN — CARVEDILOL 3.12 MILLIGRAM(S): 25 TABLET, FILM COATED ORAL at 18:26

## 2025-01-01 RX ADMIN — SACUBITRIL AND VALSARTAN 1 TABLET(S): 24; 26 TABLET, FILM COATED ORAL at 05:30

## 2025-01-01 RX ADMIN — CHLORHEXIDINE GLUCONATE 1 APPLICATION(S): 1.2 RINSE ORAL at 05:46

## 2025-01-01 RX ADMIN — SENNOSIDES 2 TABLET(S): 8.6 TABLET, FILM COATED ORAL at 21:05

## 2025-01-01 RX ADMIN — APIXABAN 2.5 MILLIGRAM(S): 5 TABLET, FILM COATED ORAL at 21:05

## 2025-01-01 RX ADMIN — Medication 5 MILLIGRAM(S): at 19:18

## 2025-01-01 RX ADMIN — CARVEDILOL 3.12 MILLIGRAM(S): 25 TABLET, FILM COATED ORAL at 07:15

## 2025-01-01 RX ADMIN — Medication 5 MILLIGRAM(S): at 21:05

## 2025-01-01 RX ADMIN — SODIUM CHLORIDE 3 MILLILITER(S): 9 INJECTION, SOLUTION INTRAMUSCULAR; INTRAVENOUS; SUBCUTANEOUS at 21:12

## 2025-01-01 RX ADMIN — SODIUM CHLORIDE 3 MILLILITER(S): 9 INJECTION, SOLUTION INTRAMUSCULAR; INTRAVENOUS; SUBCUTANEOUS at 05:28

## 2025-01-01 RX ADMIN — Medication 5 MILLIGRAM(S): at 21:00

## 2025-01-01 NOTE — PROGRESS NOTE ADULT - SUBJECTIVE AND OBJECTIVE BOX
Patient discussed on morning rounds with Dr. Logan    OPERATION & DATE: 12/23/24 - AVR, ascending aorta replacement, cryomaze, JIM closure (35mm device), EF normal     SUBJECTIVE ASSESSMENT: Lola : [id number] Jasmin   Pt is feeling okay this morning, wondering about the timing of her blood thinner. Also has concerns about swelling in her legs. Otherwise progressing well and Denies any chest pain, palpitations, orthopnea, dyspnea on exertion, shortness of breath, wheezing, abd pain, nausea, vomiting, constipation, lightheadedness, headaches, fevers, or chills.    VITAL SIGNS:  Vital Signs Last 24 Hrs  T(C): 36.5 (01 Jan 2025 13:41), Max: 37.2 (31 Dec 2024 18:00)  T(F): 97.7 (01 Jan 2025 13:41), Max: 98.9 (31 Dec 2024 18:00)  HR: 64 (01 Jan 2025 09:00) (62 - 72)  BP: 121/68 (01 Jan 2025 09:00) (103/59 - 160/78)  BP(mean): 90 (01 Jan 2025 09:00) (77 - 111)  RR: 18 (01 Jan 2025 09:00) (16 - 18)  SpO2: 98% (01 Jan 2025 09:00) (94% - 98%)    Parameters below as of 01 Jan 2025 09:00  Patient On (Oxygen Delivery Method): room air      I&O's Detail    01 Jan 2025 07:01  -  01 Jan 2025 14:03  --------------------------------------------------------  IN:    Oral Fluid: 240 mL  Total IN: 240 mL    OUT:    Voided (mL): 750 mL  Total OUT: 750 mL    Total NET: -510 mL    CHEST TUBE:  no  MINDA DRAIN:  no  EPICARDIAL WIRES: no  STITCHES: no  STAPLES: no  TRAN:  no  CENTRAL LINE: no  MIDLINE/PICC: no  WOUND VAC:  no     PHYSICAL EXAM:  General: well appearing sitting in chair in NAD   HEENT: normocephalic, atraumatic  Cardio: normal s1/s2   Pulm: lungs cta b/l  GI: +BS 4 quadrants   Extremities: 1+ pitting edema in LE b/l   Vascular: dp 2+ b/l   Incisions: sternotomy healing well no erythema, purulence or ecchymosis     LABS:                        8.8    7.72  )-----------( 305      ( 01 Jan 2025 05:30 )             26.9       01-01    133[L]  |  99  |  15  ----------------------------<  104[H]  4.6   |  27  |  0.61    Ca    8.9      01 Jan 2025 05:30  Phos  3.2     01-01  Mg     2.0     01-01    TPro  6.1  /  Alb  3.1[L]  /  TBili  0.3  /  DBili  x   /  AST  22  /  ALT  9[L]  /  AlkPhos  133[H]  01-01    Urinalysis Basic - ( 01 Jan 2025 05:30 )    Color: x / Appearance: x / SG: x / pH: x  Gluc: 104 mg/dL / Ketone: x  / Bili: x / Urobili: x   Blood: x / Protein: x / Nitrite: x   Leuk Esterase: x / RBC: x / WBC x   Sq Epi: x / Non Sq Epi: x / Bacteria: x      MEDICATIONS  (STANDING):  apixaban 2.5 milliGRAM(s) Oral every 12 hours  aspirin enteric coated 81 milliGRAM(s) Oral daily  atorvastatin 40 milliGRAM(s) Oral at bedtime  bisacodyl 5 milliGRAM(s) Oral at bedtime  carvedilol 3.125 milliGRAM(s) Oral every 12 hours  chlorhexidine 2% Cloths 1 Application(s) Topical daily  glucagon  Injectable 1 milliGRAM(s) IntraMuscular once  latanoprost 0.005% Ophthalmic Solution 1 Drop(s) Both EYES at bedtime  pantoprazole    Tablet 40 milliGRAM(s) Oral before breakfast  polyethylene glycol 3350 17 Gram(s) Oral daily  sacubitril 49 mG/valsartan 51 mG 1 Tablet(s) Oral every 12 hours  senna 2 Tablet(s) Oral at bedtime  sodium chloride 0.9% lock flush 3 milliLiter(s) IV Push every 8 hours  sodium chloride 0.9%. 1000 milliLiter(s) (10 mL/Hr) IV Continuous <Continuous>  sodium chloride 2% . 1000 milliLiter(s) (25 mL/Hr) IV Continuous <Continuous>    MEDICATIONS  (PRN):  acetaminophen     Tablet .. 650 milliGRAM(s) Oral every 6 hours PRN Moderate Pain (4 - 6)  melatonin 5 milliGRAM(s) Oral at bedtime PRN Sleep  oxyCODONE    IR 5 milliGRAM(s) Oral every 6 hours PRN Severe Pain (7 - 10)    RADIOLOGY & ADDITIONAL TESTS:  pending tte tomorrow

## 2025-01-01 NOTE — PROGRESS NOTE ADULT - ASSESSMENT
57 y/o Tamazight-speaking F with PMHx of HTN, HLD, Anemia, NSTEMI 2011 (no PCI), ?TIA in  (no deficits), pAfib (on Eliquis), HFpEF (EF 55-60%), gallstones who presented to [facility] for pre-op optimization. On 24 patient underwent sternotomy for AVR and ascending aorta replacement, cryo maze and JIM appendage closure (35mm). POD1 Stroke code called for lethargy, L sided weakness w/u negative. EEG for arm jerking- negative. On  and primacor. POD2 Extubated, HFNC for positive pressure.  weaned. POD3 Remained on low dose  and primacor. POD4  off, swan removed. Meds x 2 removed. weaned to NC. POD5 diuresed. Primacor weaned. POD6 diuresed. BB started. Conversion pause so pacing wires kept in. Pigtail placed for R pleural effusion. POD7 pigtail and wires removed. POCUS revealed small L pleural effusion with poor window. Transferred to [facility]. POD 8 Increased entresto to 49/51. Post-op TTE done with normal function of bioprosthetic valve. Statin re-started. Per PT/OT, patient should go to rehab. Starting authorization. POD 9 restarted 1/2 dose eliquis per Dr. Arizmendi/Doreen given extensive aortic surgery, TTE limited for effusion ordered for tomorrow after initiation  59 y/o Kiswahili-speaking F with PMHx of HTN, HLD, Anemia, NSTEMI 2011 (no PCI), ?TIA in  (no deficits), pAfib (on Eliquis), HFpEF (EF 55-60%), gallstones who presented to [facility] for pre-op optimization. On 24 patient underwent sternotomy for AVR and ascending aorta replacement, cryo maze and JIM appendage closure (35mm). POD1 Stroke code called for lethargy, L sided weakness w/u negative. EEG for arm jerking- negative. On  and primacor. POD2 Extubated, HFNC for positive pressure.  weaned. POD3 Remained on low dose  and primacor. POD4  off, swan removed. Meds x 2 removed. weaned to NC. POD5 diuresed. Primacor weaned. POD6 diuresed. BB started. Conversion pause so pacing wires kept in. Pigtail placed for R pleural effusion. POD7 pigtail and wires removed. POCUS revealed small L pleural effusion with poor window. Transferred to [facility]. POD 8 Increased entresto to 49/51. Post-op TTE done with normal function of bioprosthetic valve. Statin re-started. Per PT/OT, patient should go to rehab. Starting authorization. POD 9 restarted 1/2 dose eliquis per Dr. Arizmendi/Doreen given extensive aortic surgery, TTE limited for effusion ordered for tomorrow after initiation. Clarify need for rehab vs home with PT tomorrow, rehab process has not been started.     Plan:    Neurovascular:   -Pain regimen:    -PRN's: acetaminophen, oxycodone   -Insomnia: melatonin    HEENT:  -home eye drops: latanoprost b/l    Cardiovascular:   -POD9 AVR, ascending, cryomaze, JIM closure, EF normal     -continue with ASA, Statin 40mg daily    -drains and wires all removed   -HFpEF     -continue with Coreg, Entresto  -Afib: eliquis 2.5mg Q12 hours restarted today (1/2 dose 2/2 to high risk of bleeding given extensive aortic surgery)    -limited TTE ordered for tomorrow to r/o effusion after initiation of NOAC   -Hemodynamically stable.   -Monitor: BP, HR, tele    Respiratory:   -Oxygenating well on room air   -Encourage continued use of IS 10x/hr and frequent ambulation  -CXR: small left effusion, stable     GI:  -elevated alk phos: trend in AM   -GI PPX: pantoprazole 40mg daily   -PO Diet: DASH/TLC   -Bowel Regimen: senna, miralax     Renal / :  -BUN/Cr 15/0.61   -Monitor I/O's daily     Endocrine:    -A1c: 6.2   -TSH: 3.4     Hematologic:  -CBC: H/H- 8.8     ID:  -Temperature: afebrile   -CBC: WBC- 7.7     Prophylaxis:  -DVT prophylaxis with eliquis 2.5mg Q12 hours   -Continue with SCD's b/l while patient is at rest     Disposition:  -Discharge home vs rehab once medically ready (need to clarify dispo status tomorrow)    57 y/o Yi-speaking F with PMHx of HTN, HLD, Anemia, NSTEMI 2011 (no PCI), ?TIA in  (no deficits), pAfib (on Eliquis), HFpEF (EF 55-60%), gallstones who presented to [facility] for pre-op optimization. On 24 patient underwent sternotomy for AVR and ascending aorta replacement, cryo maze and JIM appendage closure (35mm). POD1 Stroke code called for lethargy, L sided weakness w/u negative. EEG for arm jerking- negative. On  and primacor. POD2 Extubated, HFNC for positive pressure.  weaned. POD3 Remained on low dose  and primacor. POD4  off, swan removed. Meds x 2 removed. weaned to NC. POD5 diuresed. Primacor weaned. POD6 diuresed. BB started. Conversion pause so pacing wires kept in. Pigtail placed for R pleural effusion. POD7 pigtail and wires removed. POCUS revealed small L pleural effusion with poor window. Transferred to [facility]. POD 8 Increased entresto to 49/51. Post-op TTE done with normal function of bioprosthetic valve. Statin re-started. Per PT/OT, patient should go to rehab. Starting authorization. POD 9 restarted 1/2 dose eliquis per Dr. Arizmendi/Doreen given extensive aortic surgery, TTE limited for effusion ordered for tomorrow after initiation. Clarify need for rehab vs home with PT tomorrow, rehab process has not been started.     Plan:  Neurovascular:   -Pain regimen:    -PRN's: acetaminophen, oxycodone   -Insomnia: melatonin    HEENT:  -home eye drops: latanoprost b/l    Cardiovascular:   -POD9 AVR, ascending, cryomaze, JIM closure, EF normal     -continue with ASA, Statin 40mg daily    -drains and wires all removed   -HFpEF     -continue with Coreg, Entresto  -Afib: eliquis 2.5mg Q12 hours restarted today (1/2 dose 2/2 to high risk of bleeding given extensive aortic surgery)    -limited TTE ordered for tomorrow to r/o effusion after initiation of NOAC   -Hemodynamically stable.   -Monitor: BP, HR, tele    Respiratory:   -Oxygenating well on room air   -Encourage continued use of IS 10x/hr and frequent ambulation  -CXR: small left effusion, stable     GI:  -elevated alk phos: trend in AM   -GI PPX: pantoprazole 40mg daily   -PO Diet: DASH/TLC   -Bowel Regimen: senna, miralax     Renal / :  -Diuresis plan: gave 20mg IV lasix today, starting PO lasix 20mg daily + K tomorrow     -trend LE edema, 1-2+ pitting b/l     -compression stockings placed today   -BUN/Cr 15/0.61   -Monitor I/O's daily     Endocrine:    -A1c: 6.2   -TSH: 3.4     Hematologic:  -CBC: H/H- 8.8/     ID:  -Temperature: afebrile   -CBC: WBC- 7.7     Prophylaxis:  -DVT prophylaxis with eliquis 2.5mg Q12 hours   -Continue with SCD's b/l while patient is at rest     Disposition:  -Discharge home vs rehab once medically ready (need to clarify dispo status tomorrow)

## 2025-01-02 ENCOUNTER — RESULT REVIEW (OUTPATIENT)
Age: 60
End: 2025-01-02

## 2025-01-02 LAB
ALBUMIN SERPL ELPH-MCNC: 3.3 G/DL — SIGNIFICANT CHANGE UP (ref 3.3–5)
ALP SERPL-CCNC: 131 U/L — HIGH (ref 40–120)
ALT FLD-CCNC: 10 U/L — SIGNIFICANT CHANGE UP (ref 10–45)
ANION GAP SERPL CALC-SCNC: 7 MMOL/L — SIGNIFICANT CHANGE UP (ref 5–17)
AST SERPL-CCNC: 27 U/L — SIGNIFICANT CHANGE UP (ref 10–40)
BILIRUB SERPL-MCNC: 0.4 MG/DL — SIGNIFICANT CHANGE UP (ref 0.2–1.2)
BUN SERPL-MCNC: 13 MG/DL — SIGNIFICANT CHANGE UP (ref 7–23)
CALCIUM SERPL-MCNC: 8.7 MG/DL — SIGNIFICANT CHANGE UP (ref 8.4–10.5)
CHLORIDE SERPL-SCNC: 95 MMOL/L — LOW (ref 96–108)
CO2 SERPL-SCNC: 29 MMOL/L — SIGNIFICANT CHANGE UP (ref 22–31)
CREAT SERPL-MCNC: 0.68 MG/DL — SIGNIFICANT CHANGE UP (ref 0.5–1.3)
EGFR: 100 ML/MIN/1.73M2 — SIGNIFICANT CHANGE UP
GLUCOSE SERPL-MCNC: 107 MG/DL — HIGH (ref 70–99)
HCT VFR BLD CALC: 27.4 % — LOW (ref 34.5–45)
HGB BLD-MCNC: 9 G/DL — LOW (ref 11.5–15.5)
MAGNESIUM SERPL-MCNC: 1.8 MG/DL — SIGNIFICANT CHANGE UP (ref 1.6–2.6)
MCHC RBC-ENTMCNC: 31.7 PG — SIGNIFICANT CHANGE UP (ref 27–34)
MCHC RBC-ENTMCNC: 32.8 G/DL — SIGNIFICANT CHANGE UP (ref 32–36)
MCV RBC AUTO: 96.5 FL — SIGNIFICANT CHANGE UP (ref 80–100)
NRBC # BLD: 0 /100 WBCS — SIGNIFICANT CHANGE UP (ref 0–0)
PLATELET # BLD AUTO: 375 K/UL — SIGNIFICANT CHANGE UP (ref 150–400)
POTASSIUM SERPL-MCNC: 4.5 MMOL/L — SIGNIFICANT CHANGE UP (ref 3.5–5.3)
POTASSIUM SERPL-SCNC: 4.5 MMOL/L — SIGNIFICANT CHANGE UP (ref 3.5–5.3)
PROT SERPL-MCNC: 6.2 G/DL — SIGNIFICANT CHANGE UP (ref 6–8.3)
RBC # BLD: 2.84 M/UL — LOW (ref 3.8–5.2)
RBC # FLD: 14.3 % — SIGNIFICANT CHANGE UP (ref 10.3–14.5)
SODIUM SERPL-SCNC: 131 MMOL/L — LOW (ref 135–145)
WBC # BLD: 8.94 K/UL — SIGNIFICANT CHANGE UP (ref 3.8–10.5)
WBC # FLD AUTO: 8.94 K/UL — SIGNIFICANT CHANGE UP (ref 3.8–10.5)

## 2025-01-02 PROCEDURE — 93308 TTE F-UP OR LMTD: CPT | Mod: 26

## 2025-01-02 PROCEDURE — 71045 X-RAY EXAM CHEST 1 VIEW: CPT | Mod: 26

## 2025-01-02 RX ADMIN — Medication 81 MILLIGRAM(S): at 12:11

## 2025-01-02 RX ADMIN — ACETAMINOPHEN 650 MILLIGRAM(S): 80 SOLUTION/ DROPS ORAL at 17:10

## 2025-01-02 RX ADMIN — APIXABAN 2.5 MILLIGRAM(S): 5 TABLET, FILM COATED ORAL at 20:22

## 2025-01-02 RX ADMIN — Medication 5 MILLIGRAM(S): at 18:10

## 2025-01-02 RX ADMIN — Medication 17 GRAM(S): at 18:12

## 2025-01-02 RX ADMIN — APIXABAN 2.5 MILLIGRAM(S): 5 TABLET, FILM COATED ORAL at 23:00

## 2025-01-02 RX ADMIN — ATORVASTATIN CALCIUM 40 MILLIGRAM(S): 40 TABLET, FILM COATED ORAL at 21:12

## 2025-01-02 RX ADMIN — Medication 800 MILLIGRAM(S): at 10:53

## 2025-01-02 RX ADMIN — CARVEDILOL 3.12 MILLIGRAM(S): 25 TABLET, FILM COATED ORAL at 17:41

## 2025-01-02 RX ADMIN — SENNOSIDES 2 TABLET(S): 8.6 TABLET, FILM COATED ORAL at 21:12

## 2025-01-02 RX ADMIN — Medication 5 MILLIGRAM(S): at 18:35

## 2025-01-02 RX ADMIN — LATANOPROST 1 DROP(S): 50 SOLUTION OPHTHALMIC at 21:19

## 2025-01-02 RX ADMIN — CHLORHEXIDINE GLUCONATE 1 APPLICATION(S): 1.2 RINSE ORAL at 05:42

## 2025-01-02 RX ADMIN — SODIUM CHLORIDE 3 MILLILITER(S): 9 INJECTION, SOLUTION INTRAMUSCULAR; INTRAVENOUS; SUBCUTANEOUS at 05:42

## 2025-01-02 RX ADMIN — POTASSIUM CHLORIDE 10 MILLIEQUIVALENT(S): 600 TABLET, FILM COATED, EXTENDED RELEASE ORAL at 12:12

## 2025-01-02 RX ADMIN — SODIUM CHLORIDE 3 MILLILITER(S): 9 INJECTION, SOLUTION INTRAMUSCULAR; INTRAVENOUS; SUBCUTANEOUS at 14:00

## 2025-01-02 RX ADMIN — ACETAMINOPHEN 650 MILLIGRAM(S): 80 SOLUTION/ DROPS ORAL at 07:21

## 2025-01-02 RX ADMIN — CARVEDILOL 3.12 MILLIGRAM(S): 25 TABLET, FILM COATED ORAL at 07:22

## 2025-01-02 RX ADMIN — ACETAMINOPHEN 650 MILLIGRAM(S): 80 SOLUTION/ DROPS ORAL at 18:35

## 2025-01-02 RX ADMIN — Medication 20 MILLIGRAM(S): at 05:45

## 2025-01-02 RX ADMIN — SACUBITRIL AND VALSARTAN 1 TABLET(S): 24; 26 TABLET, FILM COATED ORAL at 18:41

## 2025-01-02 RX ADMIN — Medication 5 MILLIGRAM(S): at 21:12

## 2025-01-02 RX ADMIN — PANTOPRAZOLE 40 MILLIGRAM(S): 40 TABLET, DELAYED RELEASE ORAL at 07:22

## 2025-01-02 RX ADMIN — SACUBITRIL AND VALSARTAN 1 TABLET(S): 24; 26 TABLET, FILM COATED ORAL at 05:45

## 2025-01-02 NOTE — PROGRESS NOTE ADULT - ASSESSMENT
57 y/o Turkmen-speaking F with PMHx of HTN, HLD, Anemia, NSTEMI 2011 (no PCI), ?TIA in  (no deficits), pAfib (on Eliquis), HFpEF (EF 55-60%), gallstones who presented to [facility] for pre-op optimization. On 24 patient underwent sternotomy for AVR and ascending aorta replacement, cryo maze and JIM appendage closure (35mm). POD1 Stroke code called for lethargy, L sided weakness w/u negative. EEG for arm jerking- negative. On  and primacor. POD2 Extubated, HFNC for positive pressure.  weaned. POD3 Remained on low dose  and primacor. POD4  off, swan removed. Meds x 2 removed. weaned to NC. POD5 diuresed. Primacor weaned. POD6 diuresed. BB started. Conversion pause so pacing wires kept in. Pigtail placed for R pleural effusion. POD7 pigtail and wires removed. POCUS revealed small L pleural effusion with poor window. Transferred to [facility]. POD 8 Increased entresto to 49/51. Post-op TTE done with normal function of bioprosthetic valve. Statin re-started. Per PT/OT, patient should go to rehab. Starting authorization. POD 9 restarted 1/2 dose eliquis per Dr. Arizmendi/Doreen given extensive aortic surgery. POD 10 TTE demonstrated no pericardial effusion after starting eliquis. Rehab acceptance pending.     Plan:  Neurovascular: stroke code called but negative  -Pain regimen:    -PRN's: acetaminophen, oxycodone   -Insomnia: melatonin    HEENT:  -home eye drops: latanoprost b/l    Cardiovascular:   -POD9 AVR, ascending, cryomaze, JIM closure, EF normal     -continue with ASA, Statin 40mg daily    -drains and wires all removed   -HFpEF     -continue with Coreg, Entresto  -Afib: eliquis 2.5mg Q12 hours restarted  (1/2 dose 2/2 to high risk of bleeding given extensive aortic surgery)    -limited TTE without effusion after initiation of NOAC   -Hemodynamically stable.   -Monitor: BP, HR, tele    Respiratory:   -Oxygenating well on room air   -Encourage continued use of IS 10x/hr and frequent ambulation  -CXR: small left effusion, stable   - continue duonebs PRN     GI:  -elevated alk phos: trend in AM   -GI PPX: pantoprazole 40mg daily   -PO Diet: DASH/TLC   -Bowel Regimen: senna, miralax     Renal / :  -Diuresis plan: Lasix 20 PO Daily.     -trend LE edema, 1-2+ pitting b/l     -compression stockings placed   -BUN/Cr 15/0.61   -Monitor I/O's daily     Endocrine:    -A1c: 6.2   -TSH: 3.4     Hematologic:  -CBC: H/H- 8.8/26   - Eliquis 2.5 mg BID     ID:  -Temperature: afebrile   -CBC: WBC- 8.9     Prophylaxis:  -DVT prophylaxis with eliquis 2.5mg Q12 hours   -Continue with SCD's b/l while patient is at rest     Disposition:  -Discharge to rehab once medically ready

## 2025-01-02 NOTE — PROGRESS NOTE ADULT - SUBJECTIVE AND OBJECTIVE BOX
Patient discussed on morning rounds with       Operation / Date: 12/23/24: AVR, ascending aorta replacement, cryomaze and JIM appendge closure (35mm), EF normal     SUBJECTIVE ASSESSMENT:  59y Female reports still having pain when taking deep breaths and that she feels like her legs are still swollen        Vital Signs Last 24 Hrs  T(C): 36.9 (02 Jan 2025 08:59), Max: 37.1 (02 Jan 2025 01:02)  T(F): 98.4 (02 Jan 2025 08:59), Max: 98.8 (02 Jan 2025 01:02)  HR: 65 (02 Jan 2025 09:00) (62 - 68)  BP: 99/55 (02 Jan 2025 09:00) (99/55 - 126/58)  BP(mean): 80 (02 Jan 2025 05:01) (80 - 84)  RR: 15 (02 Jan 2025 09:00) (15 - 18)  SpO2: 96% (02 Jan 2025 09:00) (96% - 98%)    Parameters below as of 02 Jan 2025 09:00  Patient On (Oxygen Delivery Method): room air      I&O's Detail    01 Jan 2025 07:01  -  02 Jan 2025 07:00  --------------------------------------------------------  IN:    Oral Fluid: 360 mL  Total IN: 360 mL    OUT:    Voided (mL): 950 mL  Total OUT: 950 mL    Total NET: -590 mL      02 Jan 2025 07:01  -  02 Jan 2025 12:27  --------------------------------------------------------  IN:    Oral Fluid: 180 mL  Total IN: 180 mL    OUT:  Total OUT: 0 mL    Total NET: 180 mL      PHYSICAL EXAM:    GEN: NAD, looks comfortable  Neuro: A&Ox3.  No focal deficits.  Moving all extremities.   CV: S1S2, regular, no murmurs appreciated.  No carotid bruits.  No JVD  Lungs: Clear B/L.  No wheezing, rales or rhonchi  ABD: Soft, non-tender, non-distended.  +Bowel sounds  EXT: Warm and well perfused.  1+ pitting edema. All Distal pulses palpable bilaterally.   Musculoskeletal: Moving all extremities with normal ROM, no joint swelling  Incisions: MSI c/d/i no sternal click appreciate fd    LABS:                        9.0    8.94  )-----------( 375      ( 02 Jan 2025 05:30 )             27.4           01-02    131[L]  |  95[L]  |  13  ----------------------------<  107[H]  4.5   |  29  |  0.68    Ca    8.7      02 Jan 2025 05:30  Phos  3.2     01-01  Mg     1.8     01-02    TPro  6.2  /  Alb  3.3  /  TBili  0.4  /  DBili  x   /  AST  27  /  ALT  10  /  AlkPhos  131[H]  01-02      Urinalysis Basic - ( 02 Jan 2025 05:30 )    Color: x / Appearance: x / SG: x / pH: x  Gluc: 107 mg/dL / Ketone: x  / Bili: x / Urobili: x   Blood: x / Protein: x / Nitrite: x   Leuk Esterase: x / RBC: x / WBC x   Sq Epi: x / Non Sq Epi: x / Bacteria: x        MEDICATIONS  (STANDING):  albuterol/ipratropium for Nebulization. 3 milliLiter(s) Nebulizer once  apixaban 2.5 milliGRAM(s) Oral every 12 hours  aspirin enteric coated 81 milliGRAM(s) Oral daily  atorvastatin 40 milliGRAM(s) Oral at bedtime  bisacodyl 5 milliGRAM(s) Oral at bedtime  carvedilol 3.125 milliGRAM(s) Oral every 12 hours  chlorhexidine 2% Cloths 1 Application(s) Topical daily  furosemide    Tablet 20 milliGRAM(s) Oral daily  glucagon  Injectable 1 milliGRAM(s) IntraMuscular once  latanoprost 0.005% Ophthalmic Solution 1 Drop(s) Both EYES at bedtime  pantoprazole    Tablet 40 milliGRAM(s) Oral before breakfast  polyethylene glycol 3350 17 Gram(s) Oral daily  potassium chloride    Tablet ER 10 milliEquivalent(s) Oral daily  sacubitril 49 mG/valsartan 51 mG 1 Tablet(s) Oral every 12 hours  senna 2 Tablet(s) Oral at bedtime  sodium chloride 0.9% lock flush 3 milliLiter(s) IV Push every 8 hours  sodium chloride 0.9%. 1000 milliLiter(s) (10 mL/Hr) IV Continuous <Continuous>  sodium chloride 2% . 1000 milliLiter(s) (25 mL/Hr) IV Continuous <Continuous>    MEDICATIONS  (PRN):  acetaminophen     Tablet .. 650 milliGRAM(s) Oral every 6 hours PRN Moderate Pain (4 - 6)  melatonin 5 milliGRAM(s) Oral at bedtime PRN Sleep  oxyCODONE    IR 5 milliGRAM(s) Oral every 6 hours PRN Severe Pain (7 - 10)        RADIOLOGY & ADDITIONAL TESTS:  < from: TTE Echo Limited or F/U (01.02.25 @ 08:51) >  CONCLUSIONS:     1. Normal left ventricular size and systolic function.   2. Moderate symmetric left ventricular hypertrophy.   3. Normal right ventricular size and systolic function.   4. Bioprosthetic valve is seen in the aortic position.   5. No pericardial effusion.   6. Left pleural effusion.   7. Compared to the previous TTE performed on 12/24/2024, there have been   no significant interval changes.   8. Consider infiltrative disease.    < end of copied text >

## 2025-01-03 LAB
ALBUMIN SERPL ELPH-MCNC: 3.2 G/DL — LOW (ref 3.3–5)
ALP SERPL-CCNC: 131 U/L — HIGH (ref 40–120)
ALT FLD-CCNC: 11 U/L — SIGNIFICANT CHANGE UP (ref 10–45)
ANION GAP SERPL CALC-SCNC: 8 MMOL/L — SIGNIFICANT CHANGE UP (ref 5–17)
APTT BLD: 38.4 SEC — HIGH (ref 24.5–35.6)
AST SERPL-CCNC: 28 U/L — SIGNIFICANT CHANGE UP (ref 10–40)
BASOPHILS # BLD AUTO: 0.03 K/UL — SIGNIFICANT CHANGE UP (ref 0–0.2)
BASOPHILS NFR BLD AUTO: 0.4 % — SIGNIFICANT CHANGE UP (ref 0–2)
BILIRUB SERPL-MCNC: 0.3 MG/DL — SIGNIFICANT CHANGE UP (ref 0.2–1.2)
BUN SERPL-MCNC: 12 MG/DL — SIGNIFICANT CHANGE UP (ref 7–23)
CALCIUM SERPL-MCNC: 8.9 MG/DL — SIGNIFICANT CHANGE UP (ref 8.4–10.5)
CHLORIDE SERPL-SCNC: 99 MMOL/L — SIGNIFICANT CHANGE UP (ref 96–108)
CO2 SERPL-SCNC: 26 MMOL/L — SIGNIFICANT CHANGE UP (ref 22–31)
CREAT SERPL-MCNC: 0.57 MG/DL — SIGNIFICANT CHANGE UP (ref 0.5–1.3)
EGFR: 104 ML/MIN/1.73M2 — SIGNIFICANT CHANGE UP
EOSINOPHIL # BLD AUTO: 0.21 K/UL — SIGNIFICANT CHANGE UP (ref 0–0.5)
EOSINOPHIL NFR BLD AUTO: 2.8 % — SIGNIFICANT CHANGE UP (ref 0–6)
GLUCOSE SERPL-MCNC: 98 MG/DL — SIGNIFICANT CHANGE UP (ref 70–99)
HCT VFR BLD CALC: 28.6 % — LOW (ref 34.5–45)
HGB BLD-MCNC: 9.2 G/DL — LOW (ref 11.5–15.5)
IMM GRANULOCYTES NFR BLD AUTO: 1.4 % — HIGH (ref 0–0.9)
INR BLD: 1.43 — HIGH (ref 0.85–1.16)
LYMPHOCYTES # BLD AUTO: 1.32 K/UL — SIGNIFICANT CHANGE UP (ref 1–3.3)
LYMPHOCYTES # BLD AUTO: 17.9 % — SIGNIFICANT CHANGE UP (ref 13–44)
MAGNESIUM SERPL-MCNC: 1.9 MG/DL — SIGNIFICANT CHANGE UP (ref 1.6–2.6)
MCHC RBC-ENTMCNC: 30.8 PG — SIGNIFICANT CHANGE UP (ref 27–34)
MCHC RBC-ENTMCNC: 32.2 G/DL — SIGNIFICANT CHANGE UP (ref 32–36)
MCV RBC AUTO: 95.7 FL — SIGNIFICANT CHANGE UP (ref 80–100)
MONOCYTES # BLD AUTO: 0.99 K/UL — HIGH (ref 0–0.9)
MONOCYTES NFR BLD AUTO: 13.4 % — SIGNIFICANT CHANGE UP (ref 2–14)
NEUTROPHILS # BLD AUTO: 4.73 K/UL — SIGNIFICANT CHANGE UP (ref 1.8–7.4)
NEUTROPHILS NFR BLD AUTO: 64.1 % — SIGNIFICANT CHANGE UP (ref 43–77)
NRBC # BLD: 0 /100 WBCS — SIGNIFICANT CHANGE UP (ref 0–0)
PHOSPHATE SERPL-MCNC: 3.6 MG/DL — SIGNIFICANT CHANGE UP (ref 2.5–4.5)
PLATELET # BLD AUTO: 427 K/UL — HIGH (ref 150–400)
POTASSIUM SERPL-MCNC: 4.2 MMOL/L — SIGNIFICANT CHANGE UP (ref 3.5–5.3)
POTASSIUM SERPL-SCNC: 4.2 MMOL/L — SIGNIFICANT CHANGE UP (ref 3.5–5.3)
PROT SERPL-MCNC: 6.2 G/DL — SIGNIFICANT CHANGE UP (ref 6–8.3)
PROTHROM AB SERPL-ACNC: 16.4 SEC — HIGH (ref 9.9–13.4)
RBC # BLD: 2.99 M/UL — LOW (ref 3.8–5.2)
RBC # FLD: 14.4 % — SIGNIFICANT CHANGE UP (ref 10.3–14.5)
SODIUM SERPL-SCNC: 133 MMOL/L — LOW (ref 135–145)
WBC # BLD: 7.38 K/UL — SIGNIFICANT CHANGE UP (ref 3.8–10.5)
WBC # FLD AUTO: 7.38 K/UL — SIGNIFICANT CHANGE UP (ref 3.8–10.5)

## 2025-01-03 PROCEDURE — 71045 X-RAY EXAM CHEST 1 VIEW: CPT | Mod: 26

## 2025-01-03 RX ORDER — LIDOCAINE 50 MG/G
1 OINTMENT TOPICAL EVERY 24 HOURS
Refills: 0 | Status: DISCONTINUED | OUTPATIENT
Start: 2025-01-03 | End: 2025-01-09

## 2025-01-03 RX ORDER — SODIUM CHLORIDE 9 MG/ML
750 INJECTION, SOLUTION INTRAVENOUS ONCE
Refills: 0 | Status: COMPLETED | OUTPATIENT
Start: 2025-01-03 | End: 2025-01-03

## 2025-01-03 RX ADMIN — CHLORHEXIDINE GLUCONATE 1 APPLICATION(S): 1.2 RINSE ORAL at 06:07

## 2025-01-03 RX ADMIN — SODIUM CHLORIDE 3 MILLILITER(S): 9 INJECTION, SOLUTION INTRAMUSCULAR; INTRAVENOUS; SUBCUTANEOUS at 07:22

## 2025-01-03 RX ADMIN — Medication 5 MILLIGRAM(S): at 16:30

## 2025-01-03 RX ADMIN — Medication 5 MILLIGRAM(S): at 16:16

## 2025-01-03 RX ADMIN — SODIUM CHLORIDE 500 MILLILITER(S): 9 INJECTION, SOLUTION INTRAVENOUS at 19:39

## 2025-01-03 RX ADMIN — Medication 20 MILLIGRAM(S): at 06:06

## 2025-01-03 RX ADMIN — Medication 5 MILLIGRAM(S): at 07:05

## 2025-01-03 RX ADMIN — Medication 81 MILLIGRAM(S): at 10:56

## 2025-01-03 RX ADMIN — CARVEDILOL 3.12 MILLIGRAM(S): 25 TABLET, FILM COATED ORAL at 06:06

## 2025-01-03 RX ADMIN — LATANOPROST 1 DROP(S): 50 SOLUTION OPHTHALMIC at 22:00

## 2025-01-03 RX ADMIN — SACUBITRIL AND VALSARTAN 1 TABLET(S): 24; 26 TABLET, FILM COATED ORAL at 07:05

## 2025-01-03 RX ADMIN — ATORVASTATIN CALCIUM 40 MILLIGRAM(S): 40 TABLET, FILM COATED ORAL at 21:30

## 2025-01-03 RX ADMIN — LIDOCAINE 1 PATCH: 50 OINTMENT TOPICAL at 16:17

## 2025-01-03 RX ADMIN — Medication 5 MILLIGRAM(S): at 08:00

## 2025-01-03 RX ADMIN — APIXABAN 2.5 MILLIGRAM(S): 5 TABLET, FILM COATED ORAL at 21:30

## 2025-01-03 RX ADMIN — APIXABAN 2.5 MILLIGRAM(S): 5 TABLET, FILM COATED ORAL at 10:56

## 2025-01-03 RX ADMIN — IPRATROPIUM BROMIDE AND ALBUTEROL SULFATE 3 MILLILITER(S): .5; 2.5 SOLUTION RESPIRATORY (INHALATION) at 10:55

## 2025-01-03 RX ADMIN — PANTOPRAZOLE 40 MILLIGRAM(S): 40 TABLET, DELAYED RELEASE ORAL at 06:06

## 2025-01-03 RX ADMIN — ACETAMINOPHEN 650 MILLIGRAM(S): 80 SOLUTION/ DROPS ORAL at 12:04

## 2025-01-03 RX ADMIN — POTASSIUM CHLORIDE 10 MILLIEQUIVALENT(S): 600 TABLET, FILM COATED, EXTENDED RELEASE ORAL at 10:56

## 2025-01-03 RX ADMIN — SODIUM CHLORIDE 3 MILLILITER(S): 9 INJECTION, SOLUTION INTRAMUSCULAR; INTRAVENOUS; SUBCUTANEOUS at 00:13

## 2025-01-03 RX ADMIN — Medication 17 GRAM(S): at 10:59

## 2025-01-03 RX ADMIN — LIDOCAINE 1 PATCH: 50 OINTMENT TOPICAL at 18:36

## 2025-01-03 RX ADMIN — SODIUM CHLORIDE 3 MILLILITER(S): 9 INJECTION, SOLUTION INTRAMUSCULAR; INTRAVENOUS; SUBCUTANEOUS at 13:30

## 2025-01-03 RX ADMIN — ACETAMINOPHEN 650 MILLIGRAM(S): 80 SOLUTION/ DROPS ORAL at 10:57

## 2025-01-03 RX ADMIN — SODIUM CHLORIDE 3 MILLILITER(S): 9 INJECTION, SOLUTION INTRAMUSCULAR; INTRAVENOUS; SUBCUTANEOUS at 21:24

## 2025-01-03 RX ADMIN — Medication 5 MILLIGRAM(S): at 00:51

## 2025-01-03 RX ADMIN — SENNOSIDES 2 TABLET(S): 8.6 TABLET, FILM COATED ORAL at 21:30

## 2025-01-03 NOTE — PROGRESS NOTE ADULT - SUBJECTIVE AND OBJECTIVE BOX
Patient discussed on morning rounds with       OPERATION & DATE:    SUBJECTIVE ASSESSMENT:    VITAL SIGNS:  Vital Signs Last 24 Hrs  T(C): 36.3 (03 Jan 2025 13:50), Max: 36.7 (03 Jan 2025 05:01)  T(F): 97.3 (03 Jan 2025 13:50), Max: 98 (03 Jan 2025 05:01)  HR: 90 (03 Jan 2025 14:06) (68 - 90)  BP: 96/55 (03 Jan 2025 14:06) (91/50 - 134/67)  BP(mean): 70 (03 Jan 2025 14:06) (63 - 93)  RR: 15 (03 Jan 2025 14:06) (14 - 19)  SpO2: 97% (03 Jan 2025 14:06) (96% - 100%)    Parameters below as of 03 Jan 2025 14:06  Patient On (Oxygen Delivery Method): room air      I&O's Detail    02 Jan 2025 07:01  -  03 Jan 2025 07:00  --------------------------------------------------------  IN:    Oral Fluid: 820 mL  Total IN: 820 mL    OUT:    Voided (mL): 450 mL  Total OUT: 450 mL    Total NET: 370 mL      03 Jan 2025 07:01  -  03 Jan 2025 14:42  --------------------------------------------------------  IN:    Oral Fluid: 400 mL  Total IN: 400 mL    OUT:    Voided (mL): 1400 mL  Total OUT: 1400 mL    Total NET: -1000 mL        CHEST TUBE:    MINDA DRAIN:    EPICARDIAL WIRES:   STITCHES:  STAPLES:  TRAN:   CENTRAL LINE:  MIDLINE/PICC:  WOUND VAC:    PHYSICAL EXAM:  General:  HEENT:  Cardio:  Pulm:  GI:  Extremities:  Vascular:  Incisions:    LABS:                        9.2    7.38  )-----------( 427      ( 03 Jan 2025 05:30 )             28.6     PT/INR - ( 03 Jan 2025 05:30 )   PT: 16.4 sec;   INR: 1.43          PTT - ( 03 Jan 2025 05:30 )  PTT:38.4 sec  01-03    133[L]  |  99  |  12  ----------------------------<  98  4.2   |  26  |  0.57    Ca    8.9      03 Jan 2025 05:30  Phos  3.6     01-03  Mg     1.9     01-03    TPro  6.2  /  Alb  3.2[L]  /  TBili  0.3  /  DBili  x   /  AST  28  /  ALT  11  /  AlkPhos  131[H]  01-03    Urinalysis Basic - ( 03 Jan 2025 05:30 )    Color: x / Appearance: x / SG: x / pH: x  Gluc: 98 mg/dL / Ketone: x  / Bili: x / Urobili: x   Blood: x / Protein: x / Nitrite: x   Leuk Esterase: x / RBC: x / WBC x   Sq Epi: x / Non Sq Epi: x / Bacteria: x      MEDICATIONS  (STANDING):  apixaban 2.5 milliGRAM(s) Oral every 12 hours  aspirin enteric coated 81 milliGRAM(s) Oral daily  atorvastatin 40 milliGRAM(s) Oral at bedtime  bisacodyl 5 milliGRAM(s) Oral at bedtime  carvedilol 3.125 milliGRAM(s) Oral every 12 hours  chlorhexidine 2% Cloths 1 Application(s) Topical daily  glucagon  Injectable 1 milliGRAM(s) IntraMuscular once  latanoprost 0.005% Ophthalmic Solution 1 Drop(s) Both EYES at bedtime  lidocaine   4% Patch 1 Patch Transdermal every 24 hours  pantoprazole    Tablet 40 milliGRAM(s) Oral before breakfast  polyethylene glycol 3350 17 Gram(s) Oral daily  sacubitril 49 mG/valsartan 51 mG 1 Tablet(s) Oral every 12 hours  senna 2 Tablet(s) Oral at bedtime  sodium chloride 0.9% lock flush 3 milliLiter(s) IV Push every 8 hours  sodium chloride 0.9%. 1000 milliLiter(s) (10 mL/Hr) IV Continuous <Continuous>    MEDICATIONS  (PRN):  acetaminophen     Tablet .. 650 milliGRAM(s) Oral every 6 hours PRN Moderate Pain (4 - 6)  melatonin 5 milliGRAM(s) Oral at bedtime PRN Sleep  oxyCODONE    IR 5 milliGRAM(s) Oral every 6 hours PRN Severe Pain (7 - 10)    RADIOLOGY & ADDITIONAL TESTS:       Patient discussed on morning rounds with Dr. Gray    OPERATION & DATE: 12/23/24: AVR, ascending aorta replacement, cryomaze and JIM appendage closure (35mm), EF 55-60%    SUBJECTIVE ASSESSMENT:    Patient reports ongoing, stable surgical incision site, left shoulder pain, and difficulty taking deep breaths. Spoke with patient and patient's son regarding the patient's symptoms, explained how the symptoms are normal in the setting of major cardiac surgery. Pulling 750 cc on IS. Denies dizziness, syncope, chest pain, weakness, numbness, fevers, dysuria, abdominal pain, N/V.     VITAL SIGNS:  Vital Signs Last 24 Hrs  T(C): 36.3 (03 Jan 2025 13:50), Max: 36.7 (03 Jan 2025 05:01)  T(F): 97.3 (03 Jan 2025 13:50), Max: 98 (03 Jan 2025 05:01)  HR: 90 (03 Jan 2025 14:06) (68 - 90)  BP: 96/55 (03 Jan 2025 14:06) (91/50 - 134/67)  BP(mean): 70 (03 Jan 2025 14:06) (63 - 93)  RR: 15 (03 Jan 2025 14:06) (14 - 19)  SpO2: 97% (03 Jan 2025 14:06) (96% - 100%)    Parameters below as of 03 Jan 2025 14:06  Patient On (Oxygen Delivery Method): room air      I&O's Detail    02 Jan 2025 07:01  -  03 Jan 2025 07:00  --------------------------------------------------------  IN:    Oral Fluid: 820 mL  Total IN: 820 mL    OUT:    Voided (mL): 450 mL  Total OUT: 450 mL    Total NET: 370 mL      03 Jan 2025 07:01  -  03 Jan 2025 14:42  --------------------------------------------------------  IN:    Oral Fluid: 400 mL  Total IN: 400 mL    OUT:    Voided (mL): 1400 mL  Total OUT: 1400 mL    Total NET: -1000 mL        CHEST TUBE: no   MINDA DRAIN: no  EPICARDIAL WIRES: no  STITCHES: no    PHYSICAL EXAM:  General: Sitting in bed comfortably in NAD  Neuro: A&Ox3, no focal deficits   HEENT: NCAT, EOMI   Cardiac: Regular rate and rhythm, normal S1 and S2. No m/r/g   Pulm: Breathing comfortably on room air. No signs of respiratory distress. Lungs are CTA b/l without wheezes, rales, or rhonchi   Abdomen: Soft, non-distended, non-tender. + bowel sounds   Extremities: Warm and well perfused, no peripheral edema, distal pulses 2+. No calf tenderness. SCDs and TEDs in place  MSK: Full AROM   Wound: MSI clean, dry, and intact. Well healing. Chest tube site are clean and healing.     LABS:                        9.2    7.38  )-----------( 427      ( 03 Jan 2025 05:30 )             28.6     PT/INR - ( 03 Jan 2025 05:30 )   PT: 16.4 sec;   INR: 1.43          PTT - ( 03 Jan 2025 05:30 )  PTT:38.4 sec  01-03    133[L]  |  99  |  12  ----------------------------<  98  4.2   |  26  |  0.57    Ca    8.9      03 Jan 2025 05:30  Phos  3.6     01-03  Mg     1.9     01-03    TPro  6.2  /  Alb  3.2[L]  /  TBili  0.3  /  DBili  x   /  AST  28  /  ALT  11  /  AlkPhos  131[H]  01-03    Urinalysis Basic - ( 03 Jan 2025 05:30 )    Color: x / Appearance: x / SG: x / pH: x  Gluc: 98 mg/dL / Ketone: x  / Bili: x / Urobili: x   Blood: x / Protein: x / Nitrite: x   Leuk Esterase: x / RBC: x / WBC x   Sq Epi: x / Non Sq Epi: x / Bacteria: x      MEDICATIONS  (STANDING):  apixaban 2.5 milliGRAM(s) Oral every 12 hours  aspirin enteric coated 81 milliGRAM(s) Oral daily  atorvastatin 40 milliGRAM(s) Oral at bedtime  bisacodyl 5 milliGRAM(s) Oral at bedtime  carvedilol 3.125 milliGRAM(s) Oral every 12 hours  chlorhexidine 2% Cloths 1 Application(s) Topical daily  glucagon  Injectable 1 milliGRAM(s) IntraMuscular once  latanoprost 0.005% Ophthalmic Solution 1 Drop(s) Both EYES at bedtime  lidocaine   4% Patch 1 Patch Transdermal every 24 hours  pantoprazole    Tablet 40 milliGRAM(s) Oral before breakfast  polyethylene glycol 3350 17 Gram(s) Oral daily  sacubitril 49 mG/valsartan 51 mG 1 Tablet(s) Oral every 12 hours  senna 2 Tablet(s) Oral at bedtime  sodium chloride 0.9% lock flush 3 milliLiter(s) IV Push every 8 hours  sodium chloride 0.9%. 1000 milliLiter(s) (10 mL/Hr) IV Continuous <Continuous>    MEDICATIONS  (PRN):  acetaminophen     Tablet .. 650 milliGRAM(s) Oral every 6 hours PRN Moderate Pain (4 - 6)  melatonin 5 milliGRAM(s) Oral at bedtime PRN Sleep  oxyCODONE    IR 5 milliGRAM(s) Oral every 6 hours PRN Severe Pain (7 - 10)    RADIOLOGY & ADDITIONAL TESTS:

## 2025-01-03 NOTE — PROGRESS NOTE ADULT - SUBJECTIVE AND OBJECTIVE BOX
Patient discussed on morning rounds with       OPERATION & DATE:    SUBJECTIVE ASSESSMENT:    VITAL SIGNS:  Vital Signs Last 24 Hrs  T(C): 36.3 (03 Jan 2025 13:50), Max: 36.7 (03 Jan 2025 05:01)  T(F): 97.3 (03 Jan 2025 13:50), Max: 98 (03 Jan 2025 05:01)  HR: 84 (03 Jan 2025 11:59) (68 - 88)  BP: 91/50 (03 Jan 2025 11:59) (91/50 - 134/67)  BP(mean): 63 (03 Jan 2025 11:59) (63 - 93)  RR: 18 (03 Jan 2025 11:59) (14 - 19)  SpO2: 96% (03 Jan 2025 11:59) (96% - 100%)    Parameters below as of 03 Jan 2025 11:59  Patient On (Oxygen Delivery Method): room air      I&O's Detail    02 Jan 2025 07:01  -  03 Jan 2025 07:00  --------------------------------------------------------  IN:    Oral Fluid: 820 mL  Total IN: 820 mL    OUT:    Voided (mL): 450 mL  Total OUT: 450 mL    Total NET: 370 mL      03 Jan 2025 07:01  -  03 Jan 2025 14:02  --------------------------------------------------------  IN:    Oral Fluid: 400 mL  Total IN: 400 mL    OUT:    Voided (mL): 1200 mL  Total OUT: 1200 mL    Total NET: -800 mL        CHEST TUBE:    MINDA DRAIN:    EPICARDIAL WIRES:   STITCHES:  STAPLES:  TRAN:   CENTRAL LINE:  MIDLINE/PICC:  WOUND VAC:    PHYSICAL EXAM:  General:  HEENT:  Cardio:  Pulm:  GI:  Extremities:  Vascular:  Incisions:    LABS:                        9.2    7.38  )-----------( 427      ( 03 Jan 2025 05:30 )             28.6     PT/INR - ( 03 Jan 2025 05:30 )   PT: 16.4 sec;   INR: 1.43          PTT - ( 03 Jan 2025 05:30 )  PTT:38.4 sec  01-03    133[L]  |  99  |  12  ----------------------------<  98  4.2   |  26  |  0.57    Ca    8.9      03 Jan 2025 05:30  Phos  3.6     01-03  Mg     1.9     01-03    TPro  6.2  /  Alb  3.2[L]  /  TBili  0.3  /  DBili  x   /  AST  28  /  ALT  11  /  AlkPhos  131[H]  01-03    Urinalysis Basic - ( 03 Jan 2025 05:30 )    Color: x / Appearance: x / SG: x / pH: x  Gluc: 98 mg/dL / Ketone: x  / Bili: x / Urobili: x   Blood: x / Protein: x / Nitrite: x   Leuk Esterase: x / RBC: x / WBC x   Sq Epi: x / Non Sq Epi: x / Bacteria: x      MEDICATIONS  (STANDING):  apixaban 2.5 milliGRAM(s) Oral every 12 hours  aspirin enteric coated 81 milliGRAM(s) Oral daily  atorvastatin 40 milliGRAM(s) Oral at bedtime  bisacodyl 5 milliGRAM(s) Oral at bedtime  carvedilol 3.125 milliGRAM(s) Oral every 12 hours  chlorhexidine 2% Cloths 1 Application(s) Topical daily  glucagon  Injectable 1 milliGRAM(s) IntraMuscular once  latanoprost 0.005% Ophthalmic Solution 1 Drop(s) Both EYES at bedtime  lidocaine   4% Patch 1 Patch Transdermal every 24 hours  pantoprazole    Tablet 40 milliGRAM(s) Oral before breakfast  polyethylene glycol 3350 17 Gram(s) Oral daily  sacubitril 49 mG/valsartan 51 mG 1 Tablet(s) Oral every 12 hours  senna 2 Tablet(s) Oral at bedtime  sodium chloride 0.9% lock flush 3 milliLiter(s) IV Push every 8 hours  sodium chloride 0.9%. 1000 milliLiter(s) (10 mL/Hr) IV Continuous <Continuous>    MEDICATIONS  (PRN):  acetaminophen     Tablet .. 650 milliGRAM(s) Oral every 6 hours PRN Moderate Pain (4 - 6)  melatonin 5 milliGRAM(s) Oral at bedtime PRN Sleep  oxyCODONE    IR 5 milliGRAM(s) Oral every 6 hours PRN Severe Pain (7 - 10)    RADIOLOGY & ADDITIONAL TESTS:

## 2025-01-03 NOTE — PROGRESS NOTE ADULT - ASSESSMENT
59 y/o Greek-speaking F with PMHx of HTN, HLD, Anemia, NSTEMI 2011 (no PCI), ?TIA in  (no deficits), pAfib (on Eliquis), HFpEF (EF 55-60%), gallstones who presented to [facility] for pre-op optimization. On 24 patient underwent sternotomy for AVR and ascending aorta replacement, cryo maze and JIM appendage closure (35mm). POD1 Stroke code called for lethargy, L sided weakness w/u negative. EEG for arm jerking- negative. On  and primacor. POD2 Extubated, HFNC for positive pressure.  weaned. POD3 Remained on low dose  and primacor. POD4  off, swan removed. Meds x 2 removed. weaned to NC. POD5 diuresed. Primacor weaned. POD6 diuresed. BB started. Conversion pause so pacing wires kept in. Pigtail placed for R pleural effusion. POD7 pigtail and wires removed. POCUS revealed small L pleural effusion with poor window. Transferred to [facility]. POD 8 Increased entresto to 49/51. Post-op TTE done with normal function of bioprosthetic valve. Statin re-started. Per PT/OT, patient should go to rehab. POD 9 restarted 1/2 dose eliquis per Dr. Arizmendi/Doreen given extensive aortic surgery. POD 10 TTE demonstrated no pericardial effusion after starting eliquis. POD 11 Stopped diuresis 2/2 >10 L out during admission and soft BP. Rehab acceptance pending.     Neuro:   No delirium and focal deficits.   Pain: prn tylenol and oxy with good pain control. Lidocaine patch for shoulder pain.   Insomnia: Melatonin       Cardio:  POD 11 s/p AVR in the setting of aortic stenosis     -c/w asa and 70 mg lipitor  HFpEF   - c/w entresto, coreg. Holding lasix at this time  Afib s/p Cryomaze and JIM   - c/2 2.5 mg Eliquis   HTN: c/w entresto and coreg   HLD: c/w 40 mg lipitor   Vitals stable over the last 24 hrs     Pulm:   IS encouraged. Sating at 96% on RA  CXR: stable, small L effusion unchanging via POCUS.     GI:   Tolerating diet well   Prophylaxis: 40 mg pantoprazole.   Bowel: Senna, dulcolax and PEG.      ID:   WBC 7. Afebrile.  Monitor fever curve     Renal:   BUN/Creat @ 12/0.57  I/O net: -1 L   Electrolytes: Replete electrolytes prn.     Hem:   H&H @ 9.2/28  DVT prophylaxis: Eliquis, therapeutic     Endo:   A1c: 6.2   TSH: 3.4     MSK:   Ambulating well. Continue with PT/OT.     Disposition:   Continue with inpatient care. Awaiting rehab placement

## 2025-01-04 LAB
ANION GAP SERPL CALC-SCNC: 9 MMOL/L — SIGNIFICANT CHANGE UP (ref 5–17)
BUN SERPL-MCNC: 15 MG/DL — SIGNIFICANT CHANGE UP (ref 7–23)
CALCIUM SERPL-MCNC: 9.1 MG/DL — SIGNIFICANT CHANGE UP (ref 8.4–10.5)
CHLORIDE SERPL-SCNC: 95 MMOL/L — LOW (ref 96–108)
CO2 SERPL-SCNC: 25 MMOL/L — SIGNIFICANT CHANGE UP (ref 22–31)
CREAT SERPL-MCNC: 0.75 MG/DL — SIGNIFICANT CHANGE UP (ref 0.5–1.3)
EGFR: 91 ML/MIN/1.73M2 — SIGNIFICANT CHANGE UP
GLUCOSE SERPL-MCNC: 101 MG/DL — HIGH (ref 70–99)
HCT VFR BLD CALC: 27.9 % — LOW (ref 34.5–45)
HGB BLD-MCNC: 9.1 G/DL — LOW (ref 11.5–15.5)
MAGNESIUM SERPL-MCNC: 2 MG/DL — SIGNIFICANT CHANGE UP (ref 1.6–2.6)
MCHC RBC-ENTMCNC: 31.5 PG — SIGNIFICANT CHANGE UP (ref 27–34)
MCHC RBC-ENTMCNC: 32.6 G/DL — SIGNIFICANT CHANGE UP (ref 32–36)
MCV RBC AUTO: 96.5 FL — SIGNIFICANT CHANGE UP (ref 80–100)
NRBC # BLD: 0 /100 WBCS — SIGNIFICANT CHANGE UP (ref 0–0)
PLATELET # BLD AUTO: 473 K/UL — HIGH (ref 150–400)
POTASSIUM SERPL-MCNC: 4.8 MMOL/L — SIGNIFICANT CHANGE UP (ref 3.5–5.3)
POTASSIUM SERPL-SCNC: 4.8 MMOL/L — SIGNIFICANT CHANGE UP (ref 3.5–5.3)
RBC # BLD: 2.89 M/UL — LOW (ref 3.8–5.2)
RBC # FLD: 14.5 % — SIGNIFICANT CHANGE UP (ref 10.3–14.5)
SODIUM SERPL-SCNC: 129 MMOL/L — LOW (ref 135–145)
WBC # BLD: 8.16 K/UL — SIGNIFICANT CHANGE UP (ref 3.8–10.5)
WBC # FLD AUTO: 8.16 K/UL — SIGNIFICANT CHANGE UP (ref 3.8–10.5)

## 2025-01-04 PROCEDURE — 99291 CRITICAL CARE FIRST HOUR: CPT

## 2025-01-04 RX ADMIN — SODIUM CHLORIDE 3 MILLILITER(S): 9 INJECTION, SOLUTION INTRAMUSCULAR; INTRAVENOUS; SUBCUTANEOUS at 22:14

## 2025-01-04 RX ADMIN — Medication 5 MILLIGRAM(S): at 11:28

## 2025-01-04 RX ADMIN — CHLORHEXIDINE GLUCONATE 1 APPLICATION(S): 1.2 RINSE ORAL at 06:13

## 2025-01-04 RX ADMIN — SENNOSIDES 2 TABLET(S): 8.6 TABLET, FILM COATED ORAL at 21:45

## 2025-01-04 RX ADMIN — Medication 5 MILLIGRAM(S): at 21:45

## 2025-01-04 RX ADMIN — PANTOPRAZOLE 40 MILLIGRAM(S): 40 TABLET, DELAYED RELEASE ORAL at 06:34

## 2025-01-04 RX ADMIN — ATORVASTATIN CALCIUM 40 MILLIGRAM(S): 40 TABLET, FILM COATED ORAL at 21:45

## 2025-01-04 RX ADMIN — Medication 5 MILLIGRAM(S): at 05:30

## 2025-01-04 RX ADMIN — LIDOCAINE 1 PATCH: 50 OINTMENT TOPICAL at 12:08

## 2025-01-04 RX ADMIN — Medication 5 MILLIGRAM(S): at 04:35

## 2025-01-04 RX ADMIN — LATANOPROST 1 DROP(S): 50 SOLUTION OPHTHALMIC at 22:13

## 2025-01-04 RX ADMIN — LIDOCAINE 1 PATCH: 50 OINTMENT TOPICAL at 04:32

## 2025-01-04 RX ADMIN — LIDOCAINE 1 PATCH: 50 OINTMENT TOPICAL at 19:05

## 2025-01-04 RX ADMIN — Medication 17 GRAM(S): at 12:08

## 2025-01-04 RX ADMIN — ACETAMINOPHEN 650 MILLIGRAM(S): 80 SOLUTION/ DROPS ORAL at 18:33

## 2025-01-04 RX ADMIN — APIXABAN 2.5 MILLIGRAM(S): 5 TABLET, FILM COATED ORAL at 10:15

## 2025-01-04 RX ADMIN — Medication 5 MILLIGRAM(S): at 10:58

## 2025-01-04 RX ADMIN — Medication 81 MILLIGRAM(S): at 12:08

## 2025-01-04 RX ADMIN — SODIUM CHLORIDE 3 MILLILITER(S): 9 INJECTION, SOLUTION INTRAMUSCULAR; INTRAVENOUS; SUBCUTANEOUS at 13:31

## 2025-01-04 RX ADMIN — ACETAMINOPHEN 650 MILLIGRAM(S): 80 SOLUTION/ DROPS ORAL at 18:03

## 2025-01-04 RX ADMIN — SACUBITRIL AND VALSARTAN 1 TABLET(S): 24; 26 TABLET, FILM COATED ORAL at 12:13

## 2025-01-04 RX ADMIN — SODIUM CHLORIDE 3 MILLILITER(S): 9 INJECTION, SOLUTION INTRAMUSCULAR; INTRAVENOUS; SUBCUTANEOUS at 06:07

## 2025-01-04 RX ADMIN — APIXABAN 2.5 MILLIGRAM(S): 5 TABLET, FILM COATED ORAL at 21:45

## 2025-01-04 RX ADMIN — SACUBITRIL AND VALSARTAN 1 TABLET(S): 24; 26 TABLET, FILM COATED ORAL at 22:14

## 2025-01-04 NOTE — PROGRESS NOTE ADULT - ASSESSMENT
59 y/o Sami-speaking F with PMHx of HTN, HLD, Anemia, NSTEMI 2011 (no PCI), ?TIA in  (no deficits), pAfib (on Eliquis), HFpEF (EF 55-60%), gallstones who presented to [facility] for pre-op optimization. On 24 patient underwent sternotomy for AVR and ascending aorta replacement, cryo maze and JIM appendage closure (35mm). POD1 Stroke code called for lethargy, L sided weakness w/u negative. EEG for arm jerking- negative. On  and primacor. POD2 Extubated, HFNC for positive pressure.  weaned. POD3 Remained on low dose  and primacor. POD4  off, swan removed. Meds x 2 removed. weaned to NC. POD5 diuresed. Primacor weaned. POD6 diuresed. BB started. Conversion pause so pacing wires kept in. Pigtail placed for R pleural effusion. POD7 pigtail and wires removed. POCUS revealed small L pleural effusion with poor window. Transferred to [facility]. POD 8 Increased entresto to 49/51. Post-op TTE done with normal function of bioprosthetic valve. Statin re-started. Per PT/OT, patient should go to rehab. Starting authorization. POD 9 restarted 1/2 dose eliquis per Dr. Arizmendi/Doreen given extensive aortic surgery, TTE limited for effusion ordered for tomorrow after initiation. Clarify need for rehab vs home with PT tomorrow, rehab process has not been started.     Plan:  Neurovascular:   -Pain regimen: lidocaine patch     -PRN's: acetaminophen, oxycodone   -Insomnia: melatonin    HEENT:  -home eye drops: latanoprost b/l    Cardiovascular:   -POD13 AVR, ascending, cryomaze, JIM closure, EF normal     -continue with ASA, Statin 40mg daily    -drains and wires all removed   -Conversion Pause:    -6sec pause overnight    -EP consulted, pending recs    -holding Coreg for now     -ghluam 50-60s   -HFpEF     -continue with Coreg, Entresto  -Afib: eliquis 2.5mg Q12 hours restarted today (1/2 dose 2/2 to high risk of bleeding given extensive aortic surgery)    -coreg held for now given 6sec pause overnight   -Hemodynamically stable.   -Monitor: BP, HR, tele    Respiratory:   -Oxygenating well on room air   -Encourage continued use of IS 10x/hr and frequent ambulation  -CXR: small left effusion, stable     GI:  -elevated alk phos: downtrending   -GI PPX: pantoprazole 40mg daily   -PO Diet: DASH/TLC   -Bowel Regimen: senna, miralax     Renal / :  -Diuresis plan: on hold, overdiuresis yesterday   -BUN/Cr 15/0.61   -Monitor I/O's daily     Endocrine:    -A1c: 6.2   -TSH: 3.4     Hematologic:  -CBC: H/H- .     ID:  -Temperature: afebrile   -CBC: WBC- 8.0    Prophylaxis:  -DVT prophylaxis with eliquis 2.5mg Q12 hours   -Continue with SCD's b/l while patient is at rest     Disposition:  -pending EP recs, will be discharged to rehab  57 y/o Romanian-speaking F with PMHx of HTN, HLD, Anemia, NSTEMI 2011 (no PCI), ?TIA in  (no deficits), pAfib (on Eliquis), HFpEF (EF 55-60%), gallstones who presented to [facility] for pre-op optimization. On 24 patient underwent sternotomy for AVR and ascending aorta replacement, cryo maze and JIM appendage closure (35mm). POD1 Stroke code called for lethargy, L sided weakness w/u negative. EEG for arm jerking- negative. On  and primacor. POD2 Extubated, HFNC for positive pressure.  weaned. POD3 Remained on low dose  and primacor. POD4  off, swan removed. Meds x 2 removed. weaned to NC. POD5 diuresed. Primacor weaned. POD6 diuresed. BB started. Conversion pause so pacing wires kept in. Pigtail placed for R pleural effusion. POD7 pigtail and wires removed. POCUS revealed small L pleural effusion with poor window. Transferred to [facility]. POD 8 Increased entresto to 49/51. Post-op TTE done with normal function of bioprosthetic valve. Statin re-started. Per PT/OT, patient should go to rehab. Starting authorization. POD 9 restarted 1/2 dose eliquis per Dr. Arizmendi/Doreen given extensive aortic surgery, TTE limited for effusion ordered for tomorrow after initiation. Pt was pending rehab but on POD13 pt with conversion pause in the morning, EP consulted, BB discontinued.     Plan:  Neurovascular:   -Pain regimen: lidocaine patch     -PRN's: acetaminophen, oxycodone   -Insomnia: melatonin    HEENT:  -home eye drops: latanoprost b/l    Cardiovascular:   -POD13 AVR, ascending, cryomaze, JIM closure, EF normal     -continue with ASA, Statin 40mg daily    -drains and wires all removed   -Conversion Pause:    -6sec pause overnight    -EP consulted, pending recs    -holding Coreg for now     -ghulam 50-60s   -HFpEF     -continue with Coreg, Entresto  -Afib: eliquis 2.5mg Q12 hours restarted today (1/2 dose 2/2 to high risk of bleeding given extensive aortic surgery)    -coreg held for now given 6sec pause overnight   -Hemodynamically stable.   -Monitor: BP, HR, tele    Respiratory:   -Oxygenating well on room air   -Encourage continued use of IS 10x/hr and frequent ambulation  -CXR: small left effusion, stable     GI:  -elevated alk phos: downtrending   -GI PPX: pantoprazole 40mg daily   -PO Diet: DASH/TLC   -Bowel Regimen: senna, miralax     Renal / :  -Diuresis plan: on hold, overdiuresis yesterday   -BUN/Cr 15/0.61   -Monitor I/O's daily     Endocrine:    -A1c: 6.2   -TSH: 3.4     Hematologic:  -CBC: H/H- 8.     ID:  -Temperature: afebrile   -CBC: WBC- 8.0    Prophylaxis:  -DVT prophylaxis with eliquis 2.5mg Q12 hours   -Continue with SCD's b/l while patient is at rest     Disposition:  -pending EP recs, will be discharged to rehab  59 y/o Yi-speaking F with PMHx of HTN, HLD, Anemia, NSTEMI 2011 (no PCI), ?TIA in  (no deficits), pAfib (on Eliquis), HFpEF (EF 55-60%), gallstones who presented to [facility] for pre-op optimization. On 24 patient underwent sternotomy for AVR and ascending aorta replacement, cryo maze and JIM appendage closure (35mm). POD1 Stroke code called for lethargy, L sided weakness w/u negative. EEG for arm jerking- negative. On  and primacor. POD2 Extubated, HFNC for positive pressure.  weaned. POD3 Remained on low dose  and primacor. POD4  off, swan removed. Meds x 2 removed. weaned to NC. POD5 diuresed. Primacor weaned. POD6 diuresed. BB started. Conversion pause so pacing wires kept in. Pigtail placed for R pleural effusion. POD7 pigtail and wires removed. POCUS revealed small L pleural effusion with poor window. Transferred to [facility]. POD 8 Increased entresto to 49/51. Post-op TTE done with normal function of bioprosthetic valve. Statin re-started. Per PT/OT, patient should go to rehab. Starting authorization. POD 9 restarted 1/2 dose eliquis per Dr. Arizmendi/Doreen given extensive aortic surgery. [id number] hypotension 2/2 to overdiuresis and diuretics held. Pt was pending rehab but on POD13 pt with conversion pause in the morning, EP consulted, BB discontinued.     Plan:  Neurovascular:   -Pain regimen: lidocaine patch     -PRN's: acetaminophen, oxycodone   -Insomnia: melatonin    HEENT:  -home eye drops: latanoprost b/l    Cardiovascular:   -POD13 AVR, ascending, cryomaze, JIM closure, EF normal     -continue with ASA, Statin 40mg daily    -drains and wires all removed   -Conversion Pause:    -6sec pause overnight    -EP consulted, pending recs    -holding Coreg for now     -ghulam 50-60s   -HFpEF     -continue with Coreg, Entresto  -Afib: eliquis 2.5mg Q12 hours restarted today (1/2 dose 2/2 to high risk of bleeding given extensive aortic surgery)    -coreg held for now given 6sec pause overnight   -Hemodynamically stable.   -Monitor: BP, HR, tele    Respiratory:   -Oxygenating well on room air   -Encourage continued use of IS 10x/hr and frequent ambulation  -CXR: small left effusion, stable     GI:  -elevated alk phos: downtrending   -GI PPX: pantoprazole 40mg daily   -PO Diet: DASH/TLC   -Bowel Regimen: senna, miralax     Renal / :  -Diuresis plan: on hold, overdiuresis yesterday   -BUN/Cr 15/0.61   -Monitor I/O's daily     Endocrine:    -A1c: 6.2   -TSH: 3.4     Hematologic:  -CBC: H/H- 8.     ID:  -Temperature: afebrile   -CBC: WBC- 8.0    Prophylaxis:  -DVT prophylaxis with eliquis 2.5mg Q12 hours   -Continue with SCD's b/l while patient is at rest     Disposition:  -pending EP recs, will be discharged to rehab

## 2025-01-04 NOTE — CONSULT NOTE ADULT - ATTENDING COMMENTS
58 YO F with a history of CAD, PAF, HTN, and severe AI with dilated aorta who presented for schedule surgery and now s/p bioAVR with ascending aorta replacement on 12/23 who is recovering well. She has preserved biventricular function and carries a diagnosis of HFpEF but unclear if this was not just symptomatic severe AI. She was on low dose carvedilol/entresto at home and reasonable to resume. Would stop metoprolol/hydralizine after these medications are added. She is euvolemic off diuretics. Hypontremia management per CTU/nephrology.
Asked to see Ms Gonzalez for tachy-ghulam syndrome and symptomatic bradycardia.  She is a 58 year old woman with a history of HFpEF, atrial fibrillation and hypertension that was referred to Dr Gray for BioAVR after presenting with severe aortic regurgitation.  Preoperative echo demonstrated severe left atrial dilatation and severe AR.  She underwent BioAVR and aortic root replacement along with MAZE/JIM ligation- atriclip (12/23)  During the post operative period she has had PAF as well as sinus bradycardia with a junctional escape rhythm.  Overnight she had a 6 second post termination pause following an episode of atrial arrhythmia.  This pause was followed by a secondary pause with wide complex escape rhythm.  She is currently in sinus rhythm and reports feeling weak and tired since her surgery.  On exam, she is a small and frail woman, lungs are clear, sternotomy looks like it is healing nicely.    Impression  1. Tachy-ghulam syndrome with symptomatic bradycardia and six second post termination pause as well as a secondary pause.  2. Status post BioAVR and aortic root replacement for severe AR  3. History of AF status post MAZE/Atriclip   4. HTN  5. HFpEF    Plan  Ms. Gonzalez suffers from tachy-ghulam syndrome and would benefit from dual chamber PPM.  Hold low dose coreg for now. Hold Sunday night dose of eliquis and keep NPO after midnight Sunday for PPM Monday.  I reviewed the plan with the patient and her son who we contacted by telephone while at the bedside.

## 2025-01-04 NOTE — CONSULT NOTE ADULT - ASSESSMENT
58 year old Slovak speaking female with history of HTN, HLD (not on statin 2/2 elevated LFT's), Anemia, NSTEMI 2011 (no PCI), ? TIA in 2011 (no deficits), pAfib (on Eliquis), HFpEF (EF 55-60%), gallstones, presented for bio AVR, [place], [facility]AAO with Dr. Gray on 12/23 c/b sinus bradycardia as well as long conversion pause following episode of atrial fibrillation. EP consulted for further management.     - the patient has significant sinus node dysfunction that appears to be symptomatic manifesting as resting sinus bradycardia and significant conversion pauses. In addition her sinus node pathology will make treatment of atrial fibrillation challenging   - recommend permanent pacemaker implantation, dual chamber, for treatment of sick sinus syndrome, tentative plan for monday   - discontinue carvedilol for now, avoid AV neha blocking agents   - keep NPO at midnight sunday evening   - maintain active type and screen and ensure patient has left sided IV   - hold apixaban Sunday   - continue to monitor on telemetry

## 2025-01-04 NOTE — PROGRESS NOTE ADULT - SUBJECTIVE AND OBJECTIVE BOX
Patient discussed on morning rounds with Dr. Logan    OPERATION & DATE: 12/23/24 - AVR, ascending aorta replacement, cryomaze, JIM closure (35mm device), EF normal     SUBJECTIVE ASSESSMENT: Lola : ___  Pt is feeling okay this morning, no complaints. Otherwise progressing well and Denies any chest pain, palpitations, orthopnea, dyspnea on exertion, shortness of breath, wheezing, abd pain, nausea, vomiting, constipation, lightheadedness, headaches, fevers, or chills.    VITAL SIGNS:  Vital Signs Last 24 Hrs  T(C): 37.1 (04 Jan 2025 05:01), Max: 37.1 (04 Jan 2025 05:01)  T(F): 98.7 (04 Jan 2025 05:01), Max: 98.7 (04 Jan 2025 05:01)  HR: 58 (04 Jan 2025 07:14) (0 - 102)  BP: 113/57 (04 Jan 2025 07:14) (74/49 - 156/80)  BP(mean): 77 (04 Jan 2025 07:14) (57 - 98)  RR: 15 (04 Jan 2025 07:14) (14 - 19)  SpO2: 97% (04 Jan 2025 07:14) (96% - 98%)    Parameters below as of 04 Jan 2025 07:14  Patient On (Oxygen Delivery Method): room air      I&O's Detail    03 Jan 2025 07:01  -  04 Jan 2025 07:00  --------------------------------------------------------  IN:    Oral Fluid: 1300 mL  Total IN: 1300 mL    OUT:    Voided (mL): 4500 mL  Total OUT: 4500 mL    Total NET: -3200 mL        CHEST TUBE:    MINDA DRAIN:    EPICARDIAL WIRES:   STITCHES:  STAPLES:  TRAN:   CENTRAL LINE:  MIDLINE/PICC:  WOUND VAC:    PHYSICAL EXAM:  General:  HEENT:  Cardio:  Pulm:  GI:  Extremities:  Vascular:  Incisions:    LABS:                        9.1    8.16  )-----------( 473      ( 04 Jan 2025 06:36 )             27.9     PT/INR - ( 03 Jan 2025 05:30 )   PT: 16.4 sec;   INR: 1.43          PTT - ( 03 Jan 2025 05:30 )  PTT:38.4 sec  01-04    129[L]  |  95[L]  |  15  ----------------------------<  101[H]  4.8   |  25  |  0.75    Ca    9.1      04 Jan 2025 06:36  Phos  3.6     01-03  Mg     2.0     01-04    TPro  6.2  /  Alb  3.2[L]  /  TBili  0.3  /  DBili  x   /  AST  28  /  ALT  11  /  AlkPhos  131[H]  01-03    Urinalysis Basic - ( 04 Jan 2025 06:36 )    Color: x / Appearance: x / SG: x / pH: x  Gluc: 101 mg/dL / Ketone: x  / Bili: x / Urobili: x   Blood: x / Protein: x / Nitrite: x   Leuk Esterase: x / RBC: x / WBC x   Sq Epi: x / Non Sq Epi: x / Bacteria: x      MEDICATIONS  (STANDING):  apixaban 2.5 milliGRAM(s) Oral every 12 hours  aspirin enteric coated 81 milliGRAM(s) Oral daily  atorvastatin 40 milliGRAM(s) Oral at bedtime  bisacodyl 5 milliGRAM(s) Oral at bedtime  carvedilol 3.125 milliGRAM(s) Oral every 12 hours  chlorhexidine 2% Cloths 1 Application(s) Topical daily  glucagon  Injectable 1 milliGRAM(s) IntraMuscular once  latanoprost 0.005% Ophthalmic Solution 1 Drop(s) Both EYES at bedtime  lidocaine   4% Patch 1 Patch Transdermal every 24 hours  pantoprazole    Tablet 40 milliGRAM(s) Oral before breakfast  polyethylene glycol 3350 17 Gram(s) Oral daily  sacubitril 49 mG/valsartan 51 mG 1 Tablet(s) Oral every 12 hours  senna 2 Tablet(s) Oral at bedtime  sodium chloride 0.9% lock flush 3 milliLiter(s) IV Push every 8 hours  sodium chloride 0.9%. 1000 milliLiter(s) (10 mL/Hr) IV Continuous <Continuous>    MEDICATIONS  (PRN):  acetaminophen     Tablet .. 650 milliGRAM(s) Oral every 6 hours PRN Moderate Pain (4 - 6)  melatonin 5 milliGRAM(s) Oral at bedtime PRN Sleep  oxyCODONE    IR 5 milliGRAM(s) Oral every 6 hours PRN Severe Pain (7 - 10)    RADIOLOGY & ADDITIONAL TESTS:     Patient discussed on morning rounds with Dr. Logan    OPERATION & DATE: 12/23/24 - AVR, ascending aorta replacement, cryomaze, JIM closure (35mm device), EF normal     SUBJECTIVE ASSESSMENT: Lola : [id number] Aureliano   Pt is feeling okay this morning, no complaints other than some mild dizziness. Otherwise progressing well and Denies any chest pain, palpitations, orthopnea, dyspnea on exertion, shortness of breath, wheezing, abd pain, nausea, vomiting, constipation, lightheadedness, headaches, fevers, or chills.    VITAL SIGNS:  Vital Signs Last 24 Hrs  T(C): 37.1 (04 Jan 2025 05:01), Max: 37.1 (04 Jan 2025 05:01)  T(F): 98.7 (04 Jan 2025 05:01), Max: 98.7 (04 Jan 2025 05:01)  HR: 58 (04 Jan 2025 07:14) (0 - 102)  BP: 113/57 (04 Jan 2025 07:14) (74/49 - 156/80)  BP(mean): 77 (04 Jan 2025 07:14) (57 - 98)  RR: 15 (04 Jan 2025 07:14) (14 - 19)  SpO2: 97% (04 Jan 2025 07:14) (96% - 98%)    Parameters below as of 04 Jan 2025 07:14  Patient On (Oxygen Delivery Method): room air      I&O's Detail    03 Jan 2025 07:01  -  04 Jan 2025 07:00  --------------------------------------------------------  IN:    Oral Fluid: 1300 mL  Total IN: 1300 mL    OUT:    Voided (mL): 4500 mL  Total OUT: 4500 mL    Total NET: -3200 mL    CHEST TUBE:  no  MINDA DRAIN:  no  EPICARDIAL WIRES: no   STITCHES: no  STAPLES: no  TRAN: no  CENTRAL LINE: no  MIDLINE/PICC: no  WOUND VAC: no    PHYSICAL EXAM:  General: well appearing sitting in bed in NAD   HEENT: normocephalic  Cardio: s1/s2   Pulm: lungs cta   GI: +BS 4 quadrants   Extremities: no edema, no calf tenderness   Vascular: dp 2+ b/l   Incisions: sternotomy incision without erythema, purulence erythema     LABS:             9.1    8.16  )-----------( 473      ( 04 Jan 2025 06:36 )             27.9     PT/INR - ( 03 Jan 2025 05:30 )   PT: 16.4 sec;   INR: 1.43     PTT - ( 03 Jan 2025 05:30 )  PTT:38.4 sec    01-04    129[L]  |  95[L]  |  15  ----------------------------<  101[H]  4.8   |  25  |  0.75    Ca    9.1      04 Jan 2025 06:36  Phos  3.6     01-03  Mg     2.0     01-04    TPro  6.2  /  Alb  3.2[L]  /  TBili  0.3  /  DBili  x   /  AST  28  /  ALT  11  /  AlkPhos  131[H]  01-03    Urinalysis Basic - ( 04 Jan 2025 06:36 )    Color: x / Appearance: x / SG: x / pH: x  Gluc: 101 mg/dL / Ketone: x  / Bili: x / Urobili: x   Blood: x / Protein: x / Nitrite: x   Leuk Esterase: x / RBC: x / WBC x   Sq Epi: x / Non Sq Epi: x / Bacteria: x      MEDICATIONS  (STANDING):  apixaban 2.5 milliGRAM(s) Oral every 12 hours  aspirin enteric coated 81 milliGRAM(s) Oral daily  atorvastatin 40 milliGRAM(s) Oral at bedtime  bisacodyl 5 milliGRAM(s) Oral at bedtime  carvedilol 3.125 milliGRAM(s) Oral every 12 hours  chlorhexidine 2% Cloths 1 Application(s) Topical daily  glucagon  Injectable 1 milliGRAM(s) IntraMuscular once  latanoprost 0.005% Ophthalmic Solution 1 Drop(s) Both EYES at bedtime  lidocaine   4% Patch 1 Patch Transdermal every 24 hours  pantoprazole    Tablet 40 milliGRAM(s) Oral before breakfast  polyethylene glycol 3350 17 Gram(s) Oral daily  sacubitril 49 mG/valsartan 51 mG 1 Tablet(s) Oral every 12 hours  senna 2 Tablet(s) Oral at bedtime  sodium chloride 0.9% lock flush 3 milliLiter(s) IV Push every 8 hours  sodium chloride 0.9%. 1000 milliLiter(s) (10 mL/Hr) IV Continuous <Continuous>    MEDICATIONS  (PRN):  acetaminophen     Tablet .. 650 milliGRAM(s) Oral every 6 hours PRN Moderate Pain (4 - 6)  melatonin 5 milliGRAM(s) Oral at bedtime PRN Sleep  oxyCODONE    IR 5 milliGRAM(s) Oral every 6 hours PRN Severe Pain (7 - 10)    RADIOLOGY & ADDITIONAL TESTS:  < from: Xray Chest 1 View- PORTABLE-Routine (Xray Chest 1 View- PORTABLE-Routine in AM.) (01.03.25 @ 05:45) >  IMPRESSION:  Slight decrease in previous noted small left pleural effusion. No other   significant interval change  < end of copied text >

## 2025-01-04 NOTE — CONSULT NOTE ADULT - SUBJECTIVE AND OBJECTIVE BOX
Patient seen and evaluated at bedside    Reason for Consult:    HPI:  58 year old Kyrgyz speaking female with history of HTN, HLD (not on statin 2/2 elevated LFT's), Anemia, NSTEMI 2011 (no PCI), ? TIA in 2011 (no deficits), pAfib (on Eliquis), HFpEF (EF 55-60%), gallstones, presented for bio AVR, [place], [facility] with Dr. Gray on 12/23.  On 12/23/24 patient underwent sternotomy for AVR and ascending aorta replacement, cryo maze and JIM appendage closure (35mm). POD1 Stroke code called for lethargy, L sided weakness w/u negative. Has been weaned off of milrinone / dobutamine and started on low dose carvedilol. Post-op TTE done with normal function of bioprosthetic valve and normal EF. Awaiting rehab. Review of telemetry reveals ongoing sinus bradycardia with competing junctional rhythm. on 1/3 the patient went into atrial fibrillation, remained in fibrillation until 1/4 at 6am with corresponding conversion pause - 6 seconds - with wide complex escape and multiple subsequent pauses before resumption of normal sinus rhythm. The patient states that she has been feeling weak and fatigued as well as been experiencing episodic dizziness which she attributes to low blood pressure.     PMHx:   HTN (hypertension)  HLD (hyperlipidemia)  GERD (gastroesohageal reflux disease)  Anemia  MI (myocardial infarction)  Paroxysmal atrial fibrillation  Severe aortic regurgitation    PSHx:   No significant past surgical history  Cyst of left eyelid    Allergies:  No Known Allergies    Current Medications:   acetaminophen     Tablet .. 650 milliGRAM(s) Oral every 6 hours PRN  apixaban 2.5 milliGRAM(s) Oral every 12 hours  aspirin enteric coated 81 milliGRAM(s) Oral daily  atorvastatin 40 milliGRAM(s) Oral at bedtime  bisacodyl 5 milliGRAM(s) Oral at bedtime  carvedilol 3.125 milliGRAM(s) Oral every 12 hours  chlorhexidine 2% Cloths 1 Application(s) Topical daily  glucagon  Injectable 1 milliGRAM(s) IntraMuscular once  latanoprost 0.005% Ophthalmic Solution 1 Drop(s) Both EYES at bedtime  lidocaine   4% Patch 1 Patch Transdermal every 24 hours  melatonin 5 milliGRAM(s) Oral at bedtime PRN  oxyCODONE    IR 5 milliGRAM(s) Oral every 6 hours PRN  pantoprazole    Tablet 40 milliGRAM(s) Oral before breakfast  polyethylene glycol 3350 17 Gram(s) Oral daily  sacubitril 49 mG/valsartan 51 mG 1 Tablet(s) Oral every 12 hours  senna 2 Tablet(s) Oral at bedtime  sodium chloride 0.9% lock flush 3 milliLiter(s) IV Push every 8 hours  sodium chloride 0.9%. 1000 milliLiter(s) IV Continuous <Continuous>      REVIEW OF SYSTEMS:  CONSTITUTIONAL: No fevers or chills  EYES/ENT: No visual changes;  No dysphagia  NECK: No pain or stiffness  RESPIRATORY: No cough, wheezing, hemoptysis; No shortness of breath  CARDIOVASCULAR: No chest pain or palpitations; No lower extremity edema  GASTROINTESTINAL: No abdominal or epigastric pain. No nausea, vomiting, or hematemesis; No diarrhea or constipation. No melena or hematochezia.  BACK: No back pain  GENITOURINARY: No dysuria, frequency or hematuria  NEUROLOGICAL: No numbness or weakness  SKIN: No itching, burning, rashes, or lesions   All other review of systems is negative unless indicated above.    Physical Exam:  T(F): 97.9 (01-04), Max: 98.7 (01-04)  HR: 58 (01-04) (0 - 102)  BP: 95/56 (01-04) (74/49 - 156/80)  RR: 14 (01-04)  SpO2: 98% (01-04)  GENERAL: No acute distress, well-developed  HEAD:  Atraumatic, Normocephalic  ENT: EOMI, PERRLA, conjunctiva and sclera clear, Neck supple, No JVD, moist mucosa  CHEST/LUNG: Clear to auscultation bilaterally; No wheeze, equal breath sounds bilaterally   BACK: No spinal tenderness  HEART: Regular rhythm; No murmurs, rubs, or gallops  ABDOMEN: Soft, Nontender, Nondistended; Bowel sounds present  EXTREMITIES:  No clubbing, cyanosis, or edema  PSYCH: Nl behavior, nl affect  NEUROLOGY: AAOx3, non-focal, cranial nerves intact  SKIN: Normal color, No rashes or lesions  LINES:    Cardiovascular Diagnostic Testing:    CXR: Personally reviewed    Labs: Personally reviewed                        9.1    8.16  )-----------( 473      ( 04 Jan 2025 06:36 )             27.9     01-04    129[L]  |  95[L]  |  15  ----------------------------<  101[H]  4.8   |  25  |  0.75    Ca    9.1      04 Jan 2025 06:36  Phos  3.6     01-03  Mg     2.0     01-04    TPro  6.2  /  Alb  3.2[L]  /  TBili  0.3  /  DBili  x   /  AST  28  /  ALT  11  /  AlkPhos  131[H]  01-03    PT/INR - ( 03 Jan 2025 05:30 )   PT: 16.4 sec;   INR: 1.43          PTT - ( 03 Jan 2025 05:30 )  PTT:38.4 sec                     Patient seen and evaluated at bedside    Reason for Consult:    HPI:  58 year old Frisian speaking female with history of HTN, HLD (not on statin 2/2 elevated LFT's), Anemia, NSTEMI 2011 (no PCI), ? TIA in 2011 (no deficits), pAfib (on Eliquis), HFpEF (EF 55-60%), gallstones, presented for bio AVR, [place], [facility] with Dr. Gray on 12/23.  On 12/23/24 patient underwent sternotomy for AVR and ascending aorta replacement, cryo maze and JIM appendage closure (35mm). POD1 Stroke code called for lethargy, L sided weakness w/u negative. Has been weaned off of milrinone / dobutamine and started on low dose carvedilol. Post-op TTE done with normal function of bioprosthetic valve and normal EF. Awaiting rehab. Review of telemetry reveals ongoing sinus bradycardia with competing junctional rhythm. on 1/3 the patient went into atrial fibrillation, remained in fibrillation until 1/4 at 6am with corresponding conversion pause - 6 seconds - with wide complex escape and multiple subsequent pauses before resumption of normal sinus rhythm. The patient states that she has been feeling weak and fatigued as well as been experiencing episodic dizziness which she attributes to low blood pressure.     PMHx:   HTN (hypertension)  HLD (hyperlipidemia)  GERD (gastroesohageal reflux disease)  Anemia  MI (myocardial infarction)  Paroxysmal atrial fibrillation  Severe aortic regurgitation    PSHx:   No significant past surgical history  Cyst of left eyelid    Allergies:  No Known Allergies    Current Medications:   acetaminophen     Tablet .. 650 milliGRAM(s) Oral every 6 hours PRN  apixaban 2.5 milliGRAM(s) Oral every 12 hours  aspirin enteric coated 81 milliGRAM(s) Oral daily  atorvastatin 40 milliGRAM(s) Oral at bedtime  bisacodyl 5 milliGRAM(s) Oral at bedtime  carvedilol 3.125 milliGRAM(s) Oral every 12 hours  chlorhexidine 2% Cloths 1 Application(s) Topical daily  glucagon  Injectable 1 milliGRAM(s) IntraMuscular once  latanoprost 0.005% Ophthalmic Solution 1 Drop(s) Both EYES at bedtime  lidocaine   4% Patch 1 Patch Transdermal every 24 hours  melatonin 5 milliGRAM(s) Oral at bedtime PRN  oxyCODONE    IR 5 milliGRAM(s) Oral every 6 hours PRN  pantoprazole    Tablet 40 milliGRAM(s) Oral before breakfast  polyethylene glycol 3350 17 Gram(s) Oral daily  sacubitril 49 mG/valsartan 51 mG 1 Tablet(s) Oral every 12 hours  senna 2 Tablet(s) Oral at bedtime  sodium chloride 0.9% lock flush 3 milliLiter(s) IV Push every 8 hours  sodium chloride 0.9%. 1000 milliLiter(s) IV Continuous <Continuous>      REVIEW OF SYSTEMS:  CONSTITUTIONAL: No fevers or chills  EYES/ENT: No visual changes;  No dysphagia  NECK: No pain or stiffness  RESPIRATORY: No cough, wheezing, hemoptysis; No shortness of breath  CARDIOVASCULAR: No chest pain or palpitations; No lower extremity edema  GASTROINTESTINAL: No abdominal or epigastric pain. No nausea, vomiting, or hematemesis; No diarrhea or constipation. No melena or hematochezia.  BACK: No back pain  GENITOURINARY: No dysuria, frequency or hematuria  NEUROLOGICAL: No numbness or weakness  SKIN: No itching, burning, rashes, or lesions   All other review of systems is negative unless indicated above.    Physical Exam:  T(F): 97.9 (01-04), Max: 98.7 (01-04)  HR: 58 (01-04) (0 - 102)  BP: 95/56 (01-04) (74/49 - 156/80)  RR: 14 (01-04)  SpO2: 98% (01-04)  GENERAL: No acute distress, well-developed  HEAD:  Atraumatic, Normocephalic  ENT: EOMI, PERRLA, conjunctiva and sclera clear, Neck supple, No JVD, moist mucosa  CHEST/LUNG: Clear to auscultation bilaterally; No wheeze, equal breath sounds bilaterally   BACK: No spinal tenderness  HEART: Regular rhythm; No murmurs, rubs, or gallops  ABDOMEN: Soft, Nontender, Nondistended; Bowel sounds present  EXTREMITIES:  No clubbing, cyanosis, or edema  PSYCH: Nl behavior, nl affect  NEUROLOGY: AAOx3, non-focal, cranial nerves intact  SKIN: Normal color, No rashes or lesions  LINES:    Cardiovascular Diagnostic Testing:    CXR: Personally reviewed    Labs: Personally reviewed                        9.1    8.16  )-----------( 473      ( 04 Jan 2025 06:36 )             27.9     01-04    129[L]  |  95[L]  |  15  ----------------------------<  101[H]  4.8   |  25  |  0.75    Ca    9.1      04 Jan 2025 06:36  Phos  3.6     01-03  Mg     2.0     01-04    TPro  6.2  /  Alb  3.2[L]  /  TBili  0.3  /  DBili  x   /  AST  28  /  ALT  11  /  AlkPhos  131[H]  01-03    PT/INR - ( 03 Jan 2025 05:30 )   PT: 16.4 sec;   INR: 1.43          PTT - ( 03 Jan 2025 05:30 )  PTT:38.4 sec

## 2025-01-05 LAB
ALBUMIN SERPL ELPH-MCNC: 4 G/DL — SIGNIFICANT CHANGE UP (ref 3.3–5)
ALP SERPL-CCNC: 190 U/L — HIGH (ref 40–120)
ALT FLD-CCNC: 18 U/L — SIGNIFICANT CHANGE UP (ref 10–45)
ANION GAP SERPL CALC-SCNC: 10 MMOL/L — SIGNIFICANT CHANGE UP (ref 5–17)
AST SERPL-CCNC: 43 U/L — HIGH (ref 10–40)
BILIRUB SERPL-MCNC: 0.3 MG/DL — SIGNIFICANT CHANGE UP (ref 0.2–1.2)
BUN SERPL-MCNC: 13 MG/DL — SIGNIFICANT CHANGE UP (ref 7–23)
CALCIUM SERPL-MCNC: 9.5 MG/DL — SIGNIFICANT CHANGE UP (ref 8.4–10.5)
CHLORIDE SERPL-SCNC: 96 MMOL/L — SIGNIFICANT CHANGE UP (ref 96–108)
CO2 SERPL-SCNC: 27 MMOL/L — SIGNIFICANT CHANGE UP (ref 22–31)
CREAT SERPL-MCNC: 0.62 MG/DL — SIGNIFICANT CHANGE UP (ref 0.5–1.3)
EGFR: 102 ML/MIN/1.73M2 — SIGNIFICANT CHANGE UP
GLUCOSE SERPL-MCNC: 88 MG/DL — SIGNIFICANT CHANGE UP (ref 70–99)
HCT VFR BLD CALC: 32.9 % — LOW (ref 34.5–45)
HGB BLD-MCNC: 10.7 G/DL — LOW (ref 11.5–15.5)
MAGNESIUM SERPL-MCNC: 2 MG/DL — SIGNIFICANT CHANGE UP (ref 1.6–2.6)
MCHC RBC-ENTMCNC: 31.8 PG — SIGNIFICANT CHANGE UP (ref 27–34)
MCHC RBC-ENTMCNC: 32.5 G/DL — SIGNIFICANT CHANGE UP (ref 32–36)
MCV RBC AUTO: 97.6 FL — SIGNIFICANT CHANGE UP (ref 80–100)
NRBC # BLD: 0 /100 WBCS — SIGNIFICANT CHANGE UP (ref 0–0)
PLATELET # BLD AUTO: 585 K/UL — HIGH (ref 150–400)
POTASSIUM SERPL-MCNC: 4.4 MMOL/L — SIGNIFICANT CHANGE UP (ref 3.5–5.3)
POTASSIUM SERPL-SCNC: 4.4 MMOL/L — SIGNIFICANT CHANGE UP (ref 3.5–5.3)
PROT SERPL-MCNC: 7.7 G/DL — SIGNIFICANT CHANGE UP (ref 6–8.3)
RBC # BLD: 3.37 M/UL — LOW (ref 3.8–5.2)
RBC # FLD: 14.6 % — HIGH (ref 10.3–14.5)
SODIUM SERPL-SCNC: 133 MMOL/L — LOW (ref 135–145)
WBC # BLD: 7.59 K/UL — SIGNIFICANT CHANGE UP (ref 3.8–10.5)
WBC # FLD AUTO: 7.59 K/UL — SIGNIFICANT CHANGE UP (ref 3.8–10.5)

## 2025-01-05 PROCEDURE — 99232 SBSQ HOSP IP/OBS MODERATE 35: CPT | Mod: 57

## 2025-01-05 RX ADMIN — ACETAMINOPHEN 650 MILLIGRAM(S): 80 SOLUTION/ DROPS ORAL at 21:39

## 2025-01-05 RX ADMIN — LIDOCAINE 1 PATCH: 50 OINTMENT TOPICAL at 23:52

## 2025-01-05 RX ADMIN — ACETAMINOPHEN 650 MILLIGRAM(S): 80 SOLUTION/ DROPS ORAL at 11:07

## 2025-01-05 RX ADMIN — SODIUM CHLORIDE 3 MILLILITER(S): 9 INJECTION, SOLUTION INTRAMUSCULAR; INTRAVENOUS; SUBCUTANEOUS at 13:26

## 2025-01-05 RX ADMIN — ATORVASTATIN CALCIUM 40 MILLIGRAM(S): 40 TABLET, FILM COATED ORAL at 21:40

## 2025-01-05 RX ADMIN — LIDOCAINE 1 PATCH: 50 OINTMENT TOPICAL at 11:08

## 2025-01-05 RX ADMIN — CHLORHEXIDINE GLUCONATE 1 APPLICATION(S): 1.2 RINSE ORAL at 05:30

## 2025-01-05 RX ADMIN — SODIUM CHLORIDE 3 MILLILITER(S): 9 INJECTION, SOLUTION INTRAMUSCULAR; INTRAVENOUS; SUBCUTANEOUS at 22:25

## 2025-01-05 RX ADMIN — ACETAMINOPHEN 650 MILLIGRAM(S): 80 SOLUTION/ DROPS ORAL at 12:00

## 2025-01-05 RX ADMIN — LATANOPROST 1 DROP(S): 50 SOLUTION OPHTHALMIC at 22:25

## 2025-01-05 RX ADMIN — SODIUM CHLORIDE 3 MILLILITER(S): 9 INJECTION, SOLUTION INTRAMUSCULAR; INTRAVENOUS; SUBCUTANEOUS at 05:30

## 2025-01-05 RX ADMIN — ACETAMINOPHEN 650 MILLIGRAM(S): 80 SOLUTION/ DROPS ORAL at 22:30

## 2025-01-05 RX ADMIN — Medication 17 GRAM(S): at 11:07

## 2025-01-05 RX ADMIN — Medication 5 MILLIGRAM(S): at 21:40

## 2025-01-05 RX ADMIN — Medication 81 MILLIGRAM(S): at 11:07

## 2025-01-05 RX ADMIN — SENNOSIDES 2 TABLET(S): 8.6 TABLET, FILM COATED ORAL at 21:40

## 2025-01-05 RX ADMIN — SACUBITRIL AND VALSARTAN 1 TABLET(S): 24; 26 TABLET, FILM COATED ORAL at 11:07

## 2025-01-05 RX ADMIN — SACUBITRIL AND VALSARTAN 1 TABLET(S): 24; 26 TABLET, FILM COATED ORAL at 21:40

## 2025-01-05 RX ADMIN — LIDOCAINE 1 PATCH: 50 OINTMENT TOPICAL at 19:51

## 2025-01-05 RX ADMIN — LIDOCAINE 1 PATCH: 50 OINTMENT TOPICAL at 00:00

## 2025-01-05 RX ADMIN — PANTOPRAZOLE 40 MILLIGRAM(S): 40 TABLET, DELAYED RELEASE ORAL at 06:51

## 2025-01-05 NOTE — PROGRESS NOTE ADULT - SUBJECTIVE AND OBJECTIVE BOX
Patient discussed on morning rounds with Dr. Calvin    OPERATION & DATE: 12/23/24 - AVR, ascending aorta replacement, cryomaze, JIM closure (35mm device), EF normal     SUBJECTIVE ASSESSMENT: Lola : ____   Pt is feeling okay this morning, no complaints other than some mild dizziness. Otherwise progressing well and Denies any chest pain, palpitations, orthopnea, dyspnea on exertion, shortness of breath, wheezing, abd pain, nausea, vomiting, constipation, lightheadedness, headaches, fevers, or chills.    VITAL SIGNS:  Vital Signs Last 24 Hrs  T(C): 36.8 (05 Jan 2025 08:58), Max: 37.1 (04 Jan 2025 17:01)  T(F): 98.2 (05 Jan 2025 08:58), Max: 98.7 (04 Jan 2025 17:01)  HR: 60 (05 Jan 2025 05:15) (58 - 64)  BP: 123/59 (05 Jan 2025 05:15) (107/62 - 123/59)  BP(mean): 85 (05 Jan 2025 05:15) (77 - 85)  RR: 18 (05 Jan 2025 05:15) (16 - 18)  SpO2: 98% (05 Jan 2025 05:15) (95% - 98%)    Parameters below as of 05 Jan 2025 05:15  Patient On (Oxygen Delivery Method): room air      I&O's Detail    04 Jan 2025 07:01  -  05 Jan 2025 07:00  --------------------------------------------------------  IN:    Oral Fluid: 1290 mL  Total IN: 1290 mL    OUT:    Voided (mL): 3400 mL  Total OUT: 3400 mL    Total NET: -2110 mL    CHEST TUBE:  no  MINDA DRAIN:   no  EPICARDIAL WIRES:  no  STITCHES: no  STAPLES: no  TRAN:  no   CENTRAL LINE: no  MIDLINE/PICC: no  WOUND VAC: no    PHYSICAL EXAM:  General: well appearing sitting in bed in NAD   HEENT: normocephalic  Cardio: s1/s2   Pulm: lungs cta   GI: +BS 4 quadrants   Extremities: no edema, no calf tenderness   Vascular: dp 2+ b/l   Incisions: sternotomy incision without erythema, purulence erythema     LABS:                        10.7   7.59  )-----------( 585      ( 05 Jan 2025 05:30 )             32.9       01-05    133[L]  |  96  |  13  ----------------------------<  88  4.4   |  27  |  0.62    Ca    9.5      05 Jan 2025 05:30  Mg     2.0     01-05    TPro  7.7  /  Alb  4.0  /  TBili  0.3  /  DBili  x   /  AST  43[H]  /  ALT  18  /  AlkPhos  190[H]  01-05    Urinalysis Basic - ( 05 Jan 2025 05:30 )    Color: x / Appearance: x / SG: x / pH: x  Gluc: 88 mg/dL / Ketone: x  / Bili: x / Urobili: x   Blood: x / Protein: x / Nitrite: x   Leuk Esterase: x / RBC: x / WBC x   Sq Epi: x / Non Sq Epi: x / Bacteria: x      MEDICATIONS  (STANDING):  aspirin enteric coated 81 milliGRAM(s) Oral daily  atorvastatin 40 milliGRAM(s) Oral at bedtime  bisacodyl 5 milliGRAM(s) Oral at bedtime  chlorhexidine 2% Cloths 1 Application(s) Topical daily  glucagon  Injectable 1 milliGRAM(s) IntraMuscular once  latanoprost 0.005% Ophthalmic Solution 1 Drop(s) Both EYES at bedtime  lidocaine   4% Patch 1 Patch Transdermal every 24 hours  pantoprazole    Tablet 40 milliGRAM(s) Oral before breakfast  polyethylene glycol 3350 17 Gram(s) Oral daily  sacubitril 49 mG/valsartan 51 mG 1 Tablet(s) Oral every 12 hours  senna 2 Tablet(s) Oral at bedtime  sodium chloride 0.9% lock flush 3 milliLiter(s) IV Push every 8 hours  sodium chloride 0.9%. 1000 milliLiter(s) (10 mL/Hr) IV Continuous <Continuous>    MEDICATIONS  (PRN):  acetaminophen     Tablet .. 650 milliGRAM(s) Oral every 6 hours PRN Moderate Pain (4 - 6)  melatonin 5 milliGRAM(s) Oral at bedtime PRN Sleep  oxyCODONE    IR 5 milliGRAM(s) Oral every 6 hours PRN Severe Pain (7 - 10)    RADIOLOGY & ADDITIONAL TESTS:  no new relevant imaging    Patient discussed on morning rounds with Dr. Calvin    OPERATION & DATE: 12/23/24 - AVR, ascending aorta replacement, cryomaze, JIM closure (35mm device), EF normal     SUBJECTIVE ASSESSMENT: Lola : [id number] Shayan   Pt is feeling okay this morning, no complaints other than some mild dizziness. Otherwise progressing well and Denies any chest pain, palpitations, orthopnea, dyspnea on exertion, shortness of breath, wheezing, abd pain, nausea, vomiting, constipation, lightheadedness, headaches, fevers, or chills.    VITAL SIGNS:  Vital Signs Last 24 Hrs  T(C): 36.8 (05 Jan 2025 08:58), Max: 37.1 (04 Jan 2025 17:01)  T(F): 98.2 (05 Jan 2025 08:58), Max: 98.7 (04 Jan 2025 17:01)  HR: 60 (05 Jan 2025 05:15) (58 - 64)  BP: 123/59 (05 Jan 2025 05:15) (107/62 - 123/59)  BP(mean): 85 (05 Jan 2025 05:15) (77 - 85)  RR: 18 (05 Jan 2025 05:15) (16 - 18)  SpO2: 98% (05 Jan 2025 05:15) (95% - 98%)    Parameters below as of 05 Jan 2025 05:15  Patient On (Oxygen Delivery Method): room air      I&O's Detail    04 Jan 2025 07:01  -  05 Jan 2025 07:00  --------------------------------------------------------  IN:    Oral Fluid: 1290 mL  Total IN: 1290 mL    OUT:    Voided (mL): 3400 mL  Total OUT: 3400 mL    Total NET: -2110 mL    CHEST TUBE:  no  MINDA DRAIN:   no  EPICARDIAL WIRES:  no  STITCHES: no  STAPLES: no  TRAN:  no   CENTRAL LINE: no  MIDLINE/PICC: no  WOUND VAC: no    PHYSICAL EXAM:  General: well appearing sitting in bed in NAD   HEENT: normocephalic  Cardio: s1/s2   Pulm: lungs cta   GI: +BS 4 quadrants   Extremities: no edema, no calf tenderness   Vascular: dp 2+ b/l   Incisions: sternotomy incision without erythema, purulence erythema     LABS:                        10.7   7.59  )-----------( 585      ( 05 Jan 2025 05:30 )             32.9       01-05    133[L]  |  96  |  13  ----------------------------<  88  4.4   |  27  |  0.62    Ca    9.5      05 Jan 2025 05:30  Mg     2.0     01-05    TPro  7.7  /  Alb  4.0  /  TBili  0.3  /  DBili  x   /  AST  43[H]  /  ALT  18  /  AlkPhos  190[H]  01-05    Urinalysis Basic - ( 05 Jan 2025 05:30 )    Color: x / Appearance: x / SG: x / pH: x  Gluc: 88 mg/dL / Ketone: x  / Bili: x / Urobili: x   Blood: x / Protein: x / Nitrite: x   Leuk Esterase: x / RBC: x / WBC x   Sq Epi: x / Non Sq Epi: x / Bacteria: x      MEDICATIONS  (STANDING):  aspirin enteric coated 81 milliGRAM(s) Oral daily  atorvastatin 40 milliGRAM(s) Oral at bedtime  bisacodyl 5 milliGRAM(s) Oral at bedtime  chlorhexidine 2% Cloths 1 Application(s) Topical daily  glucagon  Injectable 1 milliGRAM(s) IntraMuscular once  latanoprost 0.005% Ophthalmic Solution 1 Drop(s) Both EYES at bedtime  lidocaine   4% Patch 1 Patch Transdermal every 24 hours  pantoprazole    Tablet 40 milliGRAM(s) Oral before breakfast  polyethylene glycol 3350 17 Gram(s) Oral daily  sacubitril 49 mG/valsartan 51 mG 1 Tablet(s) Oral every 12 hours  senna 2 Tablet(s) Oral at bedtime  sodium chloride 0.9% lock flush 3 milliLiter(s) IV Push every 8 hours  sodium chloride 0.9%. 1000 milliLiter(s) (10 mL/Hr) IV Continuous <Continuous>    MEDICATIONS  (PRN):  acetaminophen     Tablet .. 650 milliGRAM(s) Oral every 6 hours PRN Moderate Pain (4 - 6)  melatonin 5 milliGRAM(s) Oral at bedtime PRN Sleep  oxyCODONE    IR 5 milliGRAM(s) Oral every 6 hours PRN Severe Pain (7 - 10)    RADIOLOGY & ADDITIONAL TESTS:  no new relevant imaging

## 2025-01-05 NOTE — PROGRESS NOTE ADULT - ASSESSMENT
57 y/o Yi-speaking F with PMHx of HTN, HLD, Anemia, NSTEMI 2011 (no PCI), ?TIA in  (no deficits), pAfib (on Eliquis), HFpEF (EF 55-60%), gallstones who presented to [facility] for pre-op optimization. On 24 patient underwent sternotomy for AVR and ascending aorta replacement, cryo maze and JIM appendage closure (35mm). POD1 Stroke code called for lethargy, L sided weakness w/u negative. EEG for arm jerking- negative. On  and primacor. POD2 Extubated, HFNC for positive pressure.  weaned. POD3 Remained on low dose  and primacor. POD4  off, swan removed. Meds x 2 removed. weaned to NC. POD5 diuresed. Primacor weaned. POD6 diuresed. BB started. Conversion pause so pacing wires kept in. Pigtail placed for R pleural effusion. POD7 pigtail and wires removed. POCUS revealed small L pleural effusion with poor window. Transferred to [facility]. POD 8 Increased entresto to 49/51. Post-op TTE done with normal function of bioprosthetic valve. Statin re-started. Per PT/OT, patient should go to rehab. Starting authorization. POD 9 restarted 1/2 dose eliquis per Dr. Arizmendi/Doreen given extensive aortic surgery, TTE limited for effusion ordered for tomorrow after initiation. Clarify need for rehab vs home with PT tomorrow, rehab process has not been started.     Plan:  Neurovascular:   -Pain regimen: lidocaine patch     -PRN's: acetaminophen, oxycodone   -Insomnia: melatonin    HEENT:  -home eye drops: latanoprost b/l    Cardiovascular:   -POD14 AVR, ascending, Cryomaze, JIM closure, EF normal     -continue with ASA    -drains and wires all removed   -HTN:     -Entresto   -HLD: statin   -Conversion Pause:    -6sec pause yesterday    -EP consulted, plan is for PPM monday     -continue holding all neha blocking agents     -ghulam 50-60s   -HFpEF     -continue with Coreg, Entresto  -Afib: holding eliquis 2.5mg dosing in preparation for PPM insertion    -coreg held for now given 6sec pause overnight   -Hemodynamically stable.   -Monitor: BP, HR, tele    Respiratory:   -Oxygenating well on room air   -Encourage continued use of IS 10x/hr and frequent ambulation  -CXR: small left effusion, stable     GI:  -elevated alk phos: improved   -GI PPX: pantoprazole 40mg daily   -PO Diet: DASH/TLC   -Bowel Regimen: senna, miralax     Renal / :  -Diuresis plan: on hold, overdiuresis last few days   -BUN/Cr 13/0.62  -Monitor I/O's daily     Endocrine:    -A1c: 6.2   -TSH: 3.4     Hematologic:  -CBC: H/H- 10.7/32     ID:  -Temperature: afebrile   -CBC: WBC- 7.5     Prophylaxis:  -DVT prophylaxis on hold, was on eliquis, last dose 2.5mg last night, holding in anticipation for PPM insertion - restart post procedure   -Continue with SCD's b/l while patient is at rest     Disposition:  -preop for PPM tomorrow with EP      57 y/o Uzbek-speaking F with PMHx of HTN, HLD, Anemia, NSTEMI 2011 (no PCI), ?TIA in  (no deficits), pAfib (on Eliquis), HFpEF (EF 55-60%), gallstones who presented to [facility] for pre-op optimization. On 24 patient underwent sternotomy for AVR and ascending aorta replacement, cryo maze and JIM appendage closure (35mm). POD1 Stroke code called for lethargy, L sided weakness w/u negative. EEG for arm jerking- negative. On  and primacor. POD2 Extubated, HFNC for positive pressure.  weaned. POD3 Remained on low dose  and primacor. POD4  off, swan removed. Meds x 2 removed. weaned to NC. POD5 diuresed. Primacor weaned. POD6 diuresed. BB started. Conversion pause so pacing wires kept in. Pigtail placed for R pleural effusion. POD7 pigtail and wires removed. POCUS revealed small L pleural effusion with poor window. Transferred to [facility]. POD 8 Increased entresto to 49/51. Post-op TTE done with normal function of bioprosthetic valve. Statin re-started. Per PT/OT, patient should go to rehab. Starting authorization. POD 9 restarted 1/2 dose eliquis per Dr. Arizmendi/Doreen given extensive aortic surgery. Pt was pending rehab last few days and early on POD13 pt with conversion pauze, 6 seconds. EP consulted, planning for PPM. POD14 eliquis held today in anticipation for PPM Monday, pt stable.     Plan:  Neurovascular:   -Pain regimen: lidocaine patch     -PRN's: acetaminophen, oxycodone   -Insomnia: melatonin    HEENT:  -home eye drops: latanoprost b/l    Cardiovascular:   -POD14 AVR, ascending, Cryomaze, JIM closure, EF normal     -continue with ASA    -drains and wires all removed   -HTN:     -Entresto   -HLD: statin   -Conversion Pause:    -6sec pause yesterday    -EP consulted, plan is for PPM monday     -continue holding all neha blocking agents     -ghulam 50-60s   -HFpEF     -continue with Coreg, Entresto  -Afib: holding eliquis 2.5mg dosing in preparation for PPM insertion    -coreg held for now given 6sec pause overnight   -Hemodynamically stable.   -Monitor: BP, HR, tele    Respiratory:   -Oxygenating well on room air   -Encourage continued use of IS 10x/hr and frequent ambulation  -CXR: small left effusion, stable     GI:  -elevated alk phos: improved   -GI PPX: pantoprazole 40mg daily   -PO Diet: DASH/TLC   -Bowel Regimen: senna, miralax     Renal / :  -Diuresis plan: on hold, overdiuresis last few days   -BUN/Cr 13/0.62  -Monitor I/O's daily     Endocrine:    -A1c: 6.2   -TSH: 3.4     Hematologic:  -CBC: H/H- 10.7/32     ID:  -Temperature: afebrile   -CBC: WBC- 7.5     Prophylaxis:  -DVT prophylaxis on hold, was on eliquis, last dose 2.5mg last night, holding in anticipation for PPM insertion - restart post procedure   -Continue with SCD's b/l while patient is at rest     Disposition:  -preop for PPM tomorrow with EP      57 y/o Greek-speaking F with PMHx of HTN, HLD, Anemia, NSTEMI 2011 (no PCI), ?TIA in  (no deficits), pAfib (on Eliquis), HFpEF (EF 55-60%), gallstones who presented to [facility] for pre-op optimization. On 24 patient underwent sternotomy for AVR and ascending aorta replacement, cryo maze and JIM appendage closure (35mm). POD1 Stroke code called for lethargy, L sided weakness w/u negative. EEG for arm jerking- negative. On  and primacor. POD2 Extubated, HFNC for positive pressure.  weaned. POD3 Remained on low dose  and primacor. POD4  off, swan removed. Meds x 2 removed. weaned to NC. POD5 diuresed. Primacor weaned. POD6 diuresed. BB started. Conversion pause so pacing wires kept in. Pigtail placed for R pleural effusion. POD7 pigtail and wires removed. POCUS revealed small L pleural effusion with poor window. Transferred to [facility]. POD 8 Increased entresto to 49/51. Post-op TTE done with normal function of bioprosthetic valve. Statin re-started. Per PT/OT, patient should go to rehab. Starting authorization. POD 9 restarted 1/2 dose eliquis per Dr. Arizmendi/Doreen given extensive aortic surgery. Pt was pending rehab last few days and early on POD13 pt with conversion pauze, 6 seconds. EP consulted, planning for PPM. POD14 Eliquis held today in anticipation for PPM Monday, pt stable.    Plan:  Neurovascular:   -Pain regimen: lidocaine patch     -PRN's: acetaminophen, oxycodone   -Insomnia: melatonin    HEENT:  -home eye drops: latanoprost b/l    Cardiovascular:   -POD15 AVR, ascending, Cryomaze, JIM closure, EF normal     -continue with ASA    -drains and wires all removed   -HTN:     -Entresto   -HLD: statin   -Conversion Pause:    -6sec pause yesterday    -EP consulted, plan is for PPM monday     -continue holding all neha blocking agents     -ghulam 50-60s   -HFpEF     -continue with Coreg, Entresto  -Afib: holding eliquis 2.5mg dosing in preparation for PPM insertion    -coreg held for now given 6sec pause overnight   -Hemodynamically stable.   -Monitor: BP, HR, tele    Respiratory:   -Oxygenating well on room air   -Encourage continued use of IS 10x/hr and frequent ambulation  -CXR: small left effusion, stable     GI:  -elevated alk phos: improved   -GI PPX: pantoprazole 40mg daily   -PO Diet: DASH/TLC   -Bowel Regimen: senna, miralax     Renal / :  -Diuresis plan: on hold, overdiuresis last few days   -BUN/Cr 13/0.62  -Monitor I/O's daily     Endocrine:    -A1c: 6.2   -TSH: 3.4     Hematologic:  -CBC: H/H- 10.7/32     ID:  -Temperature: afebrile   -CBC: WBC- 7.5     Prophylaxis:  -DVT prophylaxis on hold, was on eliquis, last dose 2.5mg last night, holding in anticipation for PPM insertion - restart post procedure   -Continue with SCD's b/l while patient is at rest     Disposition:  -preop for PPM tomorrow with EP

## 2025-01-05 NOTE — PROGRESS NOTE ADULT - ASSESSMENT
Impression  1. Tachy-ghulam syndrome with symptomatic bradycardia and six second post termination pause as well as a secondary pause on Friday night.  2. Status post BioAVR and aortic root replacement for severe AR  3. History of AF status post MAZE/Atriclip   4. HTN  5. HFpEF    Plan  PPM tomorrow- keep NPO after midnight.  Hold eliquis dose tonight.

## 2025-01-05 NOTE — PROGRESS NOTE ADULT - SUBJECTIVE AND OBJECTIVE BOX
EPS Progress Note    S: Patient feeling well    O: T(C): 36.8 (01-05-25 @ 08:58), Max: 37.1 (01-04-25 @ 17:01)  HR: 66 (01-05-25 @ 09:00) (58 - 66)  BP: 121/63 (01-05-25 @ 09:00) (107/62 - 123/59)  RR: 16 (01-05-25 @ 09:00) (16 - 18)  SpO2: 98% (01-05-25 @ 09:00) (95% - 98%)  Wt(kg): --    TELE: sinus bradycardia no pauses overnight    PHYSICAL  General:  NAD        Chest:  CTA B/L, no w/r/r  Cardiac:  RRR, + s1/s2 , no m/g/r  Abdomen:   soft ND/NT  Extremities: No edema, b/l groin no hematoma/bleeding/oozing  Skin: midline sternotomy noted  Psych: A&Ox3, normal affect and mood  Neuro: no deficit noted     LABS:                        10.7   7.59  )-----------( 585      ( 05 Jan 2025 05:30 )             32.9     01-05    133[L]  |  96  |  13  ----------------------------<  88  4.4   |  27  |  0.62    Ca    9.5      05 Jan 2025 05:30  Mg     2.0     01-05    TPro  7.7  /  Alb  4.0  /  TBili  0.3  /  DBili  x   /  AST  43[H]  /  ALT  18  /  AlkPhos  190[H]  01-05          MEDICATIONS:  acetaminophen     Tablet .. 650 milliGRAM(s) Oral every 6 hours PRN  aspirin enteric coated 81 milliGRAM(s) Oral daily  atorvastatin 40 milliGRAM(s) Oral at bedtime  bisacodyl 5 milliGRAM(s) Oral at bedtime  chlorhexidine 2% Cloths 1 Application(s) Topical daily  glucagon  Injectable 1 milliGRAM(s) IntraMuscular once  latanoprost 0.005% Ophthalmic Solution 1 Drop(s) Both EYES at bedtime  lidocaine   4% Patch 1 Patch Transdermal every 24 hours  melatonin 5 milliGRAM(s) Oral at bedtime PRN  oxyCODONE    IR 5 milliGRAM(s) Oral every 6 hours PRN  pantoprazole    Tablet 40 milliGRAM(s) Oral before breakfast  polyethylene glycol 3350 17 Gram(s) Oral daily  sacubitril 49 mG/valsartan 51 mG 1 Tablet(s) Oral every 12 hours  senna 2 Tablet(s) Oral at bedtime  sodium chloride 0.9% lock flush 3 milliLiter(s) IV Push every 8 hours  sodium chloride 0.9%. 1000 milliLiter(s) IV Continuous <Continuous>      ASSESSMENT/PLAN

## 2025-01-06 LAB
ALBUMIN SERPL ELPH-MCNC: 3.5 G/DL — SIGNIFICANT CHANGE UP (ref 3.3–5)
ALP SERPL-CCNC: 154 U/L — HIGH (ref 40–120)
ALT FLD-CCNC: 16 U/L — SIGNIFICANT CHANGE UP (ref 10–45)
ANION GAP SERPL CALC-SCNC: 9 MMOL/L — SIGNIFICANT CHANGE UP (ref 5–17)
APTT BLD: 35.8 SEC — HIGH (ref 24.5–35.6)
AST SERPL-CCNC: 39 U/L — SIGNIFICANT CHANGE UP (ref 10–40)
BILIRUB SERPL-MCNC: 0.3 MG/DL — SIGNIFICANT CHANGE UP (ref 0.2–1.2)
BLD GP AB SCN SERPL QL: NEGATIVE — SIGNIFICANT CHANGE UP
BUN SERPL-MCNC: 12 MG/DL — SIGNIFICANT CHANGE UP (ref 7–23)
CALCIUM SERPL-MCNC: 9 MG/DL — SIGNIFICANT CHANGE UP (ref 8.4–10.5)
CHLORIDE SERPL-SCNC: 98 MMOL/L — SIGNIFICANT CHANGE UP (ref 96–108)
CO2 SERPL-SCNC: 27 MMOL/L — SIGNIFICANT CHANGE UP (ref 22–31)
CREAT SERPL-MCNC: 0.65 MG/DL — SIGNIFICANT CHANGE UP (ref 0.5–1.3)
EGFR: 101 ML/MIN/1.73M2 — SIGNIFICANT CHANGE UP
GLUCOSE SERPL-MCNC: 103 MG/DL — HIGH (ref 70–99)
INR BLD: 1.11 — SIGNIFICANT CHANGE UP (ref 0.85–1.16)
MAGNESIUM SERPL-MCNC: 1.7 MG/DL — SIGNIFICANT CHANGE UP (ref 1.6–2.6)
POTASSIUM SERPL-MCNC: 4.4 MMOL/L — SIGNIFICANT CHANGE UP (ref 3.5–5.3)
POTASSIUM SERPL-SCNC: 4.4 MMOL/L — SIGNIFICANT CHANGE UP (ref 3.5–5.3)
PROT SERPL-MCNC: 6.7 G/DL — SIGNIFICANT CHANGE UP (ref 6–8.3)
PROTHROM AB SERPL-ACNC: 13 SEC — SIGNIFICANT CHANGE UP (ref 9.9–13.4)
RH IG SCN BLD-IMP: POSITIVE — SIGNIFICANT CHANGE UP
SODIUM SERPL-SCNC: 134 MMOL/L — LOW (ref 135–145)

## 2025-01-06 PROCEDURE — 71045 X-RAY EXAM CHEST 1 VIEW: CPT | Mod: 26

## 2025-01-06 PROCEDURE — 33208 INSRT HEART PM ATRIAL & VENT: CPT | Mod: KX

## 2025-01-06 PROCEDURE — 93010 ELECTROCARDIOGRAM REPORT: CPT

## 2025-01-06 RX ORDER — CEFAZOLIN SODIUM 1 G
2000 VIAL (EA) INJECTION ONCE
Refills: 0 | Status: COMPLETED | OUTPATIENT
Start: 2025-01-06 | End: 2025-01-06

## 2025-01-06 RX ORDER — METOPROLOL TARTRATE 50 MG
12.5 TABLET ORAL EVERY 6 HOURS
Refills: 0 | Status: DISCONTINUED | OUTPATIENT
Start: 2025-01-06 | End: 2025-01-07

## 2025-01-06 RX ADMIN — CHLORHEXIDINE GLUCONATE 1 APPLICATION(S): 1.2 RINSE ORAL at 06:29

## 2025-01-06 RX ADMIN — Medication 5 MILLIGRAM(S): at 21:30

## 2025-01-06 RX ADMIN — LATANOPROST 1 DROP(S): 50 SOLUTION OPHTHALMIC at 21:41

## 2025-01-06 RX ADMIN — SENNOSIDES 2 TABLET(S): 8.6 TABLET, FILM COATED ORAL at 21:30

## 2025-01-06 RX ADMIN — Medication 800 MILLIGRAM(S): at 10:11

## 2025-01-06 RX ADMIN — SODIUM CHLORIDE 3 MILLILITER(S): 9 INJECTION, SOLUTION INTRAMUSCULAR; INTRAVENOUS; SUBCUTANEOUS at 21:41

## 2025-01-06 RX ADMIN — PANTOPRAZOLE 40 MILLIGRAM(S): 40 TABLET, DELAYED RELEASE ORAL at 06:10

## 2025-01-06 RX ADMIN — ATORVASTATIN CALCIUM 40 MILLIGRAM(S): 40 TABLET, FILM COATED ORAL at 21:45

## 2025-01-06 RX ADMIN — SACUBITRIL AND VALSARTAN 1 TABLET(S): 24; 26 TABLET, FILM COATED ORAL at 21:30

## 2025-01-06 RX ADMIN — SODIUM CHLORIDE 3 MILLILITER(S): 9 INJECTION, SOLUTION INTRAMUSCULAR; INTRAVENOUS; SUBCUTANEOUS at 06:28

## 2025-01-06 RX ADMIN — Medication 81 MILLIGRAM(S): at 11:31

## 2025-01-06 RX ADMIN — SODIUM CHLORIDE 3 MILLILITER(S): 9 INJECTION, SOLUTION INTRAMUSCULAR; INTRAVENOUS; SUBCUTANEOUS at 14:02

## 2025-01-06 NOTE — PRE-ANESTHESIA EVALUATION ADULT - NSANTHAIRWAYFT_ENT_ALL_CORE
Normal anatomy  small iid and tmd  normal rom
Good neck range of motion, good mouth opening, short thyromental distance.

## 2025-01-06 NOTE — PRE-ANESTHESIA EVALUATION ADULT - NSRADCARDRESULTSFT_GEN_ALL_CORE
All available imaging reviewed.    TTE 1/2/2025  "CONCLUSIONS:     1. Normal left ventricular size and systolic function.   2. Moderate symmetric left ventricular hypertrophy.   3. Normal right ventricular size and systolic function.   4. Bioprosthetic valve is seen in the aortic position.   5. No pericardial effusion.   6. Left pleural effusion.   7. Compared to the previous TTE performed on 12/24/2024, there have been no significant interval changes.   8. Consider infiltrative disease."

## 2025-01-06 NOTE — PRE-ANESTHESIA EVALUATION ADULT - NSDENTALSD_ENT_ALL_CORE
Missing several rear molars, premolars, teeth. Poor dentition with several chipped teeth, ground/worn teeth, cavities./loose teeth/missing teeth
appears normal and intact

## 2025-01-06 NOTE — PRE-ANESTHESIA EVALUATION ADULT - NSANTHPMHFT_GEN_ALL_CORE
58 year old French Speaking Female with PMH of HTN, HLD, Anemia, NSTEMI 2011 without PCI, Possible TIA 2011 without residual deficits, paroxysmal atrial fibrillation on eliquis, HFpEF (EF 55-60%), gallstones, severe aortic regurgitation s/p open sternotomy for aortic valve replacement and ascending aortic arch replacement, cryo maze, JIM appendage closure 12/23/2024 now scheduled for dual chamber pacemaker implantation.    Cardiac: Positive for HTN, HLD, NSTEMI 2011, Paroxysmal Atrial Fibrillation, Conversion Pause, HFpEF (EF 55-60). Recent open sternotomy for aortic valve replacement and ascending aortic arch replacement, cryo maze, JIM appendage closure 12/23/2024. >4 METS  Pulmonary: Denies Asthma, COPD, MAOR  Renal: Denies kidney dysfunction  Hepatic: Denies liver dysfunction  Gastrointestinal: Denies GERD/IBS  Endocrine: Denies DM or thyroid dysfunction.  Neurologic: Possible TIA without residual deficits 2011. Denies seizure disorder.  Hematologic: Positive for eliquis use, anemia, Hgb 10.7 on 6/5/2025. Denies blood clotting disorder, DVT/PE history.    PSH: Open sternotomy for aortic valve replacement and ascending aortic arch replacement, cryo maze, JIM appendage closure 12/23/2024

## 2025-01-06 NOTE — PRE-ANESTHESIA EVALUATION ADULT - NSANTHOSAYNRD_GEN_A_CORE
No. AMOR screening performed.  STOP BANG Legend: 0-2 = LOW Risk; 3-4 = INTERMEDIATE Risk; 5-8 = HIGH Risk
No. AMOR screening performed.  STOP BANG Legend: 0-2 = LOW Risk; 3-4 = INTERMEDIATE Risk; 5-8 = HIGH Risk

## 2025-01-06 NOTE — PROGRESS NOTE ADULT - ASSESSMENT
Neuro:   No delirium and focal deficits.   Pain:      Cardio:  POD 14 Aortic stenosis s/p AVR, ascending aorta replacement   - C/w ASA and coreg  POD 14 Afib s/p Cryomaze ablation and JIM closure    - Holding AC in setting of PM placement today, restart at 2.5 mg after procedure   HFpEF (EF 55-60%%)   - c/w Entresto and coreg  Sick Sinus Syndrome (tachy-ghulam syndrome + 6 sec conversion pause):   - PPM placement today with EP   HTN: c/w above regimen  HLD: c/w lipitor  Vitals stable over the last 24 hrs     Pulm:   IS encouraged. Sating at 99% on RA  CXR: last 1/3, ordered one for tomorrow     GI:   Tolerating diet well   Prophylaxis: 40 mg pantoprazole.   Bowel: Senna, dulcolax, and PEG.      ID:   WBC 7. Afebrile.  Monitor fever curve     Renal:   BUN/Creat @ 12/0.65  I/O net: -230  Electrolytes: Replete electrolytes prn.     Hem:   H&H @ 10/32  DVT prophylaxis: Holding Sub Q heparin, starting     Endo:   A1C:  TSH:    MSK:   Ambulating well. Continue with PT/OT. ***    Disposition:   Continue with inpatient care. *** 57 y/o Sinhala-speaking F with PMHx of HTN, HLD, Anemia, NSTEMI 2011 (no PCI), ?TIA in  (no deficits), pAfib (on Eliquis), HFpEF (EF 55-60%), gallstones who presented to [facility] for pre-op optimization. On 24 patient underwent sternotomy for AVR and ascending aorta replacement, cryo maze and JIM appendage closure (35mm). POD1 Stroke code called for lethargy, L sided weakness w/u negative. EEG for arm jerking- negative. On  and primacor. POD2 Extubated, HFNC for positive pressure.  weaned. POD3 Remained on low dose  and primacor. POD4  off, swan removed. Meds x 2 removed. weaned to NC. POD5 diuresed. Primacor weaned. POD6 diuresed. BB started. Conversion pause so pacing wires kept in. Pigtail placed for R pleural effusion. POD7 pigtail and wires removed. POCUS revealed small L pleural effusion with poor window. Transferred to [facility]. POD 8 Increased entresto to 49/51. Post-op TTE done with normal function of bioprosthetic valve. Statin re-started. Per PT/OT, patient should go to rehab. Starting authorization. POD 9 restarted 1/2 dose eliquis per Dr. Arizmendi/Doreen given extensive aortic surgery. Pt was pending rehab last few days and early on POD13 pt with conversion pauze, 6 seconds. EP consulted. POD14 PPM today, Eliquis held yesterday.     Neuro:   No delirium and focal deficits.   Pain: Tylenol and oxy prn      Cardio:  POD 14 Aortic stenosis s/p AVR, ascending aorta replacement   - C/w ASA and coreg  POD 14 Afib s/p Cryomaze ablation and JIM closure    - Holding AC in setting of PM placement today, restart at 2.5 mg after procedure   HFpEF (EF 55-60%%)   - c/w Entresto and coreg  Sick Sinus Syndrome (tachy-ghulam syndrome + 6 sec conversion pause):   - PPM placement today with EP   HTN: c/w above regimen  HLD: c/w lipitor  Vitals stable over the last 24 hrs    HEENT:   Home eye drops: latanoprost b/l     Pulm:   IS encouraged. Sating at 99% on RA  CXR: last 1/3, ordered one for tomorrow     GI:   Elevated ALP:   - Stable   Tolerating diet well   Prophylaxis: 40 mg pantoprazole.   Bowel: Senna, dulcolax, and PEG.      ID:   WBC 7. Afebrile.  Monitor fever curve     Renal:   BUN/Creat @ 12/0.65  I/O net: -230  Electrolytes: Replete electrolytes prn.     Hem:   H&H @ 10/32  DVT prophylaxis: Holding Sub Q heparin, starting     Endo:   A1C: 6.2  TSH: 3.4    MSK:   Ambulating well. Continue with PT/OT.     Disposition:   Continue with inpatient care. PPM today with EP. Awaiting rehab.

## 2025-01-06 NOTE — PROGRESS NOTE ADULT - SUBJECTIVE AND OBJECTIVE BOX
Patient discussed on morning rounds with Dr. Gray    OPERATION & DATE: 12/23/24 s/p AVR, ascending aorta replacement, cryomaze, JIM closure (35mm device), EF 55-60%    SUBJECTIVE ASSESSMENT:    Patient reports improvement of her pain and breathing. Pulling 1000 cc on IS, 3 days ago was pulling <750 cc. Denies chest pain, headaches, N/V, or SOB.     VITAL SIGNS:  Vital Signs Last 24 Hrs  T(C): 36.7 (06 Jan 2025 08:43), Max: 36.8 (05 Jan 2025 21:27)  T(F): 98.1 (06 Jan 2025 08:43), Max: 98.3 (05 Jan 2025 21:27)  HR: 74 (06 Jan 2025 08:49) (68 - 76)  BP: 121/68 (06 Jan 2025 08:49) (97/57 - 121/73)  BP(mean): 87 (06 Jan 2025 08:49) (69 - 91)  RR: 17 (06 Jan 2025 08:49) (16 - 18)  SpO2: 99% (06 Jan 2025 08:49) (96% - 100%)    Parameters below as of 06 Jan 2025 08:49  Patient On (Oxygen Delivery Method): room air      I&O's Detail    05 Jan 2025 07:01  -  06 Jan 2025 07:00  --------------------------------------------------------  IN:    Oral Fluid: 120 mL  Total IN: 120 mL    OUT:    Voided (mL): 350 mL  Total OUT: 350 mL    Total NET: -230 mL      PHYSICAL EXAM:  General: Sitting in bed comfortably in NAD  Neuro: A&Ox3, no focal deficits   HEENT: NCAT, EOMI   Cardiac: Regular rate and rhythm, normal S1 and S2. + murmur.   Pulm: Breathing comfortably on room air. No signs of respiratory distress. Lungs are CTA b/l without wheezes, rales, or rhonchi   Abdomen: Soft, non-distended, non-tender. + bowel sounds   Extremities: Warm and well perfused, no peripheral edema, distal pulses 2+. No calf tenderness. SCDs and TEDs in place  MSK: Full AROM   Wound: MSI is well-healing. Chest tube insertion sites are without drainage or erythema.     LABS:                        10.7   7.59  )-----------( 585      ( 05 Jan 2025 05:30 )             32.9     PT/INR - ( 06 Jan 2025 05:21 )   PT: 13.0 sec;   INR: 1.11          PTT - ( 06 Jan 2025 05:21 )  PTT:35.8 sec  01-06    134[L]  |  98  |  12  ----------------------------<  103[H]  4.4   |  27  |  0.65    Ca    9.0      06 Jan 2025 05:21  Mg     1.7     01-06    TPro  6.7  /  Alb  3.5  /  TBili  0.3  /  DBili  x   /  AST  39  /  ALT  16  /  AlkPhos  154[H]  01-06    Urinalysis Basic - ( 06 Jan 2025 05:21 )    Color: x / Appearance: x / SG: x / pH: x  Gluc: 103 mg/dL / Ketone: x  / Bili: x / Urobili: x   Blood: x / Protein: x / Nitrite: x   Leuk Esterase: x / RBC: x / WBC x   Sq Epi: x / Non Sq Epi: x / Bacteria: x      MEDICATIONS  (STANDING):  aspirin enteric coated 81 milliGRAM(s) Oral daily  atorvastatin 40 milliGRAM(s) Oral at bedtime  bisacodyl 5 milliGRAM(s) Oral at bedtime  chlorhexidine 2% Cloths 1 Application(s) Topical daily  glucagon  Injectable 1 milliGRAM(s) IntraMuscular once  latanoprost 0.005% Ophthalmic Solution 1 Drop(s) Both EYES at bedtime  lidocaine   4% Patch 1 Patch Transdermal every 24 hours  pantoprazole    Tablet 40 milliGRAM(s) Oral before breakfast  polyethylene glycol 3350 17 Gram(s) Oral daily  sacubitril 49 mG/valsartan 51 mG 1 Tablet(s) Oral every 12 hours  senna 2 Tablet(s) Oral at bedtime  sodium chloride 0.9% lock flush 3 milliLiter(s) IV Push every 8 hours  sodium chloride 0.9%. 1000 milliLiter(s) (10 mL/Hr) IV Continuous <Continuous>    MEDICATIONS  (PRN):  acetaminophen     Tablet .. 650 milliGRAM(s) Oral every 6 hours PRN Moderate Pain (4 - 6)  melatonin 5 milliGRAM(s) Oral at bedtime PRN Sleep  oxyCODONE    IR 5 milliGRAM(s) Oral every 6 hours PRN Severe Pain (7 - 10)    RADIOLOGY & ADDITIONAL TESTS:       Patient discussed on morning rounds with Dr. Gray    OPERATION & DATE: 12/23/24 s/p AVR, ascending aorta replacement, cryomaze, JIM closure (35mm device), EF 55-60%    SUBJECTIVE ASSESSMENT:  Spoke with patient and her english speaking son, who helped to translate he conversation.   Patient reports improvement of her pain and breathing. Pulling 1000 cc on IS, 3 days ago was pulling <750 cc. Denies chest pain, headaches, N/V, or SOB.     VITAL SIGNS:  Vital Signs Last 24 Hrs  T(C): 36.7 (06 Jan 2025 08:43), Max: 36.8 (05 Jan 2025 21:27)  T(F): 98.1 (06 Jan 2025 08:43), Max: 98.3 (05 Jan 2025 21:27)  HR: 74 (06 Jan 2025 08:49) (68 - 76)  BP: 121/68 (06 Jan 2025 08:49) (97/57 - 121/73)  BP(mean): 87 (06 Jan 2025 08:49) (69 - 91)  RR: 17 (06 Jan 2025 08:49) (16 - 18)  SpO2: 99% (06 Jan 2025 08:49) (96% - 100%)    Parameters below as of 06 Jan 2025 08:49  Patient On (Oxygen Delivery Method): room air      I&O's Detail    05 Jan 2025 07:01  -  06 Jan 2025 07:00  --------------------------------------------------------  IN:    Oral Fluid: 120 mL  Total IN: 120 mL    OUT:    Voided (mL): 350 mL  Total OUT: 350 mL    Total NET: -230 mL      PHYSICAL EXAM:  General: Sitting in bed comfortably in NAD  Neuro: A&Ox3, no focal deficits   HEENT: NCAT, EOMI   Cardiac: Regular rate and rhythm, normal S1 and S2. + murmur.   Pulm: Breathing comfortably on room air. No signs of respiratory distress. Lungs are CTA b/l without wheezes, rales, or rhonchi   Abdomen: Soft, non-distended, non-tender. + bowel sounds   Extremities: Warm and well perfused, no peripheral edema, distal pulses 2+. No calf tenderness. SCDs and TEDs in place  MSK: Full AROM   Wound: MSI is well-healing. Chest tube insertion sites are without drainage or erythema.     LABS:                        10.7   7.59  )-----------( 585      ( 05 Jan 2025 05:30 )             32.9     PT/INR - ( 06 Jan 2025 05:21 )   PT: 13.0 sec;   INR: 1.11          PTT - ( 06 Jan 2025 05:21 )  PTT:35.8 sec  01-06    134[L]  |  98  |  12  ----------------------------<  103[H]  4.4   |  27  |  0.65    Ca    9.0      06 Jan 2025 05:21  Mg     1.7     01-06    TPro  6.7  /  Alb  3.5  /  TBili  0.3  /  DBili  x   /  AST  39  /  ALT  16  /  AlkPhos  154[H]  01-06    Urinalysis Basic - ( 06 Jan 2025 05:21 )    Color: x / Appearance: x / SG: x / pH: x  Gluc: 103 mg/dL / Ketone: x  / Bili: x / Urobili: x   Blood: x / Protein: x / Nitrite: x   Leuk Esterase: x / RBC: x / WBC x   Sq Epi: x / Non Sq Epi: x / Bacteria: x      MEDICATIONS  (STANDING):  aspirin enteric coated 81 milliGRAM(s) Oral daily  atorvastatin 40 milliGRAM(s) Oral at bedtime  bisacodyl 5 milliGRAM(s) Oral at bedtime  chlorhexidine 2% Cloths 1 Application(s) Topical daily  glucagon  Injectable 1 milliGRAM(s) IntraMuscular once  latanoprost 0.005% Ophthalmic Solution 1 Drop(s) Both EYES at bedtime  lidocaine   4% Patch 1 Patch Transdermal every 24 hours  pantoprazole    Tablet 40 milliGRAM(s) Oral before breakfast  polyethylene glycol 3350 17 Gram(s) Oral daily  sacubitril 49 mG/valsartan 51 mG 1 Tablet(s) Oral every 12 hours  senna 2 Tablet(s) Oral at bedtime  sodium chloride 0.9% lock flush 3 milliLiter(s) IV Push every 8 hours  sodium chloride 0.9%. 1000 milliLiter(s) (10 mL/Hr) IV Continuous <Continuous>    MEDICATIONS  (PRN):  acetaminophen     Tablet .. 650 milliGRAM(s) Oral every 6 hours PRN Moderate Pain (4 - 6)  melatonin 5 milliGRAM(s) Oral at bedtime PRN Sleep  oxyCODONE    IR 5 milliGRAM(s) Oral every 6 hours PRN Severe Pain (7 - 10)    RADIOLOGY & ADDITIONAL TESTS:

## 2025-01-06 NOTE — PRE-OP CHECKLIST - SELECT TESTS ORDERED
BMP/CBC/CMP/PT/PTT/INR/Type and Screen/Urinalysis/EKG/CXR/POCT Blood Glucose
PT/PTT/INR/Hepatic Function/Spirometry/Type and Screen/UCG/EKG/CXR

## 2025-01-06 NOTE — PRE-ANESTHESIA EVALUATION ADULT - NSANTHPROCED_GEN_ALL_CORE
Clearsight non-invasive continuous arterial blood pressure monitoring./Arterial Catheter
Arterial Catheter/Central Venous Catheter/Pulmonary Artery Catheter/Transesophageal Echocardiogram

## 2025-01-06 NOTE — PRE-ANESTHESIA EVALUATION ADULT - NSANTHPEFT_GEN_ALL_CORE
ANO x 3, RRR, systolic murmur, no rubs/gallops, CTAB, MAEW.
General: Appearance is consistent with chronological age. No abnormal facies.  Airway:  See Mallampati score  EENT: Anicteric sclera; oropharynx clear, moist mucus membranes  Cardiovascular:  Regular rate and rhythm  Respiratory: Unlabored breathing  Neurological: Awake and alert, moves all extremities

## 2025-01-07 LAB
ANION GAP SERPL CALC-SCNC: 8 MMOL/L — SIGNIFICANT CHANGE UP (ref 5–17)
BUN SERPL-MCNC: 13 MG/DL — SIGNIFICANT CHANGE UP (ref 7–23)
CALCIUM SERPL-MCNC: 8.5 MG/DL — SIGNIFICANT CHANGE UP (ref 8.4–10.5)
CHLORIDE SERPL-SCNC: 98 MMOL/L — SIGNIFICANT CHANGE UP (ref 96–108)
CO2 SERPL-SCNC: 24 MMOL/L — SIGNIFICANT CHANGE UP (ref 22–31)
CREAT SERPL-MCNC: 0.52 MG/DL — SIGNIFICANT CHANGE UP (ref 0.5–1.3)
EGFR: 106 ML/MIN/1.73M2 — SIGNIFICANT CHANGE UP
GLUCOSE SERPL-MCNC: 125 MG/DL — HIGH (ref 70–99)
HCT VFR BLD CALC: 26.7 % — LOW (ref 34.5–45)
HGB BLD-MCNC: 8.5 G/DL — LOW (ref 11.5–15.5)
MAGNESIUM SERPL-MCNC: 1.7 MG/DL — SIGNIFICANT CHANGE UP (ref 1.6–2.6)
MCHC RBC-ENTMCNC: 30.4 PG — SIGNIFICANT CHANGE UP (ref 27–34)
MCHC RBC-ENTMCNC: 31.8 G/DL — LOW (ref 32–36)
MCV RBC AUTO: 95.4 FL — SIGNIFICANT CHANGE UP (ref 80–100)
NRBC # BLD: 0 /100 WBCS — SIGNIFICANT CHANGE UP (ref 0–0)
PLATELET # BLD AUTO: 534 K/UL — HIGH (ref 150–400)
POTASSIUM SERPL-MCNC: 4.3 MMOL/L — SIGNIFICANT CHANGE UP (ref 3.5–5.3)
POTASSIUM SERPL-SCNC: 4.3 MMOL/L — SIGNIFICANT CHANGE UP (ref 3.5–5.3)
RBC # BLD: 2.8 M/UL — LOW (ref 3.8–5.2)
RBC # FLD: 14.4 % — SIGNIFICANT CHANGE UP (ref 10.3–14.5)
SODIUM SERPL-SCNC: 130 MMOL/L — LOW (ref 135–145)
WBC # BLD: 6.57 K/UL — SIGNIFICANT CHANGE UP (ref 3.8–10.5)
WBC # FLD AUTO: 6.57 K/UL — SIGNIFICANT CHANGE UP (ref 3.8–10.5)

## 2025-01-07 PROCEDURE — 71045 X-RAY EXAM CHEST 1 VIEW: CPT | Mod: 26,59

## 2025-01-07 PROCEDURE — 99231 SBSQ HOSP IP/OBS SF/LOW 25: CPT

## 2025-01-07 PROCEDURE — 71046 X-RAY EXAM CHEST 2 VIEWS: CPT | Mod: 26

## 2025-01-07 RX ORDER — METOPROLOL TARTRATE 50 MG
12.5 TABLET ORAL ONCE
Refills: 0 | Status: COMPLETED | OUTPATIENT
Start: 2025-01-07 | End: 2025-01-07

## 2025-01-07 RX ORDER — METOPROLOL TARTRATE 50 MG
25 TABLET ORAL EVERY 12 HOURS
Refills: 0 | Status: DISCONTINUED | OUTPATIENT
Start: 2025-01-07 | End: 2025-01-08

## 2025-01-07 RX ORDER — APIXABAN 5 MG/1
2.5 TABLET, FILM COATED ORAL EVERY 12 HOURS
Refills: 0 | Status: DISCONTINUED | OUTPATIENT
Start: 2025-01-07 | End: 2025-01-09

## 2025-01-07 RX ADMIN — SODIUM CHLORIDE 3 MILLILITER(S): 9 INJECTION, SOLUTION INTRAMUSCULAR; INTRAVENOUS; SUBCUTANEOUS at 13:45

## 2025-01-07 RX ADMIN — LIDOCAINE 1 PATCH: 50 OINTMENT TOPICAL at 18:29

## 2025-01-07 RX ADMIN — ACETAMINOPHEN 650 MILLIGRAM(S): 80 SOLUTION/ DROPS ORAL at 05:07

## 2025-01-07 RX ADMIN — SACUBITRIL AND VALSARTAN 1 TABLET(S): 24; 26 TABLET, FILM COATED ORAL at 21:51

## 2025-01-07 RX ADMIN — Medication 800 MILLIGRAM(S): at 09:55

## 2025-01-07 RX ADMIN — Medication 12.5 MILLIGRAM(S): at 05:08

## 2025-01-07 RX ADMIN — APIXABAN 2.5 MILLIGRAM(S): 5 TABLET, FILM COATED ORAL at 17:57

## 2025-01-07 RX ADMIN — ACETAMINOPHEN 650 MILLIGRAM(S): 80 SOLUTION/ DROPS ORAL at 06:00

## 2025-01-07 RX ADMIN — LIDOCAINE 1 PATCH: 50 OINTMENT TOPICAL at 21:45

## 2025-01-07 RX ADMIN — ATORVASTATIN CALCIUM 40 MILLIGRAM(S): 40 TABLET, FILM COATED ORAL at 21:51

## 2025-01-07 RX ADMIN — ACETAMINOPHEN 650 MILLIGRAM(S): 80 SOLUTION/ DROPS ORAL at 13:42

## 2025-01-07 RX ADMIN — SODIUM CHLORIDE 3 MILLILITER(S): 9 INJECTION, SOLUTION INTRAMUSCULAR; INTRAVENOUS; SUBCUTANEOUS at 05:12

## 2025-01-07 RX ADMIN — ACETAMINOPHEN 650 MILLIGRAM(S): 80 SOLUTION/ DROPS ORAL at 22:22

## 2025-01-07 RX ADMIN — SODIUM CHLORIDE 3 MILLILITER(S): 9 INJECTION, SOLUTION INTRAMUSCULAR; INTRAVENOUS; SUBCUTANEOUS at 21:45

## 2025-01-07 RX ADMIN — Medication 81 MILLIGRAM(S): at 12:59

## 2025-01-07 RX ADMIN — ACETAMINOPHEN 650 MILLIGRAM(S): 80 SOLUTION/ DROPS ORAL at 22:52

## 2025-01-07 RX ADMIN — ACETAMINOPHEN 650 MILLIGRAM(S): 80 SOLUTION/ DROPS ORAL at 14:12

## 2025-01-07 RX ADMIN — LATANOPROST 1 DROP(S): 50 SOLUTION OPHTHALMIC at 21:52

## 2025-01-07 RX ADMIN — Medication 12.5 MILLIGRAM(S): at 00:28

## 2025-01-07 RX ADMIN — Medication 12.5 MILLIGRAM(S): at 12:59

## 2025-01-07 RX ADMIN — LIDOCAINE 1 PATCH: 50 OINTMENT TOPICAL at 09:56

## 2025-01-07 RX ADMIN — Medication 25 MILLIGRAM(S): at 17:57

## 2025-01-07 RX ADMIN — PANTOPRAZOLE 40 MILLIGRAM(S): 40 TABLET, DELAYED RELEASE ORAL at 05:10

## 2025-01-07 RX ADMIN — SACUBITRIL AND VALSARTAN 1 TABLET(S): 24; 26 TABLET, FILM COATED ORAL at 09:55

## 2025-01-07 RX ADMIN — CHLORHEXIDINE GLUCONATE 1 APPLICATION(S): 1.2 RINSE ORAL at 05:12

## 2025-01-07 RX ADMIN — Medication 17 GRAM(S): at 12:59

## 2025-01-07 NOTE — PROGRESS NOTE ADULT - SUBJECTIVE AND OBJECTIVE BOX
Patient discussed on morning rounds with       OPERATION & DATE: 12/23/24 AVR, ascending aorta replacement, cryomaze, JIM closure (35 mm device), EF 55-60%    SUBJECTIVE ASSESSMENT:  Assessed at bedside. No acute complaints. Denies SOB, chest pain, fever, chills, nausea, vomiting.     VITAL SIGNS:  Vital Signs Last 24 Hrs  T(C): 36.6 (07 Jan 2025 09:07), Max: 36.7 (06 Jan 2025 23:59)  T(F): 97.9 (07 Jan 2025 09:07), Max: 98 (06 Jan 2025 23:59)  HR: 78 (07 Jan 2025 11:05) (78 - 116)  BP: 132/85 (07 Jan 2025 11:05) (90/55 - 132/85)  BP(mean): 105 (07 Jan 2025 11:05) (67 - 105)  RR: 18 (07 Jan 2025 11:05) (16 - 20)  SpO2: 98% (07 Jan 2025 11:05) (93% - 99%)    Parameters below as of 07 Jan 2025 11:05  Patient On (Oxygen Delivery Method): room air      I&O's Detail    06 Jan 2025 07:01  -  07 Jan 2025 07:00  --------------------------------------------------------  IN:    Oral Fluid: 360 mL  Total IN: 360 mL    OUT:    Voided (mL): 300 mL  Total OUT: 300 mL    Total NET: 60 mL      07 Jan 2025 07:01  -  07 Jan 2025 12:56  --------------------------------------------------------  IN:    Oral Fluid: 500 mL  Total IN: 500 mL    OUT:  Total OUT: 0 mL    Total NET: 500 mL    CHEST TUBE:  no  MINDA DRAIN:  no  EPICARDIAL WIRES: no   STITCHES: no  STAPLES: no  TRAN:  no  CENTRAL LINE: no  MIDLINE/PICC: no  WOUND VAC: no    Physical Exam  CONSTITUTIONAL: Well appearing in NAD assessed laying comfortably in bed   NEURO: A&OX3. No focal deficits noted, moving bilateral upper and lower extremities                    CV: RRR, no murmurs, rubs, gallops  RESPIRATORY: Clear to auscultation bilateral posterior lung fields, no wheezes, rales, rhonchi   GI: +BS, NT/ND  MUSKULOSKELETAL: No peripheral edema or calf tenderness. Full strength and ROM bilateral upper and lower extremities   VASCULAR: Bilateral distal pulses 2+  INCISIONS: MSI clean, dry, intact, no sternal click     LABS:                        8.5    6.57  )-----------( 534      ( 07 Jan 2025 05:30 )             26.7     PT/INR - ( 06 Jan 2025 05:21 )   PT: 13.0 sec;   INR: 1.11          PTT - ( 06 Jan 2025 05:21 )  PTT:35.8 sec  01-07    130[L]  |  98  |  13  ----------------------------<  125[H]  4.3   |  24  |  0.52    Ca    8.5      07 Jan 2025 05:30  Mg     1.7     01-07    TPro  6.7  /  Alb  3.5  /  TBili  0.3  /  DBili  x   /  AST  39  /  ALT  16  /  AlkPhos  154[H]  01-06    Urinalysis Basic - ( 07 Jan 2025 05:30 )    Color: x / Appearance: x / SG: x / pH: x  Gluc: 125 mg/dL / Ketone: x  / Bili: x / Urobili: x   Blood: x / Protein: x / Nitrite: x   Leuk Esterase: x / RBC: x / WBC x   Sq Epi: x / Non Sq Epi: x / Bacteria: x      MEDICATIONS  (STANDING):  apixaban 2.5 milliGRAM(s) Oral every 12 hours  aspirin enteric coated 81 milliGRAM(s) Oral daily  atorvastatin 40 milliGRAM(s) Oral at bedtime  bisacodyl 5 milliGRAM(s) Oral at bedtime  chlorhexidine 2% Cloths 1 Application(s) Topical daily  glucagon  Injectable 1 milliGRAM(s) IntraMuscular once  latanoprost 0.005% Ophthalmic Solution 1 Drop(s) Both EYES at bedtime  lidocaine   4% Patch 1 Patch Transdermal every 24 hours  metoprolol tartrate 25 milliGRAM(s) Oral every 12 hours  metoprolol tartrate 12.5 milliGRAM(s) Oral once  pantoprazole    Tablet 40 milliGRAM(s) Oral before breakfast  polyethylene glycol 3350 17 Gram(s) Oral daily  sacubitril 49 mG/valsartan 51 mG 1 Tablet(s) Oral every 12 hours  senna 2 Tablet(s) Oral at bedtime  sodium chloride 0.9% lock flush 3 milliLiter(s) IV Push every 8 hours  sodium chloride 0.9%. 1000 milliLiter(s) (10 mL/Hr) IV Continuous <Continuous>    MEDICATIONS  (PRN):  acetaminophen     Tablet .. 650 milliGRAM(s) Oral every 6 hours PRN Moderate Pain (4 - 6)  melatonin 5 milliGRAM(s) Oral at bedtime PRN Sleep    RADIOLOGY & ADDITIONAL TESTS:    < from: Xray Chest 2 Views PA/Lat (01.07.25 @ 09:07) >  IMPRESSION:    Left-sided implanted cardiac device. No pneumothorax identified. Small   left pleural effusion. Postop changes. Limited exam secondary to   technical factors.    < end of copied text >     Patient discussed on morning rounds with Dr. Logan    OPERATION & DATE: 12/23/24 AVR, ascending aorta replacement, cryomaze, JIM closure (35 mm device), EF 55-60%    SUBJECTIVE ASSESSMENT:  59yoF seen and assessed at bedside. No acute complaints. Denies SOB, chest pain, fever, chills, nausea, vomiting.     VITAL SIGNS:  Vital Signs Last 24 Hrs  T(C): 36.6 (07 Jan 2025 09:07), Max: 36.7 (06 Jan 2025 23:59)  T(F): 97.9 (07 Jan 2025 09:07), Max: 98 (06 Jan 2025 23:59)  HR: 78 (07 Jan 2025 11:05) (78 - 116)  BP: 132/85 (07 Jan 2025 11:05) (90/55 - 132/85)  BP(mean): 105 (07 Jan 2025 11:05) (67 - 105)  RR: 18 (07 Jan 2025 11:05) (16 - 20)  SpO2: 98% (07 Jan 2025 11:05) (93% - 99%)    Parameters below as of 07 Jan 2025 11:05  Patient On (Oxygen Delivery Method): room air      I&O's Detail    06 Jan 2025 07:01  -  07 Jan 2025 07:00  --------------------------------------------------------  IN:    Oral Fluid: 360 mL  Total IN: 360 mL    OUT:    Voided (mL): 300 mL  Total OUT: 300 mL    Total NET: 60 mL      07 Jan 2025 07:01  -  07 Jan 2025 12:56  --------------------------------------------------------  IN:    Oral Fluid: 500 mL  Total IN: 500 mL    OUT:  Total OUT: 0 mL    Total NET: 500 mL    CHEST TUBE:  no  MINDA DRAIN:  no  EPICARDIAL WIRES: no   STITCHES: no  STAPLES: no  TRAN:  no  CENTRAL LINE: no  MIDLINE/PICC: no  WOUND VAC: no    Physical Exam  CONSTITUTIONAL: Well appearing in NAD assessed sitting comfortably in chair  NEURO: A&OX3. No focal deficits noted, moving bilateral upper and lower extremities                    CV: RRR, no murmurs, rubs, gallops  RESPIRATORY: Clear to auscultation bilateral posterior lung fields, no wheezes, rales, rhonchi   GI: +BS, NT/ND  MUSKULOSKELETAL: No peripheral edema or calf tenderness. Full strength and ROM bilateral upper and lower extremities   VASCULAR: moving spontaneously  INCISIONS: MSI clean, dry, intact, no sternal click     LABS:                        8.5    6.57  )-----------( 534      ( 07 Jan 2025 05:30 )             26.7     PT/INR - ( 06 Jan 2025 05:21 )   PT: 13.0 sec;   INR: 1.11          PTT - ( 06 Jan 2025 05:21 )  PTT:35.8 sec  01-07    130[L]  |  98  |  13  ----------------------------<  125[H]  4.3   |  24  |  0.52    Ca    8.5      07 Jan 2025 05:30  Mg     1.7     01-07    TPro  6.7  /  Alb  3.5  /  TBili  0.3  /  DBili  x   /  AST  39  /  ALT  16  /  AlkPhos  154[H]  01-06    Urinalysis Basic - ( 07 Jan 2025 05:30 )    Color: x / Appearance: x / SG: x / pH: x  Gluc: 125 mg/dL / Ketone: x  / Bili: x / Urobili: x   Blood: x / Protein: x / Nitrite: x   Leuk Esterase: x / RBC: x / WBC x   Sq Epi: x / Non Sq Epi: x / Bacteria: x      MEDICATIONS  (STANDING):  apixaban 2.5 milliGRAM(s) Oral every 12 hours  aspirin enteric coated 81 milliGRAM(s) Oral daily  atorvastatin 40 milliGRAM(s) Oral at bedtime  bisacodyl 5 milliGRAM(s) Oral at bedtime  chlorhexidine 2% Cloths 1 Application(s) Topical daily  glucagon  Injectable 1 milliGRAM(s) IntraMuscular once  latanoprost 0.005% Ophthalmic Solution 1 Drop(s) Both EYES at bedtime  lidocaine   4% Patch 1 Patch Transdermal every 24 hours  metoprolol tartrate 25 milliGRAM(s) Oral every 12 hours  metoprolol tartrate 12.5 milliGRAM(s) Oral once  pantoprazole    Tablet 40 milliGRAM(s) Oral before breakfast  polyethylene glycol 3350 17 Gram(s) Oral daily  sacubitril 49 mG/valsartan 51 mG 1 Tablet(s) Oral every 12 hours  senna 2 Tablet(s) Oral at bedtime  sodium chloride 0.9% lock flush 3 milliLiter(s) IV Push every 8 hours  sodium chloride 0.9%. 1000 milliLiter(s) (10 mL/Hr) IV Continuous <Continuous>    MEDICATIONS  (PRN):  acetaminophen     Tablet .. 650 milliGRAM(s) Oral every 6 hours PRN Moderate Pain (4 - 6)  melatonin 5 milliGRAM(s) Oral at bedtime PRN Sleep    RADIOLOGY & ADDITIONAL TESTS:    < from: Xray Chest 2 Views PA/Lat (01.07.25 @ 09:07) >  IMPRESSION:    Left-sided implanted cardiac device. No pneumothorax identified. Small   left pleural effusion. Postop changes. Limited exam secondary to   technical factors.    < end of copied text >

## 2025-01-07 NOTE — PROGRESS NOTE ADULT - SUBJECTIVE AND OBJECTIVE BOX
EPS Device interrogation    Indication: post implant    Device model: [employer] Accolade 			Implanting Physician:      Functioning Mode: DDD  			    Underlying Rhythm: A.Fib /VS    Pacemaker dependency:  No    Battery status: 100% (LEON)  Interrogating parameters:   				RA			RV			LV    Sense:                                        4.6    mV                       10.8  mV    Threshold:                              not done  A.Fib           0.4 V@ 0.5 ms    Pacing Impedance:                      [id number] om                          679  om    Shock Impedance:                                                            n/a     Events/Alert:  none    Parameter change: 	none    Normal function ICD  implant site no bleeding no swelling, no hematoma   cxr - done leads are in good position, no pneumothorax  F/u Dr. Mora  EPS 1/30/25 @ 11:00 am , [phone]    [ ]EPS attending: Interrogation reviewed. Agree with above.

## 2025-01-07 NOTE — PROGRESS NOTE ADULT - ASSESSMENT
A/P:    59 y/o Setswana-speaking F with PMHx of HTN, HLD, Anemia, NSTEMI 2011 (no PCI), ?TIA in  (no deficits), pAfib (on Eliquis), HFpEF (EF 55-60%), gallstones who presented to [facility] for pre-op optimization. On 24 patient underwent AVR and ascending aorta replacement, cryo maze and JIM appendage closure (35mm). Post operatively brought to CTICU in stable condition. Dobutamine and primacor added. POD1 Stroke code called for lethargy and L sided weakness w/u negative. EEG placed for noted arm jerking- negative. POD2 Extubated to HFNC for positive pressure.POD3 Remained on low dose  and primacor. POD4 dobutamine weaned off, swan removed. Meds x 2 removed. Weaned from HFNC to NC. POD5 Primacor weaned off. POD6 diuresed. BB started. Noted to have conversion pause so pacing wires kept in. Pigtail placed for R pleural effusion. POD7 pigtail and pacing wires removed. Transferred to [facility]. POD 8 Increased entresto dose. Post-op TTE done with normal function of bioprosthetic valve. Statin re-started. POD 9 restarted 1/2 dose eliquis per Dr. Arizmendi/Doreen given extensive aortic surgery. Early on POD13 pt with 6 second conversion pause, 6 seconds. EP consulted. POD14 PPM placed. POD15 pending discharge to rehab.      Neurovascular:   - No delirium. Pain well controlled with current regimen.  -Continue tylenol PRN    Cardiovascular:   - POD_ s/p _  -Hemodynamically stable. HR controlled (X-X)  - Hx of HTN, BP controlled (X-X)  - ASA, Plavix, BB, statin  - EKG. TTE. Cardiac Panel. Lipid Panel. BNP.      Respiratory:   - 02 Sat = 98% on RA.  -If on oxygen wean to RA from for O2 Sat > 93%.  -Encourage C+DB and Use of IS 10x / hr while awake.  -CXR.    GI:   - Stable.  -NPO after MN.  -Continue GI PPX with protonix  -PO Diet.    Renal / :  - BUN/Cr stable at X/X  -Continue to monitor renal function.  -Monitor I/O's.  - Replete electrolytes PRN    Endocrine:    -A1c.  -TSH.    Hematologic:  -H/H stable at X/X with no obvious signs of bleeding  -Coagulation Panel.    ID:  -Pt remains afebrile with no elevation in WBC or signs of infection  -Continue to monitor CBC  -Observe for SIRS/Sepsis Syndrome.    Prophylaxis:  -DVT prophylaxis with 5000 SubQ Heparin q8h.  -SCD's    Disposition:  -Home when medically appropriate.   A/P:    59 y/o Slovenian-speaking F with PMHx of HTN, HLD, Anemia, NSTEMI 2011 (no PCI), ?TIA in  (no deficits), pAfib (on Eliquis), HFpEF (EF 55-60%), gallstones who presented to [facility] for pre-op optimization. On 24 patient underwent AVR and ascending aorta replacement, cryo maze and JIM appendage closure (35mm). Post operatively brought to CTICU in stable condition. Dobutamine and primacor added. POD1 Stroke code called for lethargy and L sided weakness w/u negative. EEG placed for noted arm jerking- negative. POD2 Extubated to HFNC for positive pressure.POD3 Remained on low dose  and primacor. POD4 dobutamine weaned off, swan removed. Meds x 2 removed. Weaned from HFNC to NC. POD5 Primacor weaned off. POD6 diuresed. BB started. Noted to have conversion pause so pacing wires kept in. Pigtail placed for R pleural effusion. POD7 pigtail and pacing wires removed. Transferred to [facility]. POD 8 Increased entresto dose. Post-op TTE done with normal function of bioprosthetic valve. Statin re-started. POD 9 restarted 1/2 dose eliquis per Dr. Arizmendi/Doreen given extensive aortic surgery. Early on POD13 pt with 6 second conversion pause, 6 seconds. EP consulted. POD14 PPM placed. POD15 pending discharge to rehab.      Neurovascular:   - No delirium. Pain well controlled with current regimen.  -Continue tylenol PRN  - Continue melatonin PRN insomnia     HEENT:  - Continue xalantan eye gtts     Cardiovascular:   - POD15 s/p AVR, ascending, hemiarch replacement   -Hemodynamically stable. HR controlled ()  - Hx of HTN, BP controlled (108//85), continue metoprolol 25 mg q12 hours  - Hx AF s/p JIM clip: continue eliquis 2.5 mg q12 hours  - Pos op conversion pauses s/p PPM /6  - HFpEF: continue entresto 49/51   - Continue ASA 81 mg daily   - HLD: continue atorvastatin 40 mg qhs    Respiratory:   - 02 Sat = 98% on RA..  -Encourage C+DB and Use of IS 10x / hr while awake.  -CXR without effusions, PTX, consolidations     GI:   -Continue GI PPX with protonix  - Bowel reg: continue dulcolax, miralax, senna   -PO Diet.    Renal / :  - BUN/Cr stable at 13/0.53  -Continue to monitor renal function.  -Monitor I/O's.  - Replete electrolytes PRN    Endocrine:    -A1c 6.3, no known hx DM  -TSH 3.470, no known hx thyroid disease    Hematologic:  -H/H stable at 8.5/26.7 with no obvious signs of bleeding  -Coagulation Panel.    ID:  -Pt remains afebrile with no elevation in WBC or signs of infection  -Continue to monitor CBC  -Observe for SIRS/Sepsis Syndrome.    Prophylaxis:  -DVT prophylaxis with eliquis 2.5 mg q12 hours   -SCD's    Disposition:  -Home when medically appropriate.   57 y/o Slovak-speaking F with PMHx of HTN, HLD, Anemia, NSTEMI 2011 (no PCI), ?TIA in 2011 (no deficits), pAfib (on Eliquis), HFpEF (EF 55-60%), gallstones who presented to [facility] for pre-op optimization. On 24 patient underwent AVR and ascending aorta replacement, cryo maze and JIM appendage closure (35mm). Post operatively brought to CTICU in stable condition. Dobutamine and primacor added. POD1 Stroke code called for lethargy and L sided weakness w/u negative. EEG placed for noted arm jerking- negative. POD2 Extubated to HFNC for positive pressure.POD3 Remained on low dose  and primacor. POD4 dobutamine weaned off, swan removed. Meds x 2 removed. Weaned from HFNC to NC. POD5 Primacor weaned off. POD6 diuresed. BB started. Noted to have conversion pause so pacing wires kept in. Pigtail placed for R pleural effusion. POD7 pigtail and pacing wires removed. Transferred to [facility]. POD 8 Increased entresto dose. Post-op TTE done with normal function of bioprosthetic valve. Statin re-started. POD 9 restarted 1/2 dose eliquis per Dr. Arizmendi/Doreen given extensive aortic surgery. Early on POD13 pt with 6 second conversion pause. EP consulted. POD14 PPM placed. POD15 pending discharge to rehab.  Per EP 2.5 eliquis restarted. BB restarted. Plan for ablation/DCCV when cleared to tolerate full dose AC (eliquis 5 BID).    Neurovascular:   - No delirium. Pain well controlled with current regimen.  - Continue tylenol PRN  - Continue melatonin PRN insomnia     HEENT:  - Continue xalantan eye gtts     Cardiovascular:   POD15 s/p AVR, ascending, hemiarch replacement   - Hemodynamically stable. HR controlled ()  - Hx of HTN, BP controlled (108//85), continue metoprolol 25 mg q12 hours  - Hx AF s/p JIM clip: continue eliquis 2.5 mg q12 hours  - EP following with plan for ablation and DCCV  - Pos op conversion pauses s/p PPM 1/6  - HFpEF: continue entresto 49/51   - Continue ASA 81 mg daily   - HLD: continue atorvastatin 40 mg qhs    Respiratory:   - 02 Sat = 98% on RA..  -Encourage C+DB and Use of IS 10x / hr while awake.  -CXR without effusions, PTX, consolidations     GI:   -Continue GI PPX with protonix  - Bowel reg: continue dulcolax, miralax, senna   -PO Diet.    Renal / :  - BUN/Cr stable at 13/0.53  -Continue to monitor renal function.  -Monitor I/O's.  - Replete electrolytes PRN    Endocrine:    -A1c 6.3, no known hx DM  -TSH 3.470, no known hx thyroid disease    Hematologic:  -H/H stable at 8.5/26.7 with no obvious signs of bleeding  -Coagulation Panel.    ID:  -Pt remains afebrile with no elevation in WBC or signs of infection  -Continue to monitor CBC  -Observe for SIRS/Sepsis Syndrome.    Prophylaxis:  -DVT prophylaxis with eliquis 2.5 mg q12 hours   -SCD's    Disposition:  -AR when able

## 2025-01-08 LAB
ANION GAP SERPL CALC-SCNC: 8 MMOL/L — SIGNIFICANT CHANGE UP (ref 5–17)
APTT BLD: 38.8 SEC — HIGH (ref 24.5–35.6)
BUN SERPL-MCNC: 8 MG/DL — SIGNIFICANT CHANGE UP (ref 7–23)
CALCIUM SERPL-MCNC: 9 MG/DL — SIGNIFICANT CHANGE UP (ref 8.4–10.5)
CHLORIDE SERPL-SCNC: 99 MMOL/L — SIGNIFICANT CHANGE UP (ref 96–108)
CO2 SERPL-SCNC: 28 MMOL/L — SIGNIFICANT CHANGE UP (ref 22–31)
CREAT SERPL-MCNC: 0.56 MG/DL — SIGNIFICANT CHANGE UP (ref 0.5–1.3)
EGFR: 104 ML/MIN/1.73M2 — SIGNIFICANT CHANGE UP
GLUCOSE SERPL-MCNC: 107 MG/DL — HIGH (ref 70–99)
HCT VFR BLD CALC: 26.9 % — LOW (ref 34.5–45)
HGB BLD-MCNC: 9.1 G/DL — LOW (ref 11.5–15.5)
INR BLD: 1.26 — HIGH (ref 0.85–1.16)
MAGNESIUM SERPL-MCNC: 1.8 MG/DL — SIGNIFICANT CHANGE UP (ref 1.6–2.6)
MCHC RBC-ENTMCNC: 32 PG — SIGNIFICANT CHANGE UP (ref 27–34)
MCHC RBC-ENTMCNC: 33.8 G/DL — SIGNIFICANT CHANGE UP (ref 32–36)
MCV RBC AUTO: 94.7 FL — SIGNIFICANT CHANGE UP (ref 80–100)
NRBC # BLD: 0 /100 WBCS — SIGNIFICANT CHANGE UP (ref 0–0)
PHOSPHATE SERPL-MCNC: 3.7 MG/DL — SIGNIFICANT CHANGE UP (ref 2.5–4.5)
PLATELET # BLD AUTO: 477 K/UL — HIGH (ref 150–400)
POTASSIUM SERPL-MCNC: 4.1 MMOL/L — SIGNIFICANT CHANGE UP (ref 3.5–5.3)
POTASSIUM SERPL-SCNC: 4.1 MMOL/L — SIGNIFICANT CHANGE UP (ref 3.5–5.3)
PROTHROM AB SERPL-ACNC: 14.7 SEC — HIGH (ref 9.9–13.4)
RBC # BLD: 2.84 M/UL — LOW (ref 3.8–5.2)
RBC # FLD: 14.6 % — HIGH (ref 10.3–14.5)
SODIUM SERPL-SCNC: 135 MMOL/L — SIGNIFICANT CHANGE UP (ref 135–145)
WBC # BLD: 5.66 K/UL — SIGNIFICANT CHANGE UP (ref 3.8–10.5)
WBC # FLD AUTO: 5.66 K/UL — SIGNIFICANT CHANGE UP (ref 3.8–10.5)

## 2025-01-08 RX ORDER — METOPROLOL TARTRATE 50 MG
37.5 TABLET ORAL EVERY 12 HOURS
Refills: 0 | Status: DISCONTINUED | OUTPATIENT
Start: 2025-01-08 | End: 2025-01-09

## 2025-01-08 RX ORDER — METOPROLOL TARTRATE 50 MG
12.5 TABLET ORAL ONCE
Refills: 0 | Status: COMPLETED | OUTPATIENT
Start: 2025-01-08 | End: 2025-01-08

## 2025-01-08 RX ADMIN — ACETAMINOPHEN 650 MILLIGRAM(S): 80 SOLUTION/ DROPS ORAL at 08:19

## 2025-01-08 RX ADMIN — APIXABAN 2.5 MILLIGRAM(S): 5 TABLET, FILM COATED ORAL at 06:31

## 2025-01-08 RX ADMIN — ACETAMINOPHEN 650 MILLIGRAM(S): 80 SOLUTION/ DROPS ORAL at 09:56

## 2025-01-08 RX ADMIN — ACETAMINOPHEN 650 MILLIGRAM(S): 80 SOLUTION/ DROPS ORAL at 18:56

## 2025-01-08 RX ADMIN — LIDOCAINE 1 PATCH: 50 OINTMENT TOPICAL at 22:44

## 2025-01-08 RX ADMIN — SODIUM CHLORIDE 3 MILLILITER(S): 9 INJECTION, SOLUTION INTRAMUSCULAR; INTRAVENOUS; SUBCUTANEOUS at 06:49

## 2025-01-08 RX ADMIN — Medication 37.5 MILLIGRAM(S): at 17:09

## 2025-01-08 RX ADMIN — ACETAMINOPHEN 650 MILLIGRAM(S): 80 SOLUTION/ DROPS ORAL at 17:52

## 2025-01-08 RX ADMIN — CHLORHEXIDINE GLUCONATE 1 APPLICATION(S): 1.2 RINSE ORAL at 06:49

## 2025-01-08 RX ADMIN — Medication 17 GRAM(S): at 11:03

## 2025-01-08 RX ADMIN — ATORVASTATIN CALCIUM 40 MILLIGRAM(S): 40 TABLET, FILM COATED ORAL at 22:43

## 2025-01-08 RX ADMIN — LATANOPROST 1 DROP(S): 50 SOLUTION OPHTHALMIC at 22:44

## 2025-01-08 RX ADMIN — SODIUM CHLORIDE 3 MILLILITER(S): 9 INJECTION, SOLUTION INTRAMUSCULAR; INTRAVENOUS; SUBCUTANEOUS at 22:44

## 2025-01-08 RX ADMIN — Medication 81 MILLIGRAM(S): at 11:01

## 2025-01-08 RX ADMIN — Medication 12.5 MILLIGRAM(S): at 09:02

## 2025-01-08 RX ADMIN — Medication 25 MILLIGRAM(S): at 06:31

## 2025-01-08 RX ADMIN — SODIUM CHLORIDE 3 MILLILITER(S): 9 INJECTION, SOLUTION INTRAMUSCULAR; INTRAVENOUS; SUBCUTANEOUS at 13:32

## 2025-01-08 RX ADMIN — Medication 800 MILLIGRAM(S): at 09:03

## 2025-01-08 RX ADMIN — LIDOCAINE 1 PATCH: 50 OINTMENT TOPICAL at 18:56

## 2025-01-08 RX ADMIN — APIXABAN 2.5 MILLIGRAM(S): 5 TABLET, FILM COATED ORAL at 17:09

## 2025-01-08 RX ADMIN — SACUBITRIL AND VALSARTAN 1 TABLET(S): 24; 26 TABLET, FILM COATED ORAL at 09:02

## 2025-01-08 RX ADMIN — LIDOCAINE 1 PATCH: 50 OINTMENT TOPICAL at 11:01

## 2025-01-08 RX ADMIN — PANTOPRAZOLE 40 MILLIGRAM(S): 40 TABLET, DELAYED RELEASE ORAL at 06:31

## 2025-01-08 NOTE — PROGRESS NOTE ADULT - ASSESSMENT
Assessment:    Plan:    Neurovascular:   -Pain well controlled with current regimen. PRN's:    Cardiovascular:   -Hemodynamically stable.   -Monitor: BP, HR, tele    Respiratory:   -Oxygenating well on room air  -Encourage continued use of IS 10x/hr and frequent ambulation  -CXR:    GI:  -GI PPX:  -PO Diet  -Bowel Regimen:     Renal / :  -Continue to monitor renal function: BUN/Cr  -Monitor I/O's daily     Endocrine:    -No hx of DM or thyroid dx  -A1c:  -TSH:    Hematologic:  -CBC: H/H-  -Coagulation Panel.    ID:  -Temperature:   -CBC: WBC-    Prophylaxis:  -DVT prophylaxis with 5000 SubQ Heparin q8h.  -Continue with SCD's b/l while patient is at rest     Disposition:  -Discharge home once patient is medically ready   Assessment:  57 y/o Khmer-speaking F with PMHx of HTN, HLD, Anemia, NSTEMI 2011 (no PCI), ?TIA in 2011 (no deficits), pAfib (on Eliquis), HFpEF (EF 55-60%), gallstones who presented to [facility] for pre-op optimization. On 24 patient underwent AVR and ascending aorta replacement, cryo maze and JIM appendage closure (35mm). Post operatively brought to CTICU in stable condition. Dobutamine and primacor added. POD1 Stroke code called for lethargy and L sided weakness w/u negative. EEG placed for noted arm jerking- negative. POD2 Extubated to HFNC for positive pressure. POD3 Remained on low dose  and primacor. POD4 dobutamine weaned off, swan removed. Meds x 2 removed. Weaned from HFNC to NC. POD5 Primacor weaned off. POD6 diuresed. BB started. Noted to have conversion pause so pacing wires kept in. Pigtail placed for R pleural effusion. POD7 pigtail and pacing wires removed. Transferred to [facility]. POD 8 Increased entresto dose. Post-op TTE done with normal function of bioprosthetic valve. Statin re-started. POD 9 restarted 1/2 dose eliquis per Dr. Arizmendi/Doreen given extensive aortic surgery. Early on POD13 pt with 6 second conversion pause. EP consulted. POD14 PPM placed. POD15 pending discharge to rehab. Per EP 2.5 eliquis restarted. BB restarted. Plan for ablation/DCCV when cleared to tolerate full dose AC (eliquis 5 BID). Patient is ready for discharge to home pending conversation with son.    Plan:    Neurovascular:   - No delirium. Pain well controlled with current regimen.  - Continue tylenol PRN  - Continue melatonin PRN insomnia     HEENT:  - Continue xalantan eye gtts     Cardiovascular:   POD16 s/p AVR, ascending, hemiarch replacement   - Hemodynamically stable. HR controlled ()  - Hx of HTN, BP controlled (108//85), continue metoprolol 25 mg q12 hours  - Hx AF s/p JIM clip: continue eliquis 2.5 mg q12 hours  - EP following with plan for ablation and DCCV  - Post op conversion pauses. Now s/p PPM   - HFpEF: continue entresto 49/51   - Continue ASA 81 mg daily   - HLD: continue atorvastatin 40 mg qhs    Respiratory:   - O2 Sat = 98% on RA..  -Encourage C+DB and Use of IS 10x / hr while awake.  -CXR without effusions, PTX, consolidations     GI:   -Continue GI PPX with protonix  - Bowel reg: continue dulcolax, miralax, senna   -PO Diet.    Renal / :  - BUN/Cr stable at 13/0.53  -Continue to monitor renal function.  -Monitor I/O's.  - Replete electrolytes PRN    Endocrine:    -A1c 6.3, no known hx DM  -TSH 3.470, no known hx thyroid disease    Hematologic:  -H/H stable at 8.5/26.7 with no obvious signs of bleeding  -Coagulation Panel.    ID:  -Pt remains afebrile with no elevation in WBC or signs of infection  -Continue to monitor CBC  -Observe for SIRS/Sepsis Syndrome.    Prophylaxis:  -DVT prophylaxis with eliquis 2.5 mg q12 hours   -SCD's    Disposition:  -AR when able   Assessment:  59 y/o Italian-speaking F with PMHx of HTN, HLD, Anemia, NSTEMI 2011 (no PCI), ?TIA in 2011 (no deficits), pAfib (on Eliquis), HFpEF (EF 55-60%), gallstones who presented to [facility] for pre-op optimization. On 24 patient underwent AVR and ascending aorta replacement, cryo maze and JIM appendage closure (35mm). Post operatively brought to CTICU in stable condition. Dobutamine and primacor added. POD1 Stroke code called for lethargy and L sided weakness w/u negative. EEG placed for noted arm jerking- negative. POD2 Extubated to HFNC for positive pressure. POD3 Remained on low dose  and primacor. POD4 dobutamine weaned off, swan removed. Meds x 2 removed. Weaned from HFNC to NC. POD5 Primacor weaned off. POD6 diuresed. BB started. Noted to have conversion pause so pacing wires kept in. Pigtail placed for R pleural effusion. POD7 pigtail and pacing wires removed. Transferred to [facility]. POD 8 Increased entresto dose. Post-op TTE done with normal function of bioprosthetic valve. Statin re-started. POD 9 restarted 1/2 dose eliquis per Dr. Arizmendi/Doreen given extensive aortic surgery. Early on POD13 pt with 6 second conversion pause. EP consulted. POD14 PPM placed. POD15 pending discharge to rehab. Per EP 2.5 eliquis restarted. BB restarted. Plan for ablation/DCCV when cleared to tolerate full dose AC (eliquis 5 BID). Patient is ready for discharge to home pending conversation with son.    Plan:    Neurovascular:   - No delirium. Pain well controlled with current regimen.  - Continue tylenol PRN  - Continue melatonin PRN insomnia     HEENT:  - Continue xalantan eye gtts     Cardiovascular:   POD16 s/p AVR, ascending, hemiarch replacement   - Hemodynamically stable. HR controlled ()  - Hx of HTN, BP controlled (108//85), continue metoprolol 25 mg q12 hours  - Hx AF s/p JIM clip: continue eliquis 2.5 mg q12 hours  - EP following with plan for ablation and DCCV  - Post op conversion pauses. Now s/p PPM   - HFpEF: continue entresto 49/51   - Continue ASA 81 mg daily   - HLD: continue atorvastatin 40 mg qhs    Respiratory:   - O2 Sat = 98% on RA..  -Encourage C+DB and Use of IS 10x / hr while awake.  -CXR without effusions, PTX, consolidations     GI:   -Continue GI PPX with protonix  - Bowel reg: continue dulcolax, miralax, senna   -PO Diet.    Renal / :  - BUN/Cr stable at 8/0.56  -Continue to monitor renal function.  -Monitor I/O's.  - Replete electrolytes PRN    Endocrine:    -A1c 6.3, no known hx DM  -TSH 3.470, no known hx thyroid disease    Hematologic:  -H/H stable at /26.7 with no obvious signs of bleeding  -Coagulation Panel.    ID:  -Pt remains afebrile with no elevation in WBC or signs of infection  -Continue to monitor CBC  -Observe for SIRS/Sepsis Syndrome.    Prophylaxis:  -DVT prophylaxis with eliquis 2.5 mg q12 hours   -SCD's    Disposition:  -AR when able   Assessment:  57 y/o Macedonian-speaking F with PMHx of HTN, HLD, Anemia, NSTEMI 2011 (no PCI), ?TIA in 2011 (no deficits), pAfib (on Eliquis), HFpEF (EF 55-60%), gallstones who presented to [facility] for pre-op optimization. On 24 patient underwent AVR and ascending aorta replacement, cryo maze and JIM appendage closure (35mm). Post operatively brought to CTICU in stable condition. Dobutamine and primacor added. POD1 Stroke code called for lethargy and L sided weakness w/u negative. EEG placed for noted arm jerking- negative. POD2 Extubated to HFNC for positive pressure. POD3 Remained on low dose  and primacor. POD4 dobutamine weaned off, swan removed. Meds x 2 removed. Weaned from HFNC to NC. POD5 Primacor weaned off. POD6 diuresed. BB started. Noted to have conversion pause so pacing wires kept in. Pigtail placed for R pleural effusion. POD7 pigtail and pacing wires removed. Transferred to [facility]. POD 8 Increased entresto dose. Post-op TTE done with normal function of bioprosthetic valve. Statin re-started. POD 9 restarted 1/2 dose eliquis per Dr. Arizmendi/Doreen given extensive aortic surgery. Early on POD13 pt with 6 second conversion pause. EP consulted. POD14 PPM placed. POD15 pending discharge to rehab. Per EP 2.5 eliquis restarted. BB restarted. Plan for ablation/DCCV when cleared to tolerate full dose AC (eliquis 5 BID). Patient is ready for discharge to home pending conversation with son.    Plan:    Neurovascular:   - No delirium. Pain well controlled with current regimen.  - Continue tylenol PRN  - Continue melatonin PRN insomnia     HEENT:  - Continue xalantan eye gtts     Cardiovascular:   POD16 s/p AVR, ascending, hemiarch replacement   - Hemodynamically stable. HR controlled ()  - Hx of HTN, BP controlled (108//85), continue metoprolol 25 mg q12 hours  - Hx AF s/p JIM clip: continue eliquis 2.5 mg q12 hours  - EP following with plan for ablation and DCCV  - Post op conversion pauses. Now s/p PPM   - HFpEF: continue entresto 49/51   - Continue ASA 81 mg daily   - HLD: continue atorvastatin 40 mg qhs    Respiratory:   - O2 Sat = 98% on RA..  -Encourage C+DB and Use of IS 10x / hr while awake.  -CXR without effusions, PTX, consolidations     GI:   -Continue GI PPX with protonix  - Bowel reg: continue dulcolax, miralax, senna   -PO Diet.    Renal / :  - BUN/Cr stable at 8/0.56  -Continue to monitor renal function.  -Monitor I/O's.  - Replete electrolytes PRN    Endocrine:    -A1c 6.3, no known hx DM  -TSH 3.470, no known hx thyroid disease    Hematologic:  -H/H stable at 9.1/26.9 with no obvious signs of bleeding  -Coagulation Panel.    ID:  -Pt remains afebrile with no elevation in WBC or signs of infection  -Continue to monitor CBC  -Observe for SIRS/Sepsis Syndrome.    Prophylaxis:  -DVT prophylaxis with eliquis 2.5 mg q12 hours   -SCD's    Disposition:  -Home when able

## 2025-01-08 NOTE — PROGRESS NOTE ADULT - SUBJECTIVE AND OBJECTIVE BOX
Patient discussed on morning rounds with       OPERATION & DATE:    SUBJECTIVE ASSESSMENT:    VITAL SIGNS:  Vital Signs Last 24 Hrs  T(C): 36.6 (08 Jan 2025 08:57), Max: 36.6 (08 Jan 2025 05:13)  T(F): 97.8 (08 Jan 2025 08:57), Max: 97.9 (08 Jan 2025 05:13)  HR: 82 (08 Jan 2025 09:51) (78 - 96)  BP: 106/57 (08 Jan 2025 09:51) (101/58 - 165/80)  BP(mean): 75 (08 Jan 2025 09:51) (73 - 118)  RR: 14 (08 Jan 2025 09:51) (14 - 18)  SpO2: 98% (08 Jan 2025 08:17) (91% - 99%)    Parameters below as of 08 Jan 2025 08:17  Patient On (Oxygen Delivery Method): room air      I&O's Detail    07 Jan 2025 07:01  -  08 Jan 2025 07:00  --------------------------------------------------------  IN:    Oral Fluid: 1150 mL  Total IN: 1150 mL    OUT:  Total OUT: 0 mL    Total NET: 1150 mL        CHEST TUBE:    MINDA DRAIN:    EPICARDIAL WIRES:   STITCHES:  STAPLES:  TRAN:   CENTRAL LINE:  MIDLINE/PICC:  WOUND VAC:    PHYSICAL EXAM:  General:  HEENT:  Cardio:  Pulm:  GI:  Extremities:  Vascular:  Incisions:    LABS:                        9.1    5.66  )-----------( 477      ( 08 Jan 2025 05:30 )             26.9     PT/INR - ( 08 Jan 2025 05:30 )   PT: 14.7 sec;   INR: 1.26          PTT - ( 08 Jan 2025 05:30 )  PTT:38.8 sec  01-08    135  |  99  |  8   ----------------------------<  107[H]  4.1   |  28  |  0.56    Ca    9.0      08 Jan 2025 05:30  Phos  3.7     01-08  Mg     1.8     01-08      Urinalysis Basic - ( 08 Jan 2025 05:30 )    Color: x / Appearance: x / SG: x / pH: x  Gluc: 107 mg/dL / Ketone: x  / Bili: x / Urobili: x   Blood: x / Protein: x / Nitrite: x   Leuk Esterase: x / RBC: x / WBC x   Sq Epi: x / Non Sq Epi: x / Bacteria: x      MEDICATIONS  (STANDING):  apixaban 2.5 milliGRAM(s) Oral every 12 hours  aspirin enteric coated 81 milliGRAM(s) Oral daily  atorvastatin 40 milliGRAM(s) Oral at bedtime  bisacodyl 5 milliGRAM(s) Oral at bedtime  chlorhexidine 2% Cloths 1 Application(s) Topical daily  glucagon  Injectable 1 milliGRAM(s) IntraMuscular once  latanoprost 0.005% Ophthalmic Solution 1 Drop(s) Both EYES at bedtime  lidocaine   4% Patch 1 Patch Transdermal every 24 hours  metoprolol tartrate 37.5 milliGRAM(s) Oral every 12 hours  pantoprazole    Tablet 40 milliGRAM(s) Oral before breakfast  polyethylene glycol 3350 17 Gram(s) Oral daily  sacubitril 49 mG/valsartan 51 mG 1 Tablet(s) Oral every 12 hours  senna 2 Tablet(s) Oral at bedtime  sodium chloride 0.9% lock flush 3 milliLiter(s) IV Push every 8 hours  sodium chloride 0.9%. 1000 milliLiter(s) (10 mL/Hr) IV Continuous <Continuous>    MEDICATIONS  (PRN):  acetaminophen     Tablet .. 650 milliGRAM(s) Oral every 6 hours PRN Moderate Pain (4 - 6)  melatonin 5 milliGRAM(s) Oral at bedtime PRN Sleep    RADIOLOGY & ADDITIONAL TESTS:     Patient discussed on morning rounds with Dr. Logan     OPERATION & DATE: 12/23/2024: AVR, ascending aorta replacement, cryomaze, and JIM appendage closure (35 mm), EF normal    SUBJECTIVE ASSESSMENT:    Patient assessed at bedside. She is sitting comfortably in chair and walked with ease. No acute complaints. Denies palpitations, lightheadedness, SOB, SIDDIQUI, chest pain, n/v/f/c.    VITAL SIGNS:  Vital Signs Last 24 Hrs  T(C): 36.6 (08 Jan 2025 08:57), Max: 36.6 (08 Jan 2025 05:13)  T(F): 97.8 (08 Jan 2025 08:57), Max: 97.9 (08 Jan 2025 05:13)  HR: 82 (08 Jan 2025 09:51) (78 - 96)  BP: 106/57 (08 Jan 2025 09:51) (101/58 - 165/80)  BP(mean): 75 (08 Jan 2025 09:51) (73 - 118)  RR: 14 (08 Jan 2025 09:51) (14 - 18)  SpO2: 98% (08 Jan 2025 08:17) (91% - 99%)    Parameters below as of 08 Jan 2025 08:17  Patient On (Oxygen Delivery Method): room air      I&O's Detail    07 Jan 2025 07:01  -  08 Jan 2025 07:00  --------------------------------------------------------  IN:    Oral Fluid: 1150 mL  Total IN: 1150 mL    OUT:  Total OUT: 0 mL    Total NET: 1150 mL        CHEST TUBE: no  MINDA DRAIN: no   EPICARDIAL WIRES: no   STITCHES: no  STAPLES: no  TRAN: no  CENTRAL LINE: no  MIDLINE/PICC: no  WOUND VAC: no    PHYSICAL EXAM:  General: Well appearing, NAD. Patient sitting comfortably in chair and seen walking well.  Neuro: AxOx3 with no focal deficits. Actively moving all extremities.  Cardio: RRR. No murmurs, gallops, or rubs.  Pulm: Lung fields are CTA. No adventitious breath sounds.  GI: +BS, NT/ND  Extremities: AROM and full strength in all extremities.   Vascular: No peripheral edema or calf tenderness. Extremities are WWP.  Incisions: MSI c/d/i without sternal click.    LABS:                        9.1    5.66  )-----------( 477      ( 08 Jan 2025 05:30 )             26.9     PT/INR - ( 08 Jan 2025 05:30 )   PT: 14.7 sec;   INR: 1.26          PTT - ( 08 Jan 2025 05:30 )  PTT:38.8 sec  01-08    135  |  99  |  8   ----------------------------<  107[H]  4.1   |  28  |  0.56    Ca    9.0      08 Jan 2025 05:30  Phos  3.7     01-08  Mg     1.8     01-08      Urinalysis Basic - ( 08 Jan 2025 05:30 )    Color: x / Appearance: x / SG: x / pH: x  Gluc: 107 mg/dL / Ketone: x  / Bili: x / Urobili: x   Blood: x / Protein: x / Nitrite: x   Leuk Esterase: x / RBC: x / WBC x   Sq Epi: x / Non Sq Epi: x / Bacteria: x      MEDICATIONS  (STANDING):  apixaban 2.5 milliGRAM(s) Oral every 12 hours  aspirin enteric coated 81 milliGRAM(s) Oral daily  atorvastatin 40 milliGRAM(s) Oral at bedtime  bisacodyl 5 milliGRAM(s) Oral at bedtime  chlorhexidine 2% Cloths 1 Application(s) Topical daily  glucagon  Injectable 1 milliGRAM(s) IntraMuscular once  latanoprost 0.005% Ophthalmic Solution 1 Drop(s) Both EYES at bedtime  lidocaine   4% Patch 1 Patch Transdermal every 24 hours  metoprolol tartrate 37.5 milliGRAM(s) Oral every 12 hours  pantoprazole    Tablet 40 milliGRAM(s) Oral before breakfast  polyethylene glycol 3350 17 Gram(s) Oral daily  sacubitril 49 mG/valsartan 51 mG 1 Tablet(s) Oral every 12 hours  senna 2 Tablet(s) Oral at bedtime  sodium chloride 0.9% lock flush 3 milliLiter(s) IV Push every 8 hours  sodium chloride 0.9%. 1000 milliLiter(s) (10 mL/Hr) IV Continuous <Continuous>    MEDICATIONS  (PRN):  acetaminophen     Tablet .. 650 milliGRAM(s) Oral every 6 hours PRN Moderate Pain (4 - 6)  melatonin 5 milliGRAM(s) Oral at bedtime PRN Sleep    RADIOLOGY & ADDITIONAL TESTS:

## 2025-01-08 NOTE — PROGRESS NOTE ADULT - REASON FOR ADMISSION
AVR

## 2025-01-08 NOTE — PROGRESS NOTE ADULT - PROVIDER SPECIALTY LIST ADULT
CT Surgery
CT Surgery
Critical Care
CT Surgery
Critical Care
Electrophysiology
BMT/SCT
CT Surgery
CT Surgery
Critical Care
Electrophysiology
Neurology
CT Surgery
Nephrology

## 2025-01-09 ENCOUNTER — TRANSCRIPTION ENCOUNTER (OUTPATIENT)
Age: 60
End: 2025-01-09

## 2025-01-09 VITALS
SYSTOLIC BLOOD PRESSURE: 110 MMHG | DIASTOLIC BLOOD PRESSURE: 62 MMHG | RESPIRATION RATE: 18 BRPM | OXYGEN SATURATION: 98 % | HEART RATE: 84 BPM

## 2025-01-09 LAB
ANION GAP SERPL CALC-SCNC: 10 MMOL/L — SIGNIFICANT CHANGE UP (ref 5–17)
APTT BLD: 42.4 SEC — HIGH (ref 24.5–35.6)
BUN SERPL-MCNC: 7 MG/DL — SIGNIFICANT CHANGE UP (ref 7–23)
CALCIUM SERPL-MCNC: 9.7 MG/DL — SIGNIFICANT CHANGE UP (ref 8.4–10.5)
CHLORIDE SERPL-SCNC: 95 MMOL/L — LOW (ref 96–108)
CO2 SERPL-SCNC: 25 MMOL/L — SIGNIFICANT CHANGE UP (ref 22–31)
CREAT SERPL-MCNC: 0.53 MG/DL — SIGNIFICANT CHANGE UP (ref 0.5–1.3)
EGFR: 106 ML/MIN/1.73M2 — SIGNIFICANT CHANGE UP
GLUCOSE SERPL-MCNC: 89 MG/DL — SIGNIFICANT CHANGE UP (ref 70–99)
HCT VFR BLD CALC: 29.6 % — LOW (ref 34.5–45)
HGB BLD-MCNC: 9.7 G/DL — LOW (ref 11.5–15.5)
INR BLD: 1.24 — HIGH (ref 0.85–1.16)
MAGNESIUM SERPL-MCNC: 1.9 MG/DL — SIGNIFICANT CHANGE UP (ref 1.6–2.6)
MCHC RBC-ENTMCNC: 31.3 PG — SIGNIFICANT CHANGE UP (ref 27–34)
MCHC RBC-ENTMCNC: 32.8 G/DL — SIGNIFICANT CHANGE UP (ref 32–36)
MCV RBC AUTO: 95.5 FL — SIGNIFICANT CHANGE UP (ref 80–100)
NRBC # BLD: 0 /100 WBCS — SIGNIFICANT CHANGE UP (ref 0–0)
PHOSPHATE SERPL-MCNC: 4 MG/DL — SIGNIFICANT CHANGE UP (ref 2.5–4.5)
PLATELET # BLD AUTO: 525 K/UL — HIGH (ref 150–400)
POTASSIUM SERPL-MCNC: 4.2 MMOL/L — SIGNIFICANT CHANGE UP (ref 3.5–5.3)
POTASSIUM SERPL-SCNC: 4.2 MMOL/L — SIGNIFICANT CHANGE UP (ref 3.5–5.3)
PROTHROM AB SERPL-ACNC: 14.2 SEC — HIGH (ref 9.9–13.4)
RBC # BLD: 3.1 M/UL — LOW (ref 3.8–5.2)
RBC # FLD: 14.3 % — SIGNIFICANT CHANGE UP (ref 10.3–14.5)
SODIUM SERPL-SCNC: 130 MMOL/L — LOW (ref 135–145)
WBC # BLD: 5.74 K/UL — SIGNIFICANT CHANGE UP (ref 3.8–10.5)
WBC # FLD AUTO: 5.74 K/UL — SIGNIFICANT CHANGE UP (ref 3.8–10.5)

## 2025-01-09 PROCEDURE — C8929: CPT

## 2025-01-09 PROCEDURE — 93308 TTE F-UP OR LMTD: CPT

## 2025-01-09 PROCEDURE — P9037: CPT

## 2025-01-09 PROCEDURE — 71250 CT THORAX DX C-: CPT | Mod: MC

## 2025-01-09 PROCEDURE — 93880 EXTRACRANIAL BILAT STUDY: CPT

## 2025-01-09 PROCEDURE — 82010 KETONE BODYS QUAN: CPT

## 2025-01-09 PROCEDURE — 97116 GAIT TRAINING THERAPY: CPT

## 2025-01-09 PROCEDURE — 85610 PROTHROMBIN TIME: CPT

## 2025-01-09 PROCEDURE — 83550 IRON BINDING TEST: CPT

## 2025-01-09 PROCEDURE — 70450 CT HEAD/BRAIN W/O DYE: CPT | Mod: MC

## 2025-01-09 PROCEDURE — 86900 BLOOD TYPING SEROLOGIC ABO: CPT

## 2025-01-09 PROCEDURE — C1769: CPT

## 2025-01-09 PROCEDURE — 83880 ASSAY OF NATRIURETIC PEPTIDE: CPT

## 2025-01-09 PROCEDURE — P9059: CPT

## 2025-01-09 PROCEDURE — C1781: CPT

## 2025-01-09 PROCEDURE — 83935 ASSAY OF URINE OSMOLALITY: CPT

## 2025-01-09 PROCEDURE — 80048 BASIC METABOLIC PNL TOTAL CA: CPT

## 2025-01-09 PROCEDURE — 84540 ASSAY OF URINE/UREA-N: CPT

## 2025-01-09 PROCEDURE — 80053 COMPREHEN METABOLIC PANEL: CPT

## 2025-01-09 PROCEDURE — 71046 X-RAY EXAM CHEST 2 VIEWS: CPT

## 2025-01-09 PROCEDURE — 86923 COMPATIBILITY TEST ELECTRIC: CPT

## 2025-01-09 PROCEDURE — 85730 THROMBOPLASTIN TIME PARTIAL: CPT

## 2025-01-09 PROCEDURE — 87640 STAPH A DNA AMP PROBE: CPT

## 2025-01-09 PROCEDURE — 84132 ASSAY OF SERUM POTASSIUM: CPT

## 2025-01-09 PROCEDURE — 36430 TRANSFUSION BLD/BLD COMPNT: CPT

## 2025-01-09 PROCEDURE — C1889: CPT

## 2025-01-09 PROCEDURE — 85027 COMPLETE CBC AUTOMATED: CPT

## 2025-01-09 PROCEDURE — C1768: CPT

## 2025-01-09 PROCEDURE — 87641 MR-STAPH DNA AMP PROBE: CPT

## 2025-01-09 PROCEDURE — 86901 BLOOD TYPING SEROLOGIC RH(D): CPT

## 2025-01-09 PROCEDURE — 94150 VITAL CAPACITY TEST: CPT

## 2025-01-09 PROCEDURE — 70496 CT ANGIOGRAPHY HEAD: CPT | Mod: MC

## 2025-01-09 PROCEDURE — P9012: CPT

## 2025-01-09 PROCEDURE — 82570 ASSAY OF URINE CREATININE: CPT

## 2025-01-09 PROCEDURE — 84295 ASSAY OF SERUM SODIUM: CPT

## 2025-01-09 PROCEDURE — 97110 THERAPEUTIC EXERCISES: CPT

## 2025-01-09 PROCEDURE — 97161 PT EVAL LOW COMPLEX 20 MIN: CPT

## 2025-01-09 PROCEDURE — 74176 CT ABD & PELVIS W/O CONTRAST: CPT | Mod: MC

## 2025-01-09 PROCEDURE — 95708 EEG WO VID EA 12-26HR UNMNTR: CPT

## 2025-01-09 PROCEDURE — 83605 ASSAY OF LACTIC ACID: CPT

## 2025-01-09 PROCEDURE — 84156 ASSAY OF PROTEIN URINE: CPT

## 2025-01-09 PROCEDURE — 84443 ASSAY THYROID STIM HORMONE: CPT

## 2025-01-09 PROCEDURE — C1892: CPT

## 2025-01-09 PROCEDURE — 0042T: CPT | Mod: MC

## 2025-01-09 PROCEDURE — 82330 ASSAY OF CALCIUM: CPT

## 2025-01-09 PROCEDURE — 86850 RBC ANTIBODY SCREEN: CPT

## 2025-01-09 PROCEDURE — 85025 COMPLETE CBC W/AUTO DIFF WBC: CPT

## 2025-01-09 PROCEDURE — C1751: CPT

## 2025-01-09 PROCEDURE — 94640 AIRWAY INHALATION TREATMENT: CPT

## 2025-01-09 PROCEDURE — 84133 ASSAY OF URINE POTASSIUM: CPT

## 2025-01-09 PROCEDURE — 88305 TISSUE EXAM BY PATHOLOGIST: CPT

## 2025-01-09 PROCEDURE — 82150 ASSAY OF AMYLASE: CPT

## 2025-01-09 PROCEDURE — C1785: CPT

## 2025-01-09 PROCEDURE — 86965 POOLING BLOOD PLATELETS: CPT

## 2025-01-09 PROCEDURE — 71045 X-RAY EXAM CHEST 1 VIEW: CPT

## 2025-01-09 PROCEDURE — P9045: CPT

## 2025-01-09 PROCEDURE — 84100 ASSAY OF PHOSPHORUS: CPT

## 2025-01-09 PROCEDURE — 97165 OT EVAL LOW COMPLEX 30 MIN: CPT

## 2025-01-09 PROCEDURE — 86891 AUTOLOGOUS BLOOD OP SALVAGE: CPT

## 2025-01-09 PROCEDURE — 36415 COLL VENOUS BLD VENIPUNCTURE: CPT

## 2025-01-09 PROCEDURE — C1898: CPT

## 2025-01-09 PROCEDURE — 83690 ASSAY OF LIPASE: CPT

## 2025-01-09 PROCEDURE — C1894: CPT

## 2025-01-09 PROCEDURE — 70498 CT ANGIOGRAPHY NECK: CPT | Mod: MC

## 2025-01-09 PROCEDURE — 81001 URINALYSIS AUTO W/SCOPE: CPT

## 2025-01-09 PROCEDURE — P9016: CPT

## 2025-01-09 PROCEDURE — 80076 HEPATIC FUNCTION PANEL: CPT

## 2025-01-09 PROCEDURE — 93005 ELECTROCARDIOGRAM TRACING: CPT

## 2025-01-09 PROCEDURE — 84300 ASSAY OF URINE SODIUM: CPT

## 2025-01-09 PROCEDURE — 81003 URINALYSIS AUTO W/O SCOPE: CPT

## 2025-01-09 PROCEDURE — 83540 ASSAY OF IRON: CPT

## 2025-01-09 PROCEDURE — 80061 LIPID PANEL: CPT

## 2025-01-09 PROCEDURE — 97535 SELF CARE MNGMENT TRAINING: CPT

## 2025-01-09 PROCEDURE — 82962 GLUCOSE BLOOD TEST: CPT

## 2025-01-09 PROCEDURE — 83735 ASSAY OF MAGNESIUM: CPT

## 2025-01-09 PROCEDURE — 83036 HEMOGLOBIN GLYCOSYLATED A1C: CPT

## 2025-01-09 PROCEDURE — 82803 BLOOD GASES ANY COMBINATION: CPT

## 2025-01-09 PROCEDURE — 95700 EEG CONT REC W/VID EEG TECH: CPT

## 2025-01-09 PROCEDURE — P9100: CPT

## 2025-01-09 RX ORDER — GINKGO BILOBA 40 MG
1 CAPSULE ORAL
Qty: 0 | Refills: 0 | DISCHARGE
Start: 2025-01-09

## 2025-01-09 RX ORDER — ACETAMINOPHEN 80 MG/.8ML
2 SOLUTION/ DROPS ORAL
Qty: 112 | Refills: 0
Start: 2025-01-09 | End: 2025-01-22

## 2025-01-09 RX ORDER — FUROSEMIDE 20 MG
1 TABLET ORAL
Qty: 5 | Refills: 0
Start: 2025-01-09 | End: 2025-01-13

## 2025-01-09 RX ORDER — FUROSEMIDE 20 MG
1 TABLET ORAL
Refills: 0 | DISCHARGE

## 2025-01-09 RX ORDER — SACUBITRIL AND VALSARTAN 24; 26 MG/1; MG/1
1 TABLET, FILM COATED ORAL
Qty: 60 | Refills: 0
Start: 2025-01-09 | End: 2025-02-07

## 2025-01-09 RX ORDER — APIXABAN 5 MG/1
1 TABLET, FILM COATED ORAL
Refills: 0 | DISCHARGE

## 2025-01-09 RX ORDER — METOPROLOL TARTRATE 50 MG
1 TABLET ORAL
Qty: 60 | Refills: 0
Start: 2025-01-09 | End: 2025-02-07

## 2025-01-09 RX ORDER — ASPIRIN 81 MG
1 TABLET, DELAYED RELEASE (ENTERIC COATED) ORAL
Qty: 30 | Refills: 0
Start: 2025-01-09 | End: 2025-02-07

## 2025-01-09 RX ORDER — CARVEDILOL 25 MG/1
1 TABLET, FILM COATED ORAL
Refills: 0 | DISCHARGE

## 2025-01-09 RX ORDER — ATORVASTATIN CALCIUM 40 MG/1
1 TABLET, FILM COATED ORAL
Qty: 30 | Refills: 0
Start: 2025-01-09 | End: 2025-02-07

## 2025-01-09 RX ORDER — APIXABAN 5 MG/1
1 TABLET, FILM COATED ORAL
Qty: 60 | Refills: 0
Start: 2025-01-09 | End: 2025-02-07

## 2025-01-09 RX ORDER — SACUBITRIL AND VALSARTAN 24; 26 MG/1; MG/1
1 TABLET, FILM COATED ORAL
Refills: 0 | DISCHARGE

## 2025-01-09 RX ORDER — POLYETHYLENE GLYCOL 3350 17 G/DOSE
17 POWDER (GRAM) ORAL
Qty: 119 | Refills: 0
Start: 2025-01-09 | End: 2025-01-15

## 2025-01-09 RX ADMIN — SODIUM CHLORIDE 3 MILLILITER(S): 9 INJECTION, SOLUTION INTRAMUSCULAR; INTRAVENOUS; SUBCUTANEOUS at 05:28

## 2025-01-09 RX ADMIN — ACETAMINOPHEN 650 MILLIGRAM(S): 80 SOLUTION/ DROPS ORAL at 11:00

## 2025-01-09 RX ADMIN — Medication 17 GRAM(S): at 11:19

## 2025-01-09 RX ADMIN — Medication 37.5 MILLIGRAM(S): at 05:30

## 2025-01-09 RX ADMIN — PANTOPRAZOLE 40 MILLIGRAM(S): 40 TABLET, DELAYED RELEASE ORAL at 05:30

## 2025-01-09 RX ADMIN — SACUBITRIL AND VALSARTAN 1 TABLET(S): 24; 26 TABLET, FILM COATED ORAL at 10:38

## 2025-01-09 RX ADMIN — APIXABAN 2.5 MILLIGRAM(S): 5 TABLET, FILM COATED ORAL at 05:30

## 2025-01-09 RX ADMIN — Medication 81 MILLIGRAM(S): at 11:19

## 2025-01-09 RX ADMIN — ACETAMINOPHEN 650 MILLIGRAM(S): 80 SOLUTION/ DROPS ORAL at 10:35

## 2025-01-09 RX ADMIN — CHLORHEXIDINE GLUCONATE 1 APPLICATION(S): 1.2 RINSE ORAL at 05:28

## 2025-01-09 RX ADMIN — SODIUM CHLORIDE 3 MILLILITER(S): 9 INJECTION, SOLUTION INTRAMUSCULAR; INTRAVENOUS; SUBCUTANEOUS at 14:19

## 2025-01-09 NOTE — DISCHARGE NOTE PROVIDER - HOSPITAL COURSE
Patient discussed on morning rounds with Dr. Logan   Operation Date: 24- AVR , ascending aorta replacement, cryomaze, JIM appendage closure, EF normal  Primary Surgeon/Attending MD: Dr. Gray   Referring Physician:  Dr. Arias Bobby   _ _ _ _ _ _ _ _ _ _ _ _   HOSPITAL COURSE:   59 y/o Kazakh-speaking F with PMHx of HTN, HLD, Anemia, NSTEMI  (no PCI), ?TIA in  (no deficits), pAfib (on Eliquis), HFpEF (EF 55-60%), gallstones who presented to [facility] for pre-op optimization. On 24 patient underwent AVR and ascending aorta replacement, cryo maze and JIM appendage closure (35mm). Post operatively brought to CTICU in stable condition. Dobutamine and primacor added. POD1 Stroke code called for lethargy and L sided weakness w/u negative. EEG placed for noted arm jerking- negative. POD2 Extubated to HFNC for positive pressure. POD3 Remained on low dose  and primacor. POD4 dobutamine weaned off, swan removed. Meds x 2 removed. Weaned from HFNC to NC. POD5 Primacor weaned off. POD6 diuresed. BB started. Noted to have conversion pause so pacing wires kept in. Pigtail placed for R pleural effusion. POD7 pigtail and pacing wires removed. Transferred to [facility]. POD 8 Increased entresto dose. Post-op TTE done with normal function of bioprosthetic valve. Statin re-started. POD 9 restarted 1/2 dose eliquis per Dr. Arizmendi/Doreen given extensive aortic surgery. Early on POD13 pt with 6 second conversion pause. EP consulted. Eliquis held 1 day. POD14 PPM placed. POD15 pending discharge to rehab. Per EP 2.5 eliquis restarted. BB restarted. Plan for ablation/DCCV when cleared to tolerate full dose AC (eliquis 5 BID). Patient is ready for discharge to home pending conversation with son after extensive discussion of home vs rehab.   _ _ _ _ _ _ _ _ _ _ _ _   PHYSICAL EXAM:  General: well appearing sitting up in chair in AND   Neurological: AOx3. Motor skills grossly intact  Cardiovascular: Normal S1/S2. Regular rate/rhythm. No murmurs  Respiratory: Lungs CTA bilaterally. No wheezing or rales  Gastrointestinal: +BS in all 4 quadrants. Non-distended. Soft. Non-tender  Extremities: Strength 5/5 b/l upper/lower extremities. Sensation grossly intact upper/lower extremities. No edema. No calf tenderness.  Vascular: Radial 2+bilaterally, DP 2+ b/l  Incision Sites: sternotomy healing well no erythema, purulence or ecchymosis.   _ _ _ _ _ _ _ _ _ _ _ _   REMOVAL CHECKLIST:         [ x] Epicardial wires         [ x] Stitches/tie downs,   If no, why?    _ _ _ _ _ _ _ _ _ _ _ _   MEDICATION DISCHARGE CHECKLIST      Surgical Valve         [ x] Aspirin, [  ] Contraindicated, Reason:         [ x] Lasix, [  ] Contraindicated, Reason:  OF NOTE HOLDING POTASSIUM, pt with consistently normal potassium levels on hospital stay despite lasix             Duration:          [ x] Beta-Blocker, [  ] Contraindicated, Reason:         Anticoagulation         [ x] NOAC – Name, [ ] Reason:               Cost/Insurance barriers addressed: YES - pt already takes this medication at home   _ _ _ _ _ _ _ _ _ _ _ _   RELEVANT LABS/IMAGING:   None   _ _ _ _ _ _ _ _ _ _ _ _   CLINICAL FOLLOW UP NEEDS:   None  _ _ _ _ _ _ _ _ _ _ _ _   Over 35 minutes was spent with the patient reviewing the discharge material including medications, follow up appointments, recovery, concerning symptoms, and how to contact their health care providers if they have questions   Patient discussed on morning rounds with Dr. Logan   Operation Date: 24- AVR , ascending aorta replacement, cryomaze, JIM appendage closure, EF normal  Primary Surgeon/Attending MD: Dr. Gray   Referring Physician:  Dr. Arias Bobby   _ _ _ _ _ _ _ _ _ _ _ _   HOSPITAL COURSE:   59 y/o Ukrainian-speaking F with PMHx of HTN, HLD, Anemia, NSTEMI  (no PCI), ?TIA in  (no deficits), pAfib (on Eliquis), HFpEF (EF 55-60%), gallstones who presented to [facility] for pre-op optimization. On 24 patient underwent AVR and ascending aorta replacement, cryo maze and JIM appendage closure (35mm). Post operatively brought to CTICU in stable condition. Dobutamine and primacor added. POD1 Stroke code called for lethargy and L sided weakness w/u negative. EEG placed for noted arm jerking- negative. POD2 Extubated to HFNC for positive pressure. POD3 Remained on low dose  and primacor. POD4 dobutamine weaned off, swan removed. Meds x 2 removed. Weaned from HFNC to NC. POD5 Primacor weaned off. POD6 diuresed. BB started. Noted to have conversion pause so pacing wires kept in. Pigtail placed for R pleural effusion. POD7 pigtail and pacing wires removed. Transferred to [facility]. POD 8 Increased entresto dose. Post-op TTE done with normal function of bioprosthetic valve. Statin re-started. POD 9 restarted 1/2 dose eliquis per Dr. Arizmendi/Doreen given extensive aortic surgery. Early on POD13 pt with 6 second conversion pause. EP consulted. Eliquis held 1 day. POD14 PPM placed. POD15 pending discharge to rehab. Per EP 2.5 eliquis restarted. BB restarted. Plan for ablation/DCCV when cleared to tolerate full dose AC (eliquis 5 BID). Patient is ready for discharge to home pending conversation with son after extensive discussion of home vs rehab.   _ _ _ _ _ _ _ _ _ _ _ _   PHYSICAL EXAM:  General: well appearing sitting up in chair in AND   Neurological: AOx3. Motor skills grossly intact  Cardiovascular: Normal S1/S2. Regular rate/rhythm. No murmurs  Respiratory: Lungs CTA bilaterally. No wheezing or rales  Gastrointestinal: +BS in all 4 quadrants. Non-distended. Soft. Non-tender  Extremities: Strength 5/5 b/l upper/lower extremities. Sensation grossly intact upper/lower extremities. No edema. No calf tenderness.  Vascular: Radial 2+bilaterally, DP 2+ b/l  Incision Sites: sternotomy healing well no erythema, purulence or ecchymosis.     DC NIHSS:  _ _ _ _ _ _ _ _ _ _ _ _   REMOVAL CHECKLIST:         [ x] Epicardial wires         [ x] Stitches/tie downs,   If no, why?    _ _ _ _ _ _ _ _ _ _ _ _   MEDICATION DISCHARGE CHECKLIST      Surgical Valve         [ x] Aspirin, [  ] Contraindicated, Reason:         [ x] Lasix, [  ] Contraindicated, Reason:  OF NOTE HOLDING POTASSIUM, pt with consistently normal potassium levels on hospital stay despite lasix             Duration:          [ x] Beta-Blocker, [  ] Contraindicated, Reason:         Anticoagulation         [ x] NOAC – Name, [ ] Reason:               Cost/Insurance barriers addressed: YES - pt already takes this medication at home   _ _ _ _ _ _ _ _ _ _ _ _   RELEVANT LABS/IMAGING:   None   _ _ _ _ _ _ _ _ _ _ _ _   CLINICAL FOLLOW UP NEEDS:   None  _ _ _ _ _ _ _ _ _ _ _ _   Over 35 minutes was spent with the patient reviewing the discharge material including medications, follow up appointments, recovery, concerning symptoms, and how to contact their health care providers if they have questions   Patient discussed on morning rounds with Dr. Logan   Operation Date: 24- AVR , ascending aorta replacement, cryomaze, JIM appendage closure, EF normal  Primary Surgeon/Attending MD: Dr. Gray   Referring Physician:  Dr. Arias Bobby   _ _ _ _ _ _ _ _ _ _ _ _   HOSPITAL COURSE:   59 y/o Welsh-speaking F with PMHx of HTN, HLD, Anemia, NSTEMI  (no PCI), ?TIA in  (no deficits), pAfib (on Eliquis), HFpEF (EF 55-60%), gallstones who presented to [facility] for pre-op optimization. On 24 patient underwent AVR and ascending aorta replacement, cryo maze and JIM appendage closure (35mm). Post operatively brought to CTICU in stable condition. Dobutamine and primacor added. POD1 Stroke code called for lethargy and L sided weakness w/u negative. EEG placed for noted arm jerking- negative. POD2 Extubated to HFNC for positive pressure. POD3 Remained on low dose  and primacor. POD4 dobutamine weaned off, swan removed. Meds x 2 removed. Weaned from HFNC to NC. POD5 Primacor weaned off. POD6 diuresed. BB started. Noted to have conversion pause so pacing wires kept in. Pigtail placed for R pleural effusion. POD7 pigtail and pacing wires removed. Transferred to [facility]. POD 8 Increased entresto dose. Post-op TTE done with normal function of bioprosthetic valve. Statin re-started. POD 9 restarted 1/2 dose eliquis per Dr. Arizmendi/Doreen given extensive aortic surgery. Early on POD13 pt with 6 second conversion pause. EP consulted. Eliquis held 1 day. POD14 PPM placed. POD15 pending discharge to rehab. Per EP 2.5 eliquis restarted. BB restarted. Plan for ablation/DCCV when cleared to tolerate full dose AC (eliquis 5 BID). Patient is ready for discharge to home pending conversation with son after extensive discussion of home vs rehab.   _ _ _ _ _ _ _ _ _ _ _ _   PHYSICAL EXAM:  General: well appearing sitting up in chair in AND   Neurological: AOx3. Motor skills grossly intact  Cardiovascular: Normal S1/S2. Regular rate/rhythm. No murmurs  Respiratory: Lungs CTA bilaterally. No wheezing or rales  Gastrointestinal: +BS in all 4 quadrants. Non-distended. Soft. Non-tender  Extremities: Strength 5/5 b/l upper/lower extremities. Sensation grossly intact upper/lower extremities. No edema. No calf tenderness.  Vascular: Radial 2+bilaterally, DP 2+ b/l  Incision Sites: sternotomy healing well no erythema, purulence or ecchymosis.     DC NIHSS: 0  _ _ _ _ _ _ _ _ _ _ _ _   REMOVAL CHECKLIST:         [ x] Epicardial wires         [ x] Stitches/tie downs,   If no, why?    _ _ _ _ _ _ _ _ _ _ _ _   MEDICATION DISCHARGE CHECKLIST      Surgical Valve         [ x] Aspirin, [  ] Contraindicated, Reason:         [ x] Lasix, [  ] Contraindicated, Reason:  OF NOTE HOLDING POTASSIUM, pt with consistently normal potassium levels on hospital stay despite lasix             Duration:          [ x] Beta-Blocker, [  ] Contraindicated, Reason:         Anticoagulation         [ x] NOAC – Name, [ ] Reason:               Cost/Insurance barriers addressed: YES - pt already takes this medication at home   _ _ _ _ _ _ _ _ _ _ _ _   RELEVANT LABS/IMAGING:   None   _ _ _ _ _ _ _ _ _ _ _ _   CLINICAL FOLLOW UP NEEDS:   None  _ _ _ _ _ _ _ _ _ _ _ _   Over 35 minutes was spent with the patient reviewing the discharge material including medications, follow up appointments, recovery, concerning symptoms, and how to contact their health care providers if they have questions

## 2025-01-09 NOTE — DISCHARGE NOTE NURSING/CASE MANAGEMENT/SOCIAL WORK - FINANCIAL ASSISTANCE
[facility] provides services at a reduced cost to those who are determined to be eligible through [facility]’s financial assistance program. Information regarding [facility]’s financial assistance program can be found by going to [email or web address] or by calling [phone].

## 2025-01-09 NOTE — DISCHARGE NOTE NURSING/CASE MANAGEMENT/SOCIAL WORK - NSDCFUADDAPPT_GEN_ALL_CORE_FT
-Please make sure you follow up with EP (electrophysiology) within 3-4 weeks from discharge from the hospital.   -Please attend the remainder of your folloe up appointments

## 2025-01-09 NOTE — DISCHARGE NOTE PROVIDER - NSDCFUADDINST_GEN_ALL_CORE_FT
TELE RN OPENING NOTES:



RECEIVED PT IN BED AWAKE, ALERT AND ORIENTED X1-2, NO SOB OR CARDIAC DISTRESS NOTED, ON TELE 
MONITOR WITH CURRENT READING A FIB @75BPM. NOTED WITH CONDOM CATH PATENT AND INTACT DRAINING 
CLEAR YELLOW COLORED URINE VIA GRAVITY. IV ACCESS ON LEFT HAND GAUGE 20 PATENT, INTACT AND 
INFUSING NS 1L @ 125ML/HR. SAFETY MEASURES MAINTAINED: BED LOCKED AND IN LOWEST POSITION, 
SIDE RAILS UP X 2 . CALL LIGHT IN EASY REACH FOR HELP. WILL MONITOR PT ACCORDINGLY. -Walk daily as tolerated and use your incentive spirometer 10 times every hour while you are awake.     -Please weigh yourself daily. If you notice over a 3 pound weight gain in 3 days, this is a sign you are likely retaining too much fluid. It is imperative you call our right away with unexplained rapid weight gain.      -Please continue to wear the compression stockings given to you in the hospital at home. This is a way to prevent fluid from building up in your legs.     -No driving or strenuous activity/exercise until cleared by your surgeon.    -Gently clean your incisions with unscented/antibacterial soap and water, pat dry.  You may leave them open to air.    -Call your doctor if you have shortness of breath, chest pain not relieved by pain medication, dizziness, fever >100.5, or increased redness or drainage from incisions.   -You had a MCOT monitor (an external cardiac rhythm monitoring device) placed on your day of discharge.  This helps us monitor your heart while you are out of the hospital for 30 days after discharge. Should your heart go into an abnormal or dangerous rhythm you will receieve a call from the MCOT team and your Structural Heart team of Doctors and PA's will be notified.    1. Keep the monitor within 30 feet of you at all times.  2. When you feel any symptom (chest pain, dizziness, palpitations, weakness, fatigue or anything outside of your normal), press the “Record Symptoms” button on the main phone of your phone  3. Shower or exercise as normal whilewearing the MCOT Patch. Do not swim or take a bath. Patch is water-resistant, not waterproof  4. When the battery is low on the phone or on the device, use the supplied . The monitor will show a warning message when the battery is low.  5. Do not remove the patch from yourskin after you begin monitoring. With normal wear, each patch should last 5 days. To replace the patch follow instructions in the MCOT box with the Patch Guide  6. Any issues with the MCOT device or phone please call Customer Service at [phone].  7. If you have any other questions at all please call the Structural Heart office at [phone]      -Walk daily as tolerated and use your incentive spirometer 10 times every hour while you are awake.     -Please weigh yourself daily. If you notice over a 3 pound weight gain in 3 days, this is a sign you are likely retaining too much fluid. It is imperative you call our right away with unexplained rapid weight gain.      -Please continue to wear the compression stockings given to you in the hospital at home. This is a way to prevent fluid from building up in your legs.     -No driving or strenuous activity/exercise until cleared by your surgeon.    -Gently clean your incisions with unscented/antibacterial soap and water, pat dry.  You may leave them open to air.    -Call your doctor if you have shortness of breath, chest pain not relieved by pain medication, dizziness, fever >100.5, or increased redness or drainage from incisions.   -1. Keep the monitor within 30 feet of you at all times.  2. When you feel any symptom (chest pain, dizziness, palpitations, weakness, fatigue or anything outside of your normal), press the “Record Symptoms” button on the main phone of your phone  3. Shower or exercise as normal whilewearing the MCOT Patch. Do not swim or take a bath. Patch is water-resistant, not waterproof  4. When the battery is low on the phone or on the device, use the supplied . The monitor will show a warning message when the battery is low.  5. Do not remove the patch from yourskin after you begin monitoring. With normal wear, each patch should last 5 days. To replace the patch follow instructions in the MCOT box with the Patch Guide  6. Any issues with the MCOT device or phone please call Customer Service at [phone].  7. If you have any other questions at all please call the Structural Heart office at [phone]      -Walk daily as tolerated and use your incentive spirometer 10 times every hour while you are awake.     -Please weigh yourself daily. If you notice over a 3 pound weight gain in 3 days, this is a sign you are likely retaining too much fluid. It is imperative you call our right away with unexplained rapid weight gain.      -Please continue to wear the compression stockings given to you in the hospital at home. This is a way to prevent fluid from building up in your legs.     -No driving or strenuous activity/exercise until cleared by your surgeon.    -Gently clean your incisions with unscented/antibacterial soap and water, pat dry.  You may leave them open to air.    -Call your doctor if you have shortness of breath, chest pain not relieved by pain medication, dizziness, fever >100.5, or increased redness or drainage from incisions.

## 2025-01-09 NOTE — DISCHARGE NOTE PROVIDER - NSDCQMERRANDS_GEN_ALL_CORE
MEDICAL ELIGIBILITY FORM    Name: Neto Bradley YOB: 2007     Neto is Medically eligible for all sports without restriction    Recommendations: N/A    I have examined the student named on this form and completed the preparticipation physical evaluation. The athlete does not have apparent clinical contraindications to practice and can participate in the sport(s) as outlined on this form. A copy of the physical examination findings are on record in my office and can be made available to the school at the request of the parents. If conditions arise after the athlete has been cleared for participation, the physician may rescind the medical eligibility until the problem is resolved and the potential consequences are completely explained to the athlete (and parents or guardians).    Name of health care professional (print or type): Rehan Abarca MD Date: 10/19/2022     Address: [address] [address]  Dept: [phone]     Signature of health care professional: _________    ______________________________      SHARED EMERGENCY INFORMATION    No Known Allergies    Current Outpatient Medications   Medication Instructions   • benzoyl peroxide 5 % gel Topical, DAILY   • fluticasone (FLONASE) 50 MCG/ACT nasal spray 1-2 sprays, Nasal, DAILY   • olopatadine (PATADAY) 0.2 % ophthalmic solution 1 drop, Both Eyes, DAILY, For 5 days then as needed   • Vitamin D (Ergocalciferol) 1.25 mg, Oral, 1 DAY A WEEK       Other information:_____________________________________________________________________  _____________________________________________________________________________________  _____________________________________________________________________________________    Emergency  contacts:___________________________________________________________________  _____________________________________________________________________________________  _____________________________________________________________________________________     © 2019 [place] [employer], American Academy of Pediatrics, American College of Sport Medicine, American Medical Society for Sports Medicine, American Orthopaedics Society for Sport Medicine, and American Osteopathic Academy of Sports Medicine.  Permission is granted to reprint for noncommercial, educational purposes with acknowledgement.      PHYSICAL EXAMINATION FORM     Name: Neto Bradley YOB: 2007   PHYSICIAN REMINDERS  1. Consider additional questions on more-sensitive issues.  · Do you feel stressed out or under a lot of pressure?  · Do you feel sad, hopeless, depressed or anxious?  · Do you feel safe at your home or residence?  · During the past 30 days, did you use chewing tobacco, snuff or dip?  · Do you drink alcohol or user any other drugs?  · Have you even taken anabolic steroids or used any other performance-enhancing supplement?  · Have you even take any supplements to help you gain or lose weight or improve your performance?  · Do you wear a seat belt, use a helmet, and use condoms?  2.  Consider reviewing questions on cardiovascular symptoms (Q4-Q13 of History Form).    EXAMINATION     Vitals: /73   Pulse 61   Temp 98.4 °F (36.9 °C) (Temporal)   Ht 6' (1.829 m)   Wt (!) 134.9 kg (297 lb 6.4 oz)   BMI 40.33 kg/m²   BSA 2.52 m²    Vision: R 20/               L 20/                      Corrected  Y         N     MEDICAL NORMAL ABNORMAL FINDINGS   Appearance  · Marfan stigmata (kyphoscoliosis, high-arched palate, pectus excavatum, arachnodactyly, arm span > height, hyperlaxity, myopia, MVP, aortic insufficiency) Yes    Eyes, ears, nose, throat  · Pupils equal  · Hearing Yes    Lymph nodes Yes     Heart*  · Murmurs (auscultation standing, auscultation supine, +/- Valsalva maneuver) Yes    Lungs Yes    Abdomen Yes    Skin  · Herpes simplex virus (HSV), lesions suggestive of methicillin-resistant Staphylococcus aureus MRSA, or tinea corporis Yes    MUSCULOSKELETAL NORMAL ABNORMAL FINDINGS   Neck Yes    Back Yes    Shoulder and arm Yes    Elbow and forearm Yes    Wrist, hand, and fingers Yes    Hip and thigh Yes    Knee Yes    Leg and ankle Yes    Foot and toes Yes    Functional  · Double-leg squat test, single-leg squat test, and box drop or step drop test Yes    * Consider electrocardiography ECG, echocardiogram, referral to cardiology for abnormal cardiac history or examination finding, or a combination of those.  Name of health care professional (print or type): Rehan Abarca MD  Date: 10/19/2022  Address: [address] [address]  Dept: [phone]   Signature of health care professional: ______________    _________________________    © 2019 American Academy of Family Physicians, American Academy of Pediatrics, American College of Sport Medicine, American Medical Society for Sports Medicine, American Orthopaedics Society for Sport Medicine, and American Osteopathic Academy of Sports Medicine.  Permission is granted to reprint for noncommercial, educational purposes with acknowledgement.         Name: Neto Bradley YOB: 2007   Supplemental COVID-19 questions     1.  Have you had any of the following symptoms in the past 14 days?           a) Fever or chills Yes  /  No          b) Cough Yes  /  No          c) Shortness of breath or difficulty breathing Yes  /  No          d) Fatigue Yes  /  No          e) Muscle or body aches Yes  /  No          f) Headache Yes  /  No          g) New loss of taste or smell Yes  /  No          h) Sore throat Yes  /  No          i) Congestion or runny nose Yes  /  No           j) Nausea or vomiting Yes  /  No          k) Diarrhea Yes  /  No          l) Date symptoms started _______          m) Date symptoms resolved _______   2.  Have you ever had a positive test for COVID-19? Yes  /  No          If yes _______                 i.  Date of test Yes  /  No                 ii. Were you tested because you had symptoms? Yes  /  No                 If yes:                         a) Date symptoms started _______                        b) Date symptoms resolved _______                        c) Were you hospitalized? Yes  /  No                        d) Did you have fever > 100.4 F.? Yes  /  No                               If yes, how many days did your fever last? _______                        e) Did you have muscle aches, chills, or lethargy? Yes  /  No                               If yes, how many days did these symptoms last? _______                        f) Have you had the vaccine? Yes  /  No                 iii. Were you tested because you were exposed to someone with COVID-19, but you did not have                     any symptoms? Yes  /  No   3. Has anyone living in your household had any of the following symptoms or tested positive for COVID-19 in the past 14 days? Yes  /  No          If Yes, [place] the applicable symptoms.     • Fever or chills • Shortness of breath or difficulty breathing    • Muscle or body aches • New loss of taste or smell    • Nausea or vomiting • Congestion or runny nose    • Sore throat           • Headache           • Cough • Fatigue           • Diarrhea       4. Have you been within 6 feet for more than 15 minutes of someone with COVID-19 in the past 14 days? Yes  /  No          If yes: date(s) of exposure _______   5. Are you currently waiting on results from a recent COVID test? Yes  /  No     Sources:  • Interim Guidance on the Preparticipation Physical Examination... : Clinical Journal of Sport Medicine (lww.com)  • Supplemental COVID19 Questions  (lww.com)  • COVID19 Interim Guidance: Return to Sports and Physical Activity (aap.org)      HISTORY FORM  Note: Complete and sign this form (with your parents if younger than 18) before your appointment.  Name: Neto Bradley YOB: 2007     Date of examination: 10/19/2022  Sport(s):_________________________________________   Sex assigned at birth: (F, M, or other): male How do you identify your gender?(F, M, or other):_________     List past and current medical conditions:____________________________________________________________________  _______________________________________________________________________________________________________________    Have you ever had surgery? If yes, list all past surgical procedures: ______________________________________________  _______________________________________________________________________________________________________________    Medicines and supplements: List all current prescriptions, over-the-counter medicines, and supplements (herbal and nutritional):   ______________________________________________________________________________________________________________  ______________________________________________________________________________________________________________    Do you have any allergies? If yes, please list all your allergies (ie, medicines, pollens, food, stinging insects).   ______________________________________________________________________________________________________________  ______________________________________________________________________________________________________________       Patient Health Questionnaire Version 4 (PHQ-4)  Over the last 2 weeks, how often have you been bothered by any of the following problems? ([place] response.)   Not at all Several days Over half the days Nearly every day   Feeling nervous, anxious, or on edge 0 1 2 3   Not being able to stop or control worrying 0 1 2 3   Little  interest or pleasure in doing things 0 1 2 3   Feeling down, depressed, or hopeless 0 1 2 3   (A sum of ?3 is considered positive on either subscale [questions 1 and 2, or questions 3 and 4] for screening purposes.)     GENERAL QUESTIONS  (Explain “Yes” answers at the end of this form.  [place] questions if you don’t know the answer.)     Yes     No   1. Do you have any concerns that you would like to discuss with your provider?       2. Has a provider ever denied or restricted your participation in sports for any reason?       3. Do you have any ongoing medical issues or recent illness?       HEART HEALTH QUESTIONS ABOUT YOU Yes No   4. Have you ever passed out or nearly passed out during or after exercise?       5. Have you ever had discomfort, pain, tightness, or pressure in your chest during exercise?       6. Does your heart ever race, flutter in your chest, or skip beats (irregular beats) during exercise?       7. Has a doctor ever told you that you have any heart problems?       8. Has a doctor ever requested a test for your heart? For example, electrocardiography (ECG) or echocardiography.      HEART HEALTH QUESTIONS ABOUT YOU (CONTINUED ) Yes No   9. Do you get light-headed or feel shorter of breath than your friends during exercise?       10. Have you ever had a seizure?       HEART HEALTH QUESTIONS ABOUT YOUR FAMILY Yes No   11. Has any family member or relative  of heart problems or had an unexpected or unexplained sudden death before age 35 years (including drowning or unexplained car crash)?       12. Does anyone in your family have a genetic heart problem such as hypertrophic cardiomyopathy (HCM), Marfan syndrome, arrhythmogenic right ventricular cardiomyopathy (ARVC), long QT syndrome (LQTS), short QT syndrome (SQTS), Brugada syndrome, or catecholaminergic polymorphic ventricular tachycardia (CPVT)?       13. Has anyone in your family had a pacemaker or an implanted defibrillator before age 35?                 Name: Neto Bradley YOB: 2007     BONE AND JOINT QUESTIONS Yes No   14. Have you ever had a stress fracture or an injury to a bone, muscle, ligament, joint, or tendon that caused you to miss a practice or game?       15. Do you have a bone, muscle, ligament, or joint injury that bothers you?       MEDICAL QUESTIONS Yes No   16. Do you cough, wheeze, or have difficulty breathing during or after exercise?       17. Are you missing a kidney, an eye, a testicle (males), your spleen, or any other organ?       18. Do you have groin or testicle pain or a painful bulge or hernia in the groin area?       19. Do you have any recurring skin rashes or rashes that come and go, including herpes or methicillin-resistant Staphylococcus aureus  (MRSA)?       20. Have you had a concussion or head injury that caused confusion, a prolonged headache, or memory problems?       21. Have you ever had numbness, had tingling, had weakness in your arms or legs, or been unable to move your arms or legs after being hit or falling?       22. Have you ever become ill while exercising in the heat?       23. Do you or does someone in your family have sickle cell trait or disease?       24. Have you ever had or do you have any problems with your eyes or vision?        MEDICAL QUESTIONS (CONTINUED ) Yes No   25. Do you worry about your weight?         26. Are you trying to or has anyone recommended that you gain or lose weight?       27. Are you on a special diet or do you avoid certain types of foods or food groups?       28. Have you ever had an eating disorder?       FEMALES ONLY     29. Have you ever had a menstrual period?       30. How old were you when you had your first menstrual period?       31. When was your most recent menstrual period?       32. How many periods have you had in the past 12 months?         Explain \"Yes\" answers  here.  ______________________________________________    ______________________________________________    ______________________________________________    ______________________________________________    ______________________________________________    ______________________________________________    ______________________________________________    ______________________________________________     I hereby state that, to the best of my knowledge, my answers to the questions on this form are complete and correct.    Signature of athlete: ____________________________________________________________________________________    Signature of parent or guardian: ___________________________________________________________________________  Date: ________________________________________________  _____________________________________________________________________________________________________  © 2019 American Academy of Family Physicians, American Academy of Pediatrics, American College of Sports Medicine, American Medical Society for Sports Medicine, American Orthopaedic Society for Sports Medicine, and American Osteopathic Academy of Sports Medicine. Permission is granted to reprint for noncommercial, educational purposes with acknowledgment.   No

## 2025-01-09 NOTE — DISCHARGE NOTE PROVIDER - NSDCMRMEDTOKEN_GEN_ALL_CORE_FT
acetaminophen 325 mg oral tablet: 2 tab(s) orally every 6 hours as needed for Moderate Pain (4 - 6)  apixaban 2.5 mg oral tablet: 1 tab(s) orally every 12 hours  aspirin 81 mg oral delayed release tablet: 1 tab(s) orally once a day  atorvastatin 40 mg oral tablet: 1 tab(s) orally once a day (at bedtime)  Lasix 20 mg oral tablet: 1 tab(s) orally once a day  latanoprost 0.005% ophthalmic solution: 1 drop(s) in each eye once a day  melatonin 5 mg oral tablet: 1 tab(s) orally once a day (at bedtime) As needed Sleep  metoprolol tartrate 37.5 mg oral tablet: 1 tab(s) orally every 12 hours  pantoprazole 40 mg oral delayed release tablet: 1 tab(s) orally once a day  polyethylene glycol 3350 oral powder for reconstitution: 17 gram(s) orally once a day as needed for  constipation  sacubitril-valsartan 49 mg-51 mg oral tablet: 1 tab(s) orally every 12 hours

## 2025-01-09 NOTE — DISCHARGE NOTE PROVIDER - NSDCCPTREATMENT_GEN_ALL_CORE_FT
PRINCIPAL PROCEDURE  Procedure: Replacement, aortic valve and ascending aorta  Findings and Treatment:   23mm bio   26mm graft

## 2025-01-09 NOTE — DISCHARGE NOTE PROVIDER - NSDCCPCAREPLAN_GEN_ALL_CORE_FT
PRINCIPAL DISCHARGE DIAGNOSIS  Diagnosis: Aortic regurgitation  Assessment and Plan of Treatment:

## 2025-01-09 NOTE — DISCHARGE NOTE PROVIDER - CARE PROVIDERS DIRECT ADDRESSES
,[email or web address] ,[email or web address],DirectAddress_Unknown

## 2025-01-09 NOTE — DISCHARGE NOTE PROVIDER - PROVIDER TOKENS
PROVIDER:[TOKEN:[[id number]],SCHEDULEDAPPT:[01/15/2025],SCHEDULEDAPPTTIME:[12:15 PM]],PROVIDER:[TOKEN:[[id number]]],PROVIDER:[TOKEN:[[id number]],SCHEDULEDAPPT:[01/14/2025],SCHEDULEDAPPTTIME:[10:00 AM]],PROVIDER:[TOKEN:[[id number]]] PROVIDER:[TOKEN:[[id number]],SCHEDULEDAPPT:[01/15/2025],SCHEDULEDAPPTTIME:[12:15 PM]],PROVIDER:[TOKEN:[[id number]]],PROVIDER:[TOKEN:[[id number]],SCHEDULEDAPPT:[01/14/2025],SCHEDULEDAPPTTIME:[10:00 AM]],PROVIDER:[TOKEN:[[id number]],SCHEDULEDAPPT:[01/30/2025],SCHEDULEDAPPTTIME:[11:00 AM]]

## 2025-01-09 NOTE — DISCHARGE NOTE NURSING/CASE MANAGEMENT/SOCIAL WORK - PATIENT PORTAL LINK FT
You can access the FollowMyHealth Patient Portal offered by [facility] by registering at the following website: [email or web address]. By joining [email or web address]’s FollowMyHealth portal, you will also be able to view your health information using other applications (apps) compatible with our system.

## 2025-01-09 NOTE — DISCHARGE NOTE PROVIDER - NPI NUMBER (FOR SYSADMIN USE ONLY) :
[[id number]],[[id number]],[[id number]],[[id number]] [[id number]],[[id number]]

## 2025-01-09 NOTE — DISCHARGE NOTE PROVIDER - NSDCFUADDAPPT_GEN_ALL_CORE_FT
-Please make sure you follow up with EP (electrophysiology) within 3-4 weeks from discharge from the hospital.  -Please make sure you follow up with EP (electrophysiology) within 3-4 weeks from discharge from the hospital.   -Please attend the remainder of your folloe up appointments  -Please make sure you follow up with EP (electrophysiology) within 3-4 weeks from discharge from the hospital.   -Please attend the remainder of your follow up appointments

## 2025-01-09 NOTE — DISCHARGE NOTE PROVIDER - NSDCFUSCHEDAPPT_GEN_ALL_CORE_FT
[facility]  NEUROLOGY [address]  Scheduled Appointment: 01/14/2025    Tang Gray  [facility]  CTSURG [address]  Scheduled Appointment: 01/15/2025    [facility]  HEARTVASC [address]  Scheduled Appointment: 01/30/2025

## 2025-01-09 NOTE — DISCHARGE NOTE PROVIDER - CARE PROVIDER_API CALL
Tang Gray  Thoracic and Cardiac Surgery  [address]  [place] [address]  Phone: [phone]  Fax: [phone]  Scheduled Appointment: 01/15/2025 12:15 PM    Arias Bobby  Interventional Cardiology  [address], [address]  [place] [address]  Phone: [phone]  Fax: [phone]  Follow Up Time:     Ranjana Quiñones NP in Family Health  [address] [address]  Phone: [phone]  Fax: [phone]  Scheduled Appointment: 01/14/2025 10:00 AM    Yoel Pressley  Cardiovascular Disease  [address] [place] [address] [address]  [place] [address]  Phone: [phone]  Fax: [phone]  Follow Up Time:    Tang Gray  Thoracic and Cardiac Surgery  [address]  [place] [address]  Phone: [phone]  Fax: [phone]  Scheduled Appointment: 01/15/2025 12:15 PM    Arias Bobby  Interventional Cardiology  [address], [address]  [place] [address]  Phone: [phone]  Fax: [phone]  Follow Up Time:     Ranjana Quiñones NP in Family Health  [address] [address]  Phone: [phone]  Fax: [phone]  Scheduled Appointment: 01/14/2025 10:00 AM    Anna Mora  Cardiovascular Disease  [address], [address] [place] [address]  Phone: [phone]  Fax: [phone]  Scheduled Appointment: 01/30/2025 11:00 AM

## 2025-01-10 ENCOUNTER — APPOINTMENT (OUTPATIENT)
Dept: CARE COORDINATION | Facility: HOME HEALTH | Age: 60
End: 2025-01-10
Payer: MEDICAID

## 2025-01-10 VITALS
OXYGEN SATURATION: 97 % | DIASTOLIC BLOOD PRESSURE: 76 MMHG | HEART RATE: 66 BPM | RESPIRATION RATE: 16 BRPM | SYSTOLIC BLOOD PRESSURE: 106 MMHG | WEIGHT: 101 LBS | BODY MASS INDEX: 20.4 KG/M2

## 2025-01-10 PROCEDURE — 99024 POSTOP FOLLOW-UP VISIT: CPT

## 2025-01-10 RX ORDER — ASPIRIN 81 MG
81 TABLET, DELAYED RELEASE (ENTERIC COATED) ORAL DAILY
Qty: 90 | Refills: 0 | Status: ACTIVE | COMMUNITY

## 2025-01-10 RX ORDER — METOPROLOL TARTRATE 37.5 MG/1
37.5 TABLET, FILM COATED ORAL
Qty: 60 | Refills: 1 | Status: ACTIVE | COMMUNITY

## 2025-01-13 DIAGNOSIS — D62 ACUTE POSTHEMORRHAGIC ANEMIA: ICD-10-CM

## 2025-01-13 DIAGNOSIS — I11.0 HYPERTENSIVE HEART DISEASE WITH HEART FAILURE: ICD-10-CM

## 2025-01-13 DIAGNOSIS — I35.1 NONRHEUMATIC AORTIC (VALVE) INSUFFICIENCY: ICD-10-CM

## 2025-01-13 DIAGNOSIS — I25.2 OLD MYOCARDIAL INFARCTION: ICD-10-CM

## 2025-01-13 DIAGNOSIS — G93.40 ENCEPHALOPATHY, UNSPECIFIED: ICD-10-CM

## 2025-01-13 DIAGNOSIS — E83.39 OTHER DISORDERS OF PHOSPHORUS METABOLISM: ICD-10-CM

## 2025-01-13 DIAGNOSIS — D69.6 THROMBOCYTOPENIA, UNSPECIFIED: ICD-10-CM

## 2025-01-13 DIAGNOSIS — G81.92 HEMIPLEGIA, UNSPECIFIED AFFECTING LEFT DOMINANT SIDE: ICD-10-CM

## 2025-01-13 DIAGNOSIS — R57.0 CARDIOGENIC SHOCK: ICD-10-CM

## 2025-01-13 DIAGNOSIS — E87.1 HYPO-OSMOLALITY AND HYPONATREMIA: ICD-10-CM

## 2025-01-13 DIAGNOSIS — E87.20 ACIDOSIS, UNSPECIFIED: ICD-10-CM

## 2025-01-13 DIAGNOSIS — J90 PLEURAL EFFUSION, NOT ELSEWHERE CLASSIFIED: ICD-10-CM

## 2025-01-13 DIAGNOSIS — I48.20 CHRONIC ATRIAL FIBRILLATION, UNSPECIFIED: ICD-10-CM

## 2025-01-13 DIAGNOSIS — I49.5 SICK SINUS SYNDROME: ICD-10-CM

## 2025-01-13 DIAGNOSIS — K21.9 GASTRO-ESOPHAGEAL REFLUX DISEASE WITHOUT ESOPHAGITIS: ICD-10-CM

## 2025-01-13 DIAGNOSIS — I50.32 CHRONIC DIASTOLIC (CONGESTIVE) HEART FAILURE: ICD-10-CM

## 2025-01-14 ENCOUNTER — APPOINTMENT (OUTPATIENT)
Dept: NEUROLOGY | Facility: CLINIC | Age: 60
End: 2025-01-14

## 2025-01-14 DIAGNOSIS — Z09 ENCOUNTER FOR FOLLOW-UP EXAMINATION AFTER COMPLETED TREATMENT FOR CONDITIONS OTHER THAN MALIGNANT NEOPLASM: ICD-10-CM

## 2025-01-14 RX ORDER — ALBUTEROL SULFATE 90 UG/1
108 (90 BASE) INHALANT RESPIRATORY (INHALATION)
Qty: 1 | Refills: 1 | Status: ACTIVE | COMMUNITY
Start: 2025-01-14 | End: 1900-01-01

## 2025-01-15 ENCOUNTER — APPOINTMENT (OUTPATIENT)
Dept: CARDIOTHORACIC SURGERY | Facility: CLINIC | Age: 60
End: 2025-01-15
Payer: MEDICAID

## 2025-01-15 ENCOUNTER — OUTPATIENT (OUTPATIENT)
Dept: OUTPATIENT SERVICES | Facility: HOSPITAL | Age: 60
LOS: 1 days | End: 2025-01-15
Payer: MEDICAID

## 2025-01-15 ENCOUNTER — NON-APPOINTMENT (OUTPATIENT)
Age: 60
End: 2025-01-15

## 2025-01-15 VITALS
TEMPERATURE: 97.3 F | HEIGHT: 59 IN | SYSTOLIC BLOOD PRESSURE: 87 MMHG | HEART RATE: 76 BPM | OXYGEN SATURATION: 98 % | DIASTOLIC BLOOD PRESSURE: 55 MMHG | WEIGHT: 96 LBS | BODY MASS INDEX: 19.35 KG/M2

## 2025-01-15 DIAGNOSIS — Z95.828 PRESENCE OF OTHER VASCULAR IMPLANTS AND GRAFTS: ICD-10-CM

## 2025-01-15 DIAGNOSIS — H02.826 CYSTS OF LEFT EYE, UNSPECIFIED EYELID: Chronic | ICD-10-CM

## 2025-01-15 PROCEDURE — 71046 X-RAY EXAM CHEST 2 VIEWS: CPT | Mod: 26

## 2025-01-15 PROCEDURE — 99024 POSTOP FOLLOW-UP VISIT: CPT

## 2025-01-15 PROCEDURE — 71046 X-RAY EXAM CHEST 2 VIEWS: CPT

## 2025-01-15 NOTE — CHART NOTE - NSCHARTNOTEFT_GEN_A_CORE
Admitting Diagnosis:   Patient is a 59y old  Female who presents with a chief complaint of AVR (08 Jan 2025 12:43)      PAST MEDICAL & SURGICAL HISTORY:  HTN (hypertension)  HLD (hyperlipidemia)  GERD (gastroesophageal reflux disease)  Anemia  MI (myocardial infarction)  Paroxysmal atrial fibrillation  Severe aortic regurgitation  Cyst of left eyelid  Cyst of the left eye removed in the past month, pt still currently taking opthalmic medications          Current Nutrition Order: DASH/TLC        PO Intake: Good (%) [ x ]  Fair (50-75%) [   ] Poor (<25%) [   ]    GI Issues: pt endorses some constipation while inpatient; +BM 1/7 per flowsheets     Pain: complaints of pain at PPM incision site per flowsheets     Skin Integrity:  surgical incision midline chest and L chest   no pressure injuries or edema documented   Titus score 20        01-07-25 @ 07:01  -  01-08-25 @ 07:00  --------------------------------------------------------  IN: 1150 mL / OUT: 0 mL / NET: 1150 mL        Labs:   01-08    135  |  99  |  8   ----------------------------<  107[H]  4.1   |  28  |  0.56    Ca    9.0      08 Jan 2025 05:30  Phos  3.7     01-08  Mg     1.8     01-08      CAPILLARY BLOOD GLUCOSE          Medications:  MEDICATIONS  (STANDING):  apixaban 2.5 milliGRAM(s) Oral every 12 hours  aspirin enteric coated 81 milliGRAM(s) Oral daily  atorvastatin 40 milliGRAM(s) Oral at bedtime  bisacodyl 5 milliGRAM(s) Oral at bedtime  chlorhexidine 2% Cloths 1 Application(s) Topical daily  glucagon  Injectable 1 milliGRAM(s) IntraMuscular once  latanoprost 0.005% Ophthalmic Solution 1 Drop(s) Both EYES at bedtime  lidocaine   4% Patch 1 Patch Transdermal every 24 hours  metoprolol tartrate 37.5 milliGRAM(s) Oral every 12 hours  pantoprazole    Tablet 40 milliGRAM(s) Oral before breakfast  polyethylene glycol 3350 17 Gram(s) Oral daily  sacubitril 49 mG/valsartan 51 mG 1 Tablet(s) Oral every 12 hours  senna 2 Tablet(s) Oral at bedtime  sodium chloride 0.9% lock flush 3 milliLiter(s) IV Push every 8 hours  sodium chloride 0.9%. 1000 milliLiter(s) (10 mL/Hr) IV Continuous <Continuous>    MEDICATIONS  (PRN):  acetaminophen     Tablet .. 650 milliGRAM(s) Oral every 6 hours PRN Moderate Pain (4 - 6)  melatonin 5 milliGRAM(s) Oral at bedtime PRN Sleep      Dosing Anthropometrics:   Height for BMI (FEET)	4 Feet  Height for BMI (INCHES)	11 Inch(s)  Height for BMI (CM)	149.86 Centimeter(s)  Weight for BMI (lbs)	99.2 lb  Weight for BMI (kg)	45 kg  Body Mass Index	              20  ideal body weight:               ~98 pounds, pt is ~101% of ideal body weight    Weight Change: no new weights noted in electronic medical records; recommend that nursing obtain updated weights weekly prn. RD to monitor trends.     Estimated energy needs:   Weight used for calculations	dosing weight  Estimated Energy Needs Weight (lbs)	99.2 lb  Estimated Energy Needs Weight (kg)	45 kg  Estimated Energy Needs From (violetta/kg)	25  Estimated Energy Needs To (violetta/kg)	30  Estimated Energy Needs Calculated From (violetta/kg)	1125  Estimated Energy Needs Calculated To (violetta/kg)	1350  Weight used for calculations	dosing weight  Estimated Protein Needs Weight (lbs)	99.2 lb  Estimated Protein Needs Weight (kg)	45 kg  Estimated Protein Needs From (g/kg)	1.4  Estimated Protein Needs To (g/kg)	1.6  Estimated Protein Needs Calculated From (g/kg)	63  Estimated Protein Needs Calculated To (g/kg)	72  Other Calculations	Estimated needs based on dosing wt as ~100% ideal body weight 98lb/44.5kg (101%). Needs adjusted for age, HF, post-op healing, and clinical status.   Defer fluids to team.    Subjective: This is a 58 year old female with a past medical history significant for HTN, HLD (not on statin secondary to elevated LFT's), anemia, NSTEMI (2011, no PCI), potential TIA (2011, no deficits), pAfib (on Eliquis), HFpEF (EF 55-60%), and gallstones who admitted for AVR, encompass [place], LAAO. Pt is status post procedure (12/23/24).    Pt seen for nutrition assessment. Received sitting upright in chair, able to articulate nutrition hx. Pt endorses fair appetite and intake, consumed oatmeal, pancake, banana, and milk for breakfast this AM. Pt with questions regarding diet recommendations upon discharge- RD provided handout on heart healthy diet guidelines. Pt also complaining of some constipation while inpatient; RD provided education on adequate fiber and fluid intake to aid in constipation. Pt verbalized appreciation and understanding. Labs and medications reviewed. Electrolytes WNL, glucose 107-125 x 24 hours. Ordered for dulcolax, miralax, senna, protonix. RD to remain available for additional nutrition interventions as needed.     Previous Nutrition Diagnosis: Increased nutrient needs related to physiological demands, postoperative status, as evidenced by status post AVR, replacement of ascending aorta     Active [ x ]  Resolved [   ]    Goal: Pt to consistently meet at least 75% of estimated energy expenditure via tolerated route that is consistent with goals of care during hospital stay    Recommendations:  1. Continue DASH/TLC diet   2. Encourage and monitor PO intake, honor preferences as able   >> Consistently meet >75% of estimated needs during admission   >> Consider oral nutrition supplement or liberalizing diet should intake decline </= 50%   3. Monitor wt trends, GI function, skin integrity  4. Monitor lytes, renal indices, blood glucose, LFTs    5. Pain and bowel regimen per team     Education: RD provided education on heart healthy diet guidelines and adequate fiber/fluid intake for constipation. Pt verbalized understanding.     Risk Level: High [   ] Moderate [ x ] Low [   ]
CT Removal:    Pt seen and examined at bedside.  Case discussed with CT surgeons who are recommending removal of mediastinal fabricio tubes. Minimal output from CT.  No air leak appreciated.  Two mediastinal chest tubes removed without incident.  Occlusive dressing placed. Follow up CXR with no obvious PTX noted.  Pt tolerated procedure well and remained hemodynamically stable.
Case reviewed and discussed with Dr. Wahl.    Although brain MRI will not , recommend repeat head CT before starting Eliquis for A-fib to assess stability of possible R thalamic stroke iso A-fib not on anticoagulation, likely etiology of lethargy and L-sided weakness at time of stroke code.    Further recommendations:  - please obtain repeat head CT prior to starting Eliquis  - if CT head stable, ok to start Eliquis for A-fib  - rest of care per primary team    Stroke team will continue to follow.
Exam:  US Chest    Procedure Date:    History: 59y Female whose CXR today shows a possible left __ sided pleural effusion.  Pt is POD #_7___ from AVR ascending replacement     Findings:                    Evaluation of the _left__ side of the thoracic cavity demonstrates                   small pleural effusion                      Impression:  small pleural effusion
Pt exhibiting intrinsic heart rate and rhythm.  Per Dr. Mares and Dr. William pacing wires removed at bedside.  No acute issues.  Pt tolerated the procedure well, remained HD stable. fabricio drain in place, output wnl
Admitting Diagnosis:   Patient is a 59y old  Female who presents with a chief complaint of AVR (02 Jan 2025 12:25)      PAST MEDICAL & SURGICAL HISTORY:  HTN (hypertension)  HLD (hyperlipidemia)  GERD (gastroesophageal reflux disease)  Anemia  MI (myocardial infarction)  Paroxysmal atrial fibrillation  Severe aortic regurgitation  Cyst of left eyelid  Cyst of the left eye removed in the past month, pt still currently taking opthalmic medications          Current Nutrition Order: DASH/TLC        PO Intake: Good (%) [   ]  Fair (50-75%) [ x ] Poor (<25%) [   ]    GI Issues: no report of nausea, vomiting, diarrhea, constipation; +BM 1/1 per flowsheets    Pain: noted with pain at incision site per flowsheets    Skin Integrity:  surgical incision midline chest  no pressure injuries or edema documented  Titus score 20        01-02-25 @ 07:01  -  01-03-25 @ 07:00  --------------------------------------------------------  IN: 820 mL / OUT: 450 mL / NET: 370 mL    01-03-25 @ 07:01  -  01-03-25 @ 13:13  --------------------------------------------------------  IN: 400 mL / OUT: 1200 mL / NET: -800 mL        Labs:   01-03    133[L]  |  99  |  12  ----------------------------<  98  4.2   |  26  |  0.57    Ca    8.9      03 Jan 2025 05:30  Phos  3.6     01-03  Mg     1.9     01-03    TPro  6.2  /  Alb  3.2[L]  /  TBili  0.3  /  DBili  x   /  AST  28  /  ALT  11  /  AlkPhos  131[H]  01-03    CAPILLARY BLOOD GLUCOSE          Medications:  MEDICATIONS  (STANDING):  apixaban 2.5 milliGRAM(s) Oral every 12 hours  aspirin enteric coated 81 milliGRAM(s) Oral daily  atorvastatin 40 milliGRAM(s) Oral at bedtime  bisacodyl 5 milliGRAM(s) Oral at bedtime  carvedilol 3.125 milliGRAM(s) Oral every 12 hours  chlorhexidine 2% Cloths 1 Application(s) Topical daily  glucagon  Injectable 1 milliGRAM(s) IntraMuscular once  latanoprost 0.005% Ophthalmic Solution 1 Drop(s) Both EYES at bedtime  lidocaine   4% Patch 1 Patch Transdermal every 24 hours  pantoprazole    Tablet 40 milliGRAM(s) Oral before breakfast  polyethylene glycol 3350 17 Gram(s) Oral daily  sacubitril 49 mG/valsartan 51 mG 1 Tablet(s) Oral every 12 hours  senna 2 Tablet(s) Oral at bedtime  sodium chloride 0.9% lock flush 3 milliLiter(s) IV Push every 8 hours  sodium chloride 0.9%. 1000 milliLiter(s) (10 mL/Hr) IV Continuous <Continuous>    MEDICATIONS  (PRN):  acetaminophen     Tablet .. 650 milliGRAM(s) Oral every 6 hours PRN Moderate Pain (4 - 6)  melatonin 5 milliGRAM(s) Oral at bedtime PRN Sleep  oxyCODONE    IR 5 milliGRAM(s) Oral every 6 hours PRN Severe Pain (7 - 10)      Dosing Anthropometrics:   Height for BMI (FEET)	4 Feet  Height for BMI (INCHES)	11 Inch(s)  Height for BMI (CM)	149.86 Centimeter(s)  Weight for BMI (lbs)	99.2 lb  Weight for BMI (kg)	45 kg  Body Mass Index	              20  ideal body weight:               ~98 pounds, pt is ~101% of ideal body weight    Weight Change: no new weights noted in electronic medical records; recommend that nursing obtain updated weights weekly prn. RD to monitor trends.     Estimated energy needs:   Weight used for calculations	dosing weight  Estimated Energy Needs Weight (lbs)	99.2 lb  Estimated Energy Needs Weight (kg)	45 kg  Estimated Energy Needs From (violetta/kg)	25  Estimated Energy Needs To (violetta/kg)	30  Estimated Energy Needs Calculated From (violetta/kg)	1125  Estimated Energy Needs Calculated To (violetta/kg)	1350  Weight used for calculations	dosing weight  Estimated Protein Needs Weight (lbs)	99.2 lb  Estimated Protein Needs Weight (kg)	45 kg  Estimated Protein Needs From (g/kg)	1.4  Estimated Protein Needs To (g/kg)	1.6  Estimated Protein Needs Calculated From (g/kg)	63  Estimated Protein Needs Calculated To (g/kg)	72  Other Calculations	Estimated needs based on dosing wt as ~100% ideal body weight 98lb/44.5kg (101%). Needs adjusted for age, HF, post-op healing, and clinical status.   Defer fluids to team.    Subjective: This is a 58 year old female with a past medical history significant for HTN, HLD (not on statin secondary to elevated LFT's), anemia, NSTEMI (2011, no PCI), potential TIA (2011, no deficits), pAfib (on Eliquis), HFpEF (EF 55-60%), and gallstones who admitted for AVR, encompass [place], LAAO. Pt is status post procedure (12/23/24).    Pt seen for follow up, however assessment limited as pt walking on unit at time of assessment. Pt continues on DASH/TLC diet. RN notes pt appetite has improved, consumed ~75% of breakfast this AM. No report of nausea, vomiting, diarrhea, constipation noted. Labs and medications reviewed. Na 133<L>, <H>, glucose  x 24 hours. Ordered for protonix, miralax, senna. RD to remain available for additional nutrition interventions as needed.     Previous Nutrition Diagnosis: Increased nutrient needs related to physiological demands, postoperative status, as evidenced by status post AVR, replacement of ascending aorta     Active [ x ]  Resolved [   ]    Goal: Pt to consistently meet at least 75% of estimated energy expenditure via tolerated route that is consistent with goals of care during hospital stay    Recommendations:  1. Continue DASH/TLC diet   2. Encourage and monitor PO intake, honor preferences as able   >> Consistently meet >75% of estimated needs during admission   >> Consider oral nutrition supplement or liberalizing diet should intake decline </= 50%   3. Monitor wt trends, GI function, skin integrity  4. Monitor lytes, renal indices, blood glucose, LFTs    5. Pain and bowel regimen per team     Education: deferred as pt walking on unit at time of assessment     Risk Level: High [ x ] Moderate [   ] Low [   ]
Admitting Diagnosis:   Patient is a 59y old  Female who presents with a chief complaint of AVR (29 Dec 2024 17:21)  PAST MEDICAL & SURGICAL HISTORY:  HTN (hypertension)  HLD (hyperlipidemia)  GERD (gastroesophageal reflux disease)  Anemia  MI (myocardial infarction)  Paroxysmal atrial fibrillation  Severe aortic regurgitation  Cyst of left eyelid  Cyst of the left eye removed in the past month, pt still currently taking opthalmic medications      Current Nutrition Order: DASH/TLC diet    PO Intake: Good (%) [   ]  Fair (50-75%) [   ] Poor (<25%) [   ] *fair overall PO ~55-75% PO intakes*    GI Issues: no GI upset noted; BM on 12/28/24; no noted abdominal distention per nursing/medical team    Pain: noted with partial pain relief (level 2 after management)    Skin Integrity: anterior medial chest surgical incisions; MSI incisions noted; noted with generalized, trace edema; no pressure ulcers noted; Titus: 20       12-28-24 @ 07:01  -  12-29-24 @ 07:00  --------------------------------------------------------  IN: 1028 mL / OUT: 2145 mL / NET: -1117 mL    12-29-24 @ 07:01  -  12-29-24 @ 22:34  --------------------------------------------------------  IN: 400 mL / OUT: 2675 mL / NET: -2275 mL        Labs:   12-29    132[L]  |  94[L]  |  24[H]  ----------------------------<  139[H]  4.0   |  27  |  0.75    Ca    8.8      29 Dec 2024 09:15  Phos  3.4     12-29  Mg     2.2     12-29    TPro  6.7  /  Alb  3.4  /  TBili  0.5  /  DBili  x   /  AST  31  /  ALT  10  /  AlkPhos  112  12-29    CAPILLARY BLOOD GLUCOSE      POCT Blood Glucose.: 110 mg/dL (29 Dec 2024 21:23)  POCT Blood Glucose.: 110 mg/dL (29 Dec 2024 16:51)  POCT Blood Glucose.: 92 mg/dL (29 Dec 2024 11:06)  POCT Blood Glucose.: 112 mg/dL (29 Dec 2024 07:55)      Medications:  MEDICATIONS  (STANDING):  aspirin enteric coated 81 milliGRAM(s) Oral daily  bisacodyl Suppository 10 milliGRAM(s) Rectal once  chlorhexidine 2% Cloths 1 Application(s) Topical daily  dextrose 5%. 1000 milliLiter(s) (100 mL/Hr) IV Continuous <Continuous>  dextrose 5%. 1000 milliLiter(s) (50 mL/Hr) IV Continuous <Continuous>  dextrose 50% Injectable 25 Gram(s) IV Push once  dextrose 50% Injectable 12.5 Gram(s) IV Push once  dextrose 50% Injectable 25 Gram(s) IV Push once  glucagon  Injectable 1 milliGRAM(s) IntraMuscular once  heparin   Injectable 2500 Unit(s) SubCutaneous every 12 hours  hydrALAZINE 10 milliGRAM(s) Oral three times a day  insulin lispro (ADMELOG) corrective regimen sliding scale   SubCutaneous Before meals and at bedtime  latanoprost 0.005% Ophthalmic Solution 1 Drop(s) Both EYES at bedtime  metoprolol tartrate 12.5 milliGRAM(s) Oral two times a day  pantoprazole    Tablet 40 milliGRAM(s) Oral before breakfast  polyethylene glycol 3350 17 Gram(s) Oral daily  senna 2 Tablet(s) Oral at bedtime  sodium chloride 0.9%. 1000 milliLiter(s) (10 mL/Hr) IV Continuous <Continuous>  sodium chloride 2% . 1000 milliLiter(s) (25 mL/Hr) IV Continuous <Continuous>  torsemide 20 milliGRAM(s) Oral daily    MEDICATIONS  (PRN):  dextrose Oral Gel 15 Gram(s) Oral once PRN Blood Glucose LESS THAN 70 milliGRAM(s)/deciliter  melatonin 5 milliGRAM(s) Oral at bedtime PRN Sleep    Dosing Anthropometrics:   Height for BMI (FEET)	4 Feet  Height for BMI (INCHES)	11 Inch(s)  Height for BMI (CM)	149.86 Centimeter(s)  Weight for BMI (lbs)	99.2 lb  Weight for BMI (kg)	45 kg  Body Mass Index	              20  ideal body weight:               ~98 pounds, pt is ~101% of ideal body weight    Weight Change: no new weights noted in electronic medical records; recommend that nursing obtain updated weights weekly prn. RD to monitor trends.     Estimated energy needs:   Weight used for calculations	dosing weight  Estimated Energy Needs Weight (lbs)	99.2 lb  Estimated Energy Needs Weight (kg)	45 kg  Estimated Energy Needs From (violetta/kg)	25  Estimated Energy Needs To (violetta/kg)	30  Estimated Energy Needs Calculated From (violetta/kg)	1125  Estimated Energy Needs Calculated To (violetta/kg)	1350  Weight used for calculations	dosing weight  Estimated Protein Needs Weight (lbs)	99.2 lb  Estimated Protein Needs Weight (kg)	45 kg  Estimated Protein Needs From (g/kg)	1.4  Estimated Protein Needs To (g/kg)	1.6  Estimated Protein Needs Calculated From (g/kg)	63  Estimated Protein Needs Calculated To (g/kg)	72  Other Calculations	Estimated needs based on dosing wt as ~100% ideal body weight 98lb/44.5kg (101%). Needs adjusted for age, HF, post-op healing, and clinical status.   Defer fluids to team.    Subjective: This is a 58 year old female with a past medical history significant for HTN, HLD (not on statin secondary to elevated LFT's), anemia, NSTEMI (2011, no PCI), potential TIA (2011, no deficits), pAfib (on Eliquis), HFpEF (EF 55-60%), and gallstones who admitted for AVR, encompass [place], LAAO. Pt is status post procedure (12/23/24).    RD visited pt on 9 east for nutrition follow up evaluation. Pt on 9 east, afebrile, MAPs ranged from , on nasal cannula with humidification, SpO2 %, no drips/gtts or pressor support noted. Pt was sitting pleasantly up in her chair; pt's family (daughter, ? son) were present in pt's room. Pt noted with no GI upset at time of evaluation. Pt noted with a fair appetite, per nursing, pt and pt's family, pt is eating well overall, fair PO intakes noted. No noted issues chewing/swallowing. Labs reviewed: low sodium (132), elevated BUN (24), lactate (2.6), medical team is aware, RD to monitor trends. RD reviewed pt's diet with her and her family, promoted heart healthy, nutritious choices; pt does not eat meat or eggs, eats fish, some dairy, eats vegetables, starches, fruits, etc. RD provided education on nutrient-dense foods that are vegetarian, promoted adequate protein postoperatively for healing. Pt and family appeared receptive, verbalized understanding. RD provided additional handouts from the nutrition care manual per pt and pt's family requests. RD to remain available. Please see additional nutrition recommendations below.     Previous Nutrition Diagnosis: Inadequate Oral Intake related to inability to meet nutrient needs via PO as evidenced by NPO    Active [   ]  Resolved [ x ]    If resolved, new PES: Increased nutrient needs related to physiological demands, postoperative status, as evidenced by status post AVR, replacement of ascending aorta     Goal: Pt to consistently meet at least 75% of estimated energy expenditure via tolerated route that is consistent with goals of care during hospital stay    Nutrition Recommendations:  1. Continue with current diet order (DASH/TLC diet)  2. Encourage pt to meet nutritional needs as able  3. Monitor PO intakes, trend weights (weekly), monitor skin integrity, monitor labs (electrolytes, CMP), monitor GI function  4. Encourage adherence to diet education (reinforce as able)   5. Continue insulin regimen prn to promote euglycemia   6. Pain and bowel regimen per team   7. Will continue to assess/honor preferences as able   8. Align nutrition interventions with goals of care at all times    Education: RD reviewed pt's diet with her and her family, promoted heart healthy, nutritious choices; pt does not eat meat or eggs, eats fish, some dairy, eats vegetables, starches, fruits, etc. RD provided education on nutrient-dense foods that are vegetarian, promoted adequate protein postoperatively for healing. Pt and family appeared receptive, verbalized understanding. RD provided additional handouts from the nutrition care manual per pt and pt's family requests.     Risk Level: High [   ] Moderate [ x ] Low [   ]
Chart reviewed, stable from Nephrology standpoint.   Nephro will s/off at this point.   Reconsult prn.
Post-Discharge Medication Review: Completed	  	  	  Patient's preferred pharmacy was updated in OMR: [facility] [place], [place] 	  	  Caregiver (Courtney, Daughter) contacted to offer medication counseling post-discharge. Medication reconciliation completed. Per Caregiver, medications include:	  	  1.	acetaminophen 325 mg oral tablet 2 tab(s) orally every 6 hours as needed for Moderate Pain (4 - 6)  2.	apixaban 2.5 mg oral tablet 1 tab(s) orally every 12 hours  3.	aspirin 81 mg oral delayed release tablet 1 tab(s) orally once a day  4.	atorvastatin 40 mg oral tablet 1 tab(s) orally once a day (at bedtime)  5.	Lasix 20 mg oral tablet 1 tab(s) orally once a day  6.	latanoprost 0.005% ophthalmic solution 1 drop(s) in each eye once a day  7.	melatonin 5 mg oral tablet 1 tab(s) orally once a day (at bedtime) As needed Sleep  8.	metoprolol tartrate 37.5 mg oral tablet 1 tab(s) orally every 12 hours  9.	pantoprazole 40 mg oral delayed release tablet 1 tab(s) orally once a day  10.	polyethylene glycol 3350 oral powder for reconstitution 17 gram(s) orally once a day as needed for constipation  11.	sacubitril-valsartan 49 mg-51 mg oral tablet 1 tab(s) orally every 12 hours   	   	  Medication name, indication, administration, side effects, and monitoring reviewed for new medications during post discharge counseling visit with Caregiver. Caregiver demonstrated understanding. Counseling offered for all medications.	  	  Caregiver states patient is doing well since discharge. Patient had a follow up appointment on 1/15/2025 with cardiologist. 	  	  Petty Antoine, Gerardo	  Clinical Pharmacy Specialist, Pharmacy Telehealth Team	  Can be reached via MS Teams or [phone]
Pt seen and examined at bedside.   Minimal output from CTs.  No air leak appreciated with valsalva. CXR stable without pleural effusion. Per Dr. Gray pigtail was removed and an occlusive dressing applied. Pt tolerated the procedure well and remained HD stable.  Follow up CXR showed no obvious PTX, awaiting official read.
Right U/S done showing moderate pleural effusion.  Asymptomatic and stable respiratory status, watching for now.  Discussed w/ Dr. Malave.
Ultrasound evaluation of b/l pleural effusions was performed at the bedside with Dr. Dominguez.  Bilateral pleural effusions note: Left pleural effusion is small; Right pleural effusion is moderate - large.  Will attempt to diuresis at this time given patient's oxygenation is improving (weaned off HFNC). Patient made aware that she may require drainage of right pleural effusion.

## 2025-01-15 NOTE — CHART NOTE - NSCHARTNOTESELECT_GEN_ALL_CORE
Nephrology
Nutrition Services
Nutrition Services
U/S right/Event Note
Ultrasounds/Event Note
chest tube removal/Event Note
Chest Tube Removal/Event Note
Event Note
Nutrition Services
Pacing wire removal/Event Note
US note/Event Note
Post-Discharge Note

## 2025-01-16 VITALS — DIASTOLIC BLOOD PRESSURE: 65 MMHG | SYSTOLIC BLOOD PRESSURE: 115 MMHG

## 2025-01-21 ENCOUNTER — NON-APPOINTMENT (OUTPATIENT)
Age: 60
End: 2025-01-21

## 2025-01-21 ENCOUNTER — TRANSCRIPTION ENCOUNTER (OUTPATIENT)
Age: 60
End: 2025-01-21

## 2025-01-21 DIAGNOSIS — L50.9 URTICARIA, UNSPECIFIED: ICD-10-CM

## 2025-01-21 RX ORDER — DIPHENHYDRAMINE HCL 25 MG/1
25 TABLET ORAL
Qty: 30 | Refills: 0 | Status: ACTIVE | COMMUNITY
Start: 2025-01-21 | End: 1900-01-01

## 2025-01-30 ENCOUNTER — APPOINTMENT (OUTPATIENT)
Dept: HEART AND VASCULAR | Facility: CLINIC | Age: 60
End: 2025-01-30

## 2025-02-13 ENCOUNTER — APPOINTMENT (OUTPATIENT)
Dept: HEART AND VASCULAR | Facility: CLINIC | Age: 60
End: 2025-02-13
Payer: MEDICAID

## 2025-02-13 ENCOUNTER — NON-APPOINTMENT (OUTPATIENT)
Age: 60
End: 2025-02-13

## 2025-02-13 VITALS
BODY MASS INDEX: 19.35 KG/M2 | WEIGHT: 96 LBS | HEIGHT: 59 IN | SYSTOLIC BLOOD PRESSURE: 133 MMHG | OXYGEN SATURATION: 99 % | HEART RATE: 64 BPM | DIASTOLIC BLOOD PRESSURE: 79 MMHG

## 2025-02-13 DIAGNOSIS — I48.91 UNSPECIFIED ATRIAL FIBRILLATION: ICD-10-CM

## 2025-02-13 PROCEDURE — 93280 PM DEVICE PROGR EVAL DUAL: CPT

## 2025-05-02 ENCOUNTER — APPOINTMENT (OUTPATIENT)
Dept: HEART AND VASCULAR | Facility: CLINIC | Age: 60
End: 2025-05-02

## 2025-05-15 ENCOUNTER — APPOINTMENT (OUTPATIENT)
Dept: HEART AND VASCULAR | Facility: CLINIC | Age: 60
End: 2025-05-15
Payer: MEDICAID

## 2025-05-15 VITALS
SYSTOLIC BLOOD PRESSURE: 140 MMHG | HEART RATE: 60 BPM | DIASTOLIC BLOOD PRESSURE: 72 MMHG | WEIGHT: 98 LBS | BODY MASS INDEX: 19.76 KG/M2 | TEMPERATURE: 97.3 F | HEIGHT: 59 IN

## 2025-05-15 DIAGNOSIS — Z95.0 PRESENCE OF CARDIAC PACEMAKER: ICD-10-CM

## 2025-05-15 DIAGNOSIS — I49.5 PAROXYSMAL ATRIAL FIBRILLATION: ICD-10-CM

## 2025-05-15 DIAGNOSIS — I48.91 UNSPECIFIED ATRIAL FIBRILLATION: ICD-10-CM

## 2025-05-15 DIAGNOSIS — I48.0 PAROXYSMAL ATRIAL FIBRILLATION: ICD-10-CM

## 2025-05-15 PROCEDURE — 93288 INTERROG EVL PM/LDLS PM IP: CPT

## 2025-05-15 PROCEDURE — 99214 OFFICE O/P EST MOD 30 MIN: CPT | Mod: 25

## 2025-09-12 ENCOUNTER — NON-APPOINTMENT (OUTPATIENT)
Age: 60
End: 2025-09-12

## (undated) DEVICE — DRSG TAPE UMBILICAL COTTON 2" X 30 X 1/8"

## (undated) DEVICE — SUT SILK 5-0 60" TIES

## (undated) DEVICE — POSITIONER FOAM EGG CRATE ULNAR 2PCS (PINK)

## (undated) DEVICE — BLADE SCALPEL SAFETY #10 WITH PLASTIC GREEN HANDLE

## (undated) DEVICE — VESSEL LOOP MAXI-BLUE 0.120" X 16"

## (undated) DEVICE — DRAIN RESERVOIR FOR JACKSON PRATT 100CC CARDINAL

## (undated) DEVICE — SOL ANTI FOG (FRED)

## (undated) DEVICE — SUT PROLENE 4-0 36" RB-1

## (undated) DEVICE — TUBING STRYKER PNEUMOCLEAR HIGH FLOW

## (undated) DEVICE — SUCTION YANKAUER BULBOUS TIP NO VENT

## (undated) DEVICE — GOWN LG

## (undated) DEVICE — SUT ETHIBOND 3-0 36" RB-1

## (undated) DEVICE — SUT DOUBLE 6 WIRE STERNAL

## (undated) DEVICE — TOURNIQUET SET 12FR (1 RED, 1 BLUE, 3 CLEAR, 1 SNARE) 7"

## (undated) DEVICE — DRSG DERMABOND PRINEO 60CM

## (undated) DEVICE — MARKING PEN W RULER

## (undated) DEVICE — SUT MONOCRYL 4-0 27" PS-2 UNDYED

## (undated) DEVICE — CATH CV TRAY INSR ST UNIV

## (undated) DEVICE — SUT PLEDGET SOFT LARGE 3/8" X 3/16" X 1/16" X6

## (undated) DEVICE — SUT STAINLESS STEEL 7 4-18" CCS

## (undated) DEVICE — CATH NG SALEM SUMP 16FR

## (undated) DEVICE — PACK OPEN HEART LNX

## (undated) DEVICE — DRSG DERMABOND 0.7ML

## (undated) DEVICE — PUNCH VASC STD 7.75" HANDLE 4.8MM DISP

## (undated) DEVICE — CATH TRIOX OXIMETRY 8F 3 LUMENS

## (undated) DEVICE — DRAPE PROBE COVER 5" X 96"

## (undated) DEVICE — BLADE SCALPEL SAFETY #11 WITH PLASTIC GREEN HANDLE

## (undated) DEVICE — DRSG BIOPATCH DISK W CHG 1" W 4.0MM HOLE

## (undated) DEVICE — PAD NERVE PHRENIC NERVE

## (undated) DEVICE — CARDIOPLEGIA MANAGEMENT SET COLOR CODED CLAMPS

## (undated) DEVICE — DRSG MEPILEX 10 X 10CM (4 X 4") AG

## (undated) DEVICE — DEVICE CLAMP ENCOMPASS SYNERGY ISOLATOR

## (undated) DEVICE — GLV 7 PROTEXIS (WHITE)

## (undated) DEVICE — SUT PROLENE 6-0 30" BV-1

## (undated) DEVICE — GLV 8.5 PROTEXIS (WHITE)

## (undated) DEVICE — PACK PROCEDURE HARVEST SMARTPREP APC-60

## (undated) DEVICE — SUT PROLENE 3-0 36" RB-1 HS-5

## (undated) DEVICE — SUT PROLENE 3-0 36" SH

## (undated) DEVICE — Device

## (undated) DEVICE — SUT PDS II 2-0 27" CT-1

## (undated) DEVICE — SUT SILK 2-0 18" SH (POP-OFF)

## (undated) DEVICE — DRSG TEGADERM 4 X 4.75"

## (undated) DEVICE — DRSG ACE BANDAGE 6" LF STERILE

## (undated) DEVICE — SUT PROLENE 3-0 36" SH-1

## (undated) DEVICE — TONGUE DEPRESSOR

## (undated) DEVICE — CLIPPER BLADE GENERAL USE

## (undated) DEVICE — SUCTION CATH ARGYLE WHISTLE TIP 14FR STRAIGHT PACKED

## (undated) DEVICE — AROS VESSEL CLAMP SINGLE LARGE (YELLOW) 120G

## (undated) DEVICE — SUT VICRYL 2-0 27" CT-1

## (undated) DEVICE — INS ST DBL SAFEJAW FGRTY

## (undated) DEVICE — TUBING SUCTION NONCONDUCTIVE 6MM X 12FT

## (undated) DEVICE — SUT PROLENE 6-0 30" RB-2

## (undated) DEVICE — CONNECTOR STRAIGHT 3/8 X 1/2"

## (undated) DEVICE — GUIDE SELECTION FOR STUDY ONLY G180173 F/ATRICLIP LAA SYS

## (undated) DEVICE — RIGID ADULT SUCKER

## (undated) DEVICE — VENTING ADAPTER "Y" (RED/BLUE) 7.5"

## (undated) DEVICE — ELCTR STRYKER NEPTUNE SMOKE EVACUATION PENCIL (GREEN)

## (undated) DEVICE — DRAPE SLUSH / WARMER 44 X 66"

## (undated) DEVICE — BLOWER MISTER AXIUS WITH IV SET

## (undated) DEVICE — DRAPE IOBAN 33" X 23"

## (undated) DEVICE — SUT PROLENE 4-0 36" SH

## (undated) DEVICE — SUT NUROLON 1 18" OS-8 (POP-OFF)

## (undated) DEVICE — SUT PDS PLUS 0 27" CT-1

## (undated) DEVICE — PUNCH VASC STD 7.75" HANDLE 4MM DISP

## (undated) DEVICE — SUT PROLENE 5-0 30" RB-2

## (undated) DEVICE — SUT PLEDGET SOFT MEDIUM 1/4" X 1/8" X 1/16" X6

## (undated) DEVICE — DRAPE TOWEL BLUE 17" X 24"

## (undated) DEVICE — NDL COUNTER FOAM AND MAGNET 40-70

## (undated) DEVICE — DRSG ACE BANDAGE 6"

## (undated) DEVICE — SUCTION CATH AIRLIFE CONTROL VALVE TRIFLO 14FR

## (undated) DEVICE — SUMP INTRACARDIAC/PERICARDIAL 20FR 1/4" ADULT

## (undated) DEVICE — SUT PROLENE 5-0 36" RB-1

## (undated) DEVICE — TUBING SMOKE EVAC 3/8" X 10FT FOR NEPTUNE

## (undated) DEVICE — DRSG TRACH DRAINAGE 4X4

## (undated) DEVICE — PACK PROC CV DRAPE

## (undated) DEVICE — PREP SCRUB BRUSH W CHG 4%

## (undated) DEVICE — FOLEY TRAY 16FR 5CC LF LUBRISIL ADVANCE TEMP CLOSED

## (undated) DEVICE — TEMP PROBE 30MM MYOCARDIAL